# Patient Record
Sex: MALE | Race: OTHER | Employment: OTHER | URBAN - METROPOLITAN AREA
[De-identification: names, ages, dates, MRNs, and addresses within clinical notes are randomized per-mention and may not be internally consistent; named-entity substitution may affect disease eponyms.]

---

## 2017-01-12 RX ORDER — PRAVASTATIN SODIUM 20 MG/1
TABLET ORAL
Qty: 30 TAB | Refills: 2 | Status: SHIPPED | OUTPATIENT
Start: 2017-01-12 | End: 2017-10-02 | Stop reason: SDUPTHER

## 2017-01-17 ENCOUNTER — OFFICE VISIT (OUTPATIENT)
Dept: INTERNAL MEDICINE CLINIC | Age: 82
End: 2017-01-17

## 2017-01-17 VITALS
BODY MASS INDEX: 26.1 KG/M2 | DIASTOLIC BLOOD PRESSURE: 78 MMHG | RESPIRATION RATE: 16 BRPM | HEIGHT: 69 IN | OXYGEN SATURATION: 98 % | HEART RATE: 69 BPM | WEIGHT: 176.2 LBS | TEMPERATURE: 98.4 F | SYSTOLIC BLOOD PRESSURE: 126 MMHG

## 2017-01-17 DIAGNOSIS — R73.03 PREDIABETES: Primary | ICD-10-CM

## 2017-01-17 DIAGNOSIS — E78.2 MIXED HYPERLIPIDEMIA: ICD-10-CM

## 2017-01-17 DIAGNOSIS — R42 DIZZINESS AND GIDDINESS: ICD-10-CM

## 2017-01-17 RX ORDER — METFORMIN HYDROCHLORIDE 500 MG/1
500 TABLET ORAL
Qty: 30 TAB | Refills: 0 | Status: SHIPPED | OUTPATIENT
Start: 2017-01-17 | End: 2017-02-06 | Stop reason: SDUPTHER

## 2017-01-17 NOTE — PROGRESS NOTES
Chief Complaint   Patient presents with    High Blood Sugar     checked yesterday it was 174 at breakfast 145 states was advised to take tablets. he quit taking and now feels it is time to go back to tablets. states that eyes are burning and itching. states has some dizzines that goes away without eating.

## 2017-01-17 NOTE — PROGRESS NOTES
HISTORY OF PRESENT ILLNESS  Manuel Joshi is a 80 y.o. male. HPI  The patient comes in today C/O high blood sugar. His fasting BS was 145 this morning and he is not taking medication for this. He was not feeling very well the past week and his BS was high. He has been feeling very tired and weak the past 3 days. He has been dizzy as well. He has had increased thirst.  His A1c in 01/2016 was 6.6% , he wanted to control it on diet and exercise but he did admit today that he was not compliant with diet all the time. His BP is also high today at 150/88 and it was rechecked at 126/78. He checks his BP sometime at home and it is normal. He is taking Pravachol for high cholesterol and Neurontin for his leg pain. He also takes a baby aspirin a day. He has followed with ophthalmologist at [facility]. He had a Pterigyum and the surgery was done by Dr. Jose F Mo. Patient Active Problem List   Diagnosis Code    Coronary atherosclerosis of native coronary artery I25.10    Hyperlipidemia E78.5    Snoring R06.83    Fatigue R53.83    Headache(784.0)         Current Outpatient Prescriptions on File Prior to Visit   Medication Sig Dispense Refill    pravastatin (PRAVACHOL) 20 mg tablet TAKE 1 TABLET BY MOUTH AT BEDTIME 30 Tab 2    LOTEMAX 0.5 % oint   0    gabapentin (NEURONTIN) 300 mg capsule       VITAMIN D3 2,000 unit cap       aspirin delayed-release (ASPIR-LOW) 81 mg tablet Take 81 mg by mouth daily. No current facility-administered medications on file prior to visit. No Known Allergies    Past Medical History   Diagnosis Date    BPH (benign prostatic hyperplasia)     Hyperlipemia     Hypertension     RA (rheumatoid arthritis) ([facility] [address]        History reviewed. No pertinent past surgical history.     Family History   Problem Relation Age of Onset    Family history unknown: Yes       Social History     Social History    Marital status: UNKNOWN     Spouse name: N/A    Number of children: N/A    Years of education: N/A     Occupational History    Not on file. Social History Main Topics    Smoking status: Former Smoker     Packs/day: 1.00     Years: 15.00     Types: Cigarettes     Quit date: 3/19/1975    Smokeless tobacco: Never Used    Alcohol use No    Drug use: No    Sexual activity: Yes     Partners: Female     Other Topics Concern    Not on file     Social History Narrative           Review of Systems   Constitutional: Positive for malaise/fatigue. HENT: Negative for congestion. Respiratory: Negative for cough and wheezing. Cardiovascular: Negative for chest pain and palpitations. Neurological: Positive for dizziness and weakness. Negative for tingling, sensory change and headaches. Visit Vitals    /78 (BP 1 Location: Right arm, BP Patient Position: Sitting)    Pulse 69    Temp 98.4 °F (36.9 °C) (Oral)    Resp 16    Ht 5' 9\" (1.753 m)    Wt 176 lb 3.2 oz (79.9 kg)    SpO2 98%    BMI 26.02 kg/m2     Physical Exam   Constitutional: He is oriented to person, place, and time. He appears well-nourished. No distress. HENT:   Right Ear: External ear normal.   Left Ear: External ear normal.   Mouth/Throat: Oropharynx is clear and moist.   Eyes: Conjunctivae and EOM are normal.   Neck: Normal range of motion. Neck supple. Cardiovascular: Normal rate and regular rhythm. Pulmonary/Chest: Effort normal and breath sounds normal. He has no wheezes. Abdominal: Soft. Bowel sounds are normal.   Neurological: He is alert and oriented to person, place, and time. Psychiatric: He has a normal mood and affect. Nursing note and vitals reviewed. ASSESSMENT and PLAN    ICD-10-CM ICD-9-CM    1. Prediabetes R73.03 790.29 HEMOGLOBIN A1C WITH EAG      METABOLIC PANEL, COMPREHENSIVE      metFORMIN (GLUCOPHAGE) 500 mg tablet sent to pharmacy. I will send his metformin to the pharmacy to help keep his BS under control.  I want him to take this medication once a day until I get the blood work results back. 2. Mixed hyperlipidemia E78.2 272.2 LIPID PANEL    Pt will return during lab hours to have basic fasting labs drawn. 3. Dizziness and giddiness R42 780.4 We will check his levels to determine if his diabetes has not gotten worse. This plan was reviewed with the patient and patient agrees. All questions were answered. This scribe documentation was reviewed by me and accurately reflects the examination and decisions made by me. This note will not be viewable in [address]

## 2017-01-17 NOTE — MR AVS SNAPSHOT
Visit Information Date & Time Provider Department Dept. Phone Encounter #  
 1/17/2017 12:00 PM Melisa Nagel, [address] [facility],[address] Internal Medicine [phone] [id number] Upcoming Health Maintenance Date Due DTaP/Tdap/Td series (1 - Tdap) 6/24/1955 ZOSTER VACCINE AGE 60> 6/24/1994 GLAUCOMA SCREENING Q2Y 6/24/1999 Pneumococcal 65+ Low/Medium Risk (1 of 2 - PCV13) 6/24/1999 MEDICARE YEARLY EXAM 6/24/1999 INFLUENZA AGE 9 TO ADULT 8/1/2016 COLONOSCOPY 11/4/2019 Allergies as of 1/17/2017  Review Complete On: 1/17/2017 By: Melisa Nagel MD  
 No Known Allergies Current Immunizations  Never Reviewed No immunizations on file. Not reviewed this visit You Were Diagnosed With   
  
 Codes Comments Prediabetes    -  Primary ICD-10-CM: R73.03 
ICD-9-CM: 790.29 Mixed hyperlipidemia     ICD-10-CM: E78.2 ICD-9-CM: 272.2 Dizziness and giddiness     ICD-10-CM: E29 ICD-9-CM: 780.4 Vitals BP Pulse Temp Resp Height(growth percentile) Weight(growth percentile) 126/78 (BP 1 Location: Right arm, BP Patient Position: Sitting) 69 98.4 °F (36.9 °C) (Oral) 16 5' 9\" (1.753 m) 176 lb 3.2 oz (79.9 kg) SpO2 BMI Smoking Status 98% 26.02 kg/m2 Former Smoker Vitals History BMI and BSA Data Body Mass Index Body Surface Area 26.02 kg/m 2 1.97 m 2 Preferred Pharmacy Pharmacy Name Phone [id number] [place] [phone] Your Updated Medication List  
  
   
This list is accurate as of: 1/17/17 12:34 PM.  Always use your most recent med list.  
  
  
  
  
 ASPIR-LOW 81 mg tablet Generic drug:  aspirin delayed-release Take 81 mg by mouth daily. gabapentin 300 mg capsule Commonly known as:  NEURONTIN  
  
 LOTEMAX 0.5 % Oint Generic drug:  loteprednol etabonate  
  
 metFORMIN 500 mg tablet Commonly known as:  GLUCOPHAGE  
 Take 1 Tab by mouth daily (with breakfast) for 30 days. pravastatin 20 mg tablet Commonly known as:  PRAVACHOL  
TAKE 1 TABLET BY MOUTH AT BEDTIME  
  
 VITAMIN D3 2,000 unit Cap capsule Generic drug:  Cholecalciferol (Vitamin D3) Prescriptions Sent to Pharmacy Refills  
 metFORMIN (GLUCOPHAGE) 500 mg tablet 0 Sig: Take 1 Tab by mouth daily (with breakfast) for 30 days. Class: Normal  
 Pharmacy: [facility] #: [phone] Route: Oral  
  
To-Do List   
 01/18/2017 Lab:  HEMOGLOBIN A1C WITH EAG   
  
 01/18/2017 Lab:  LIPID PANEL   
  
 01/18/2017 Lab:  METABOLIC PANEL, COMPREHENSIVE Introducing [facility] & HEALTH SERVICES! Fidel Magana introduces [employer] patient portal. Now you can access parts of your medical record, email your doctor's office, and request medication refills online. 1. In your internet browser, go to [email or web address] 2. Click on the First Time User? Click Here link in the Sign In box. You will see the New Member Sign Up page. 3. Enter your [employer] Access Code exactly as it appears below. You will not need to use this code after youve completed the sign-up process. If you do not sign up before the expiration date, you must request a new code. · [employer] Access Code: [email or web address] Expires: 4/17/2017 12:34 PM 
 
4. Enter the last four digits of your Social Security Number (xxxx) and Date of Birth (mm/dd/yyyy) as indicated and click Submit. You will be taken to the next sign-up page. 5. Create a Modernizing [employer] ID. This will be your [employer] login ID and cannot be changed, so think of one that is secure and easy to remember. 6. Create a [employer] password. You can change your password at any time. 7. Enter your Password Reset Question and Answer. This can be used at a later time if you forget your password. 8. Enter your e-mail address. You will receive e-mail notification when new information is available in [address] 9. Click Sign Up. You can now view and download portions of your medical record. 10. Click the Download Summary menu link to download a portable copy of your medical information. If you have questions, please visit the Frequently Asked Questions section of the [employer] website. Remember, [employer] is NOT to be used for urgent needs. For medical emergencies, dial 911. Now available from your iPhone and Android! Please provide this summary of care documentation to your next provider. Your primary care clinician is listed as Nora Khoury. If you have any questions after today's visit, please call [phone].

## 2017-01-18 DIAGNOSIS — E78.2 MIXED HYPERLIPIDEMIA: ICD-10-CM

## 2017-01-18 DIAGNOSIS — R73.03 PREDIABETES: ICD-10-CM

## 2017-01-18 NOTE — LETTER
1/30/2017 9:36 AM 
 
Mr. Citlali Morales 
[address] [address] Dear Citlali Morales: 
 
Please find your most recent results below. Resulted Orders HEMOGLOBIN A1C WITH EAG Result Value Ref Range Hemoglobin A1c 7.1 (H) 4.8 - 5.6 % Comment:  
            Pre-diabetes: 5.7 - 6.4 Diabetes: >6.4 Glycemic control for adults with diabetes: <7.0 Estimated average glucose 157 mg/dL Narrative Performed at:  [address]  [id number] : Geoffrey Martini MD, Phone:  [phone] LIPID PANEL Result Value Ref Range Cholesterol, total 167 100 - 199 mg/dL Triglyceride 191 (H) 0 - 149 mg/dL HDL Cholesterol 40 >39 mg/dL VLDL, calculated 38 5 - 40 mg/dL LDL, calculated 89 0 - 99 mg/dL Narrative Performed at:  [address]  [id number] : Geoffrey Martini MD, Phone:  [phone] METABOLIC PANEL, COMPREHENSIVE Result Value Ref Range Glucose 166 (H) 65 - 99 mg/dL BUN 20 8 - 27 mg/dL Creatinine 1.18 0.76 - 1.27 mg/dL GFR est non-AA 57 (L) >59 mL/min/1.73 GFR est AA 66 >59 mL/min/1.73  
 BUN/Creatinine ratio 17 10 - 22 Sodium 138 134 - 144 mmol/L Potassium 4.3 3.5 - 5.2 mmol/L Chloride 100 96 - 106 mmol/L  
 CO2 21 18 - 29 mmol/L Calcium 9.3 8.6 - 10.2 mg/dL Protein, total 7.6 6.0 - 8.5 g/dL Albumin 4.6 3.5 - 4.7 g/dL GLOBULIN, TOTAL 3.0 1.5 - 4.5 g/dL A-G Ratio 1.5 1.1 - 2.5 Bilirubin, total 0.6 0.0 - 1.2 mg/dL Alk. phosphatase 84 39 - 117 IU/L  
 AST 29 0 - 40 IU/L  
 ALT 33 0 - 44 IU/L Narrative Performed at:  [address]  [id number] : Geoffrey Martini MD, Phone:  [phone] CVD REPORT Result Value Ref Range INTERPRETATION NTAP Narrative Performed at:  [address] [place] [address]  [id number] : Lucero Roman PhD, Phone:  [phone] CKD REPORT Result Value Ref Range Interpretation Note Comment:  
   Supplement report is available. Narrative Performed at:  [address] [place] [address]  [id number] : Lucero Roman PhD, Phone:  [phone] RECOMMENDATIONS: 
Your HbA1C(average of last 3 mths blood sugar) has gone up. Should continue Metformin & diabetic diet. Repeat Labs in 3 months. Please call me if you have any questions: [phone] Sincerely, Juan C Mancera MD

## 2017-01-19 LAB
ALBUMIN SERPL-MCNC: 4.6 G/DL (ref 3.5–4.7)
ALBUMIN/GLOB SERPL: 1.5 {RATIO} (ref 1.1–2.5)
ALP SERPL-CCNC: 84 IU/L (ref 39–117)
ALT SERPL-CCNC: 33 IU/L (ref 0–44)
AST SERPL-CCNC: 29 IU/L (ref 0–40)
BILIRUB SERPL-MCNC: 0.6 MG/DL (ref 0–1.2)
BUN SERPL-MCNC: 20 MG/DL (ref 8–27)
BUN/CREAT SERPL: 17 (ref 10–22)
CALCIUM SERPL-MCNC: 9.3 MG/DL (ref 8.6–10.2)
CHLORIDE SERPL-SCNC: 100 MMOL/L (ref 96–106)
CHOLEST SERPL-MCNC: 167 MG/DL (ref 100–199)
CO2 SERPL-SCNC: 21 MMOL/L (ref 18–29)
CREAT SERPL-MCNC: 1.18 MG/DL (ref 0.76–1.27)
EST. AVERAGE GLUCOSE BLD GHB EST-MCNC: 157 MG/DL
GLOBULIN SER CALC-MCNC: 3 G/DL (ref 1.5–4.5)
GLUCOSE SERPL-MCNC: 166 MG/DL (ref 65–99)
HBA1C MFR BLD: 7.1 % (ref 4.8–5.6)
HDLC SERPL-MCNC: 40 MG/DL
INTERPRETATION, 910389: NORMAL
INTERPRETATION: NORMAL
LDLC SERPL CALC-MCNC: 89 MG/DL (ref 0–99)
POTASSIUM SERPL-SCNC: 4.3 MMOL/L (ref 3.5–5.2)
PROT SERPL-MCNC: 7.6 G/DL (ref 6–8.5)
SODIUM SERPL-SCNC: 138 MMOL/L (ref 134–144)
TRIGL SERPL-MCNC: 191 MG/DL (ref 0–149)
VLDLC SERPL CALC-MCNC: 38 MG/DL (ref 5–40)

## 2017-02-06 DIAGNOSIS — R73.03 PREDIABETES: ICD-10-CM

## 2017-02-06 RX ORDER — METFORMIN HYDROCHLORIDE 500 MG/1
500 TABLET ORAL
Qty: 30 TAB | Refills: 0 | Status: SHIPPED | OUTPATIENT
Start: 2017-02-06 | End: 2017-02-26 | Stop reason: SDUPTHER

## 2017-02-26 DIAGNOSIS — R73.03 PREDIABETES: ICD-10-CM

## 2017-02-27 RX ORDER — METFORMIN HYDROCHLORIDE 500 MG/1
TABLET ORAL
Qty: 30 TAB | Refills: 0 | Status: SHIPPED | OUTPATIENT
Start: 2017-02-27 | End: 2017-04-29 | Stop reason: SDUPTHER

## 2017-04-29 DIAGNOSIS — R73.03 PREDIABETES: ICD-10-CM

## 2017-05-01 RX ORDER — METFORMIN HYDROCHLORIDE 500 MG/1
TABLET ORAL
Qty: 30 TAB | Refills: 0 | Status: SHIPPED | OUTPATIENT
Start: 2017-05-01 | End: 2017-07-02 | Stop reason: SDUPTHER

## 2017-05-22 ENCOUNTER — OFFICE VISIT (OUTPATIENT)
Dept: INTERNAL MEDICINE CLINIC | Age: 82
End: 2017-05-22

## 2017-05-22 VITALS
RESPIRATION RATE: 16 BRPM | SYSTOLIC BLOOD PRESSURE: 138 MMHG | HEART RATE: 62 BPM | DIASTOLIC BLOOD PRESSURE: 82 MMHG | BODY MASS INDEX: 25.3 KG/M2 | TEMPERATURE: 98.4 F | WEIGHT: 170.8 LBS | HEIGHT: 69 IN | OXYGEN SATURATION: 98 %

## 2017-05-22 DIAGNOSIS — M54.9 ACUTE UPPER BACK PAIN: ICD-10-CM

## 2017-05-22 DIAGNOSIS — R05.3 COUGH, PERSISTENT: Primary | ICD-10-CM

## 2017-05-22 RX ORDER — DIFLUNISAL 500 MG/1
500 TABLET, FILM COATED ORAL 2 TIMES DAILY
Qty: 30 TAB | Refills: 0 | Status: SHIPPED | OUTPATIENT
Start: 2017-05-22 | End: 2017-07-05 | Stop reason: SDUPTHER

## 2017-05-22 RX ORDER — BENZONATATE 200 MG/1
200 CAPSULE ORAL
Qty: 21 CAP | Refills: 0 | Status: SHIPPED | OUTPATIENT
Start: 2017-05-22 | End: 2018-03-15 | Stop reason: SDUPTHER

## 2017-05-22 NOTE — PROGRESS NOTES
Chief Complaint   Patient presents with    Cough     states that he has had a cough for a month and now when he coughs his back has pain. mid back in center.

## 2017-05-22 NOTE — MR AVS SNAPSHOT
Visit Information Date & Time Provider Department Dept. Phone Encounter #  
 5/22/2017 12:45 PM Claudine Perkins MD [facility] Internal Medicine [phone] [id number] Upcoming Health Maintenance Date Due DTaP/Tdap/Td series (1 - Tdap) 6/24/1955 ZOSTER VACCINE AGE 60> 6/24/1994 GLAUCOMA SCREENING Q2Y 6/24/1999 Pneumococcal 65+ Low/Medium Risk (1 of 2 - PCV13) 6/24/1999 MEDICARE YEARLY EXAM 6/24/1999 INFLUENZA AGE 9 TO ADULT 8/1/2017 COLONOSCOPY 11/4/2019 Allergies as of 5/22/2017  Review Complete On: 5/22/2017 By: Claudine Perkins MD  
 No Known Allergies Current Immunizations  Never Reviewed No immunizations on file. Not reviewed this visit You Were Diagnosed With   
  
 Codes Comments Cough, persistent    -  Primary ICD-10-CM: R05 ICD-9-CM: 786.2 Acute upper back pain     ICD-10-CM: M54.9 ICD-9-CM: 724.5, 338.19 Vitals BP Pulse Temp Resp Height(growth percentile) Weight(growth percentile) 138/82 (BP 1 Location: Right arm, BP Patient Position: Sitting) 62 98.4 °F (36.9 °C) (Oral) 16 5' 9\" (1.753 m) 170 lb 12.8 oz (77.5 kg) SpO2 BMI Smoking Status 98% 25.22 kg/m2 Former Smoker BMI and BSA Data Body Mass Index Body Surface Area  
 25.22 kg/m 2 1.94 m 2 Preferred Pharmacy Pharmacy Name Phone [id number], [address] [place] [address] [phone] Your Updated Medication List  
  
   
This list is accurate as of: 5/22/17  1:11 PM.  Always use your most recent med list.  
  
  
  
  
 ASPIR-LOW 81 mg tablet Generic drug:  aspirin delayed-release Take 81 mg by mouth daily. benzonatate 200 mg capsule Commonly known as:  TESSALON Take 1 Cap by mouth three (3) times daily as needed for Cough for up to 7 days. diflunisal 500 mg Tab Commonly known as:  DOLOBID Take 1 Tab by mouth two (2) times a day for 15 days. gabapentin 300 mg capsule Commonly known as:  NEURONTIN  
  
 LOTEMAX 0.5 % Oint Generic drug:  loteprednol etabonate  
  
 metFORMIN 500 mg tablet Commonly known as:  GLUCOPHAGE  
TAKE 1 TABLET BY MOUTH DAILY WITH BREAKFAST  
  
 pravastatin 20 mg tablet Commonly known as:  PRAVACHOL  
TAKE 1 TABLET BY MOUTH AT BEDTIME  
  
 VITAMIN D3 2,000 unit Cap capsule Generic drug:  Cholecalciferol (Vitamin D3) Prescriptions Sent to Pharmacy Refills  
 benzonatate (TESSALON) 200 mg capsule 0 Sig: Take 1 Cap by mouth three (3) times daily as needed for Cough for up to 7 days. Class: Normal  
 Pharmacy: [facility] #: [phone] Route: Oral  
 diflunisal (DOLOBID) 500 mg tab 0 Sig: Take 1 Tab by mouth two (2) times a day for 15 days. Class: Normal  
 Pharmacy: [facility] #: [phone] Route: Oral  
  
Introducing [facility] & HEALTH SERVICES! Dajuan Carey introduces [employer] patient portal. Now you can access parts of your medical record, email your doctor's office, and request medication refills online. 1. In your internet browser, go to [email or web address] 2. Click on the First Time User? Click Here link in the Sign In box. You will see the New Member Sign Up page. 3. Enter your [employer] Access Code exactly as it appears below. You will not need to use this code after youve completed the sign-up process. If you do not sign up before the expiration date, you must request a new code. · [employer] Access Code: [email or web address] Expires: 8/20/2017  1:11 PM 
 
4. Enter the last four digits of your Social Security Number (xxxx) and Date of Birth (mm/dd/yyyy) as indicated and click Submit. You will be taken to the next sign-up page. 5. Create a [employer] ID. This will be your [employer] login ID and cannot be changed, so think of one that is secure and easy to remember. 6. Create a [employer] password. You can change your password at any time. 7. Enter your Password Reset Question and Answer. This can be used at a later time if you forget your password. 8. Enter your e-mail address. You will receive e-mail notification when new information is available in [address] 9. Click Sign Up. You can now view and download portions of your medical record. 10. Click the Download Summary menu link to download a portable copy of your medical information. If you have questions, please visit the Frequently Asked Questions section of the [employer] website. Remember, [employer] is NOT to be used for urgent needs. For medical emergencies, dial 911. Now available from your iPhone and Android! Please provide this summary of care documentation to your next provider. Your primary care clinician is listed as Tara Oliver. If you have any questions after today's visit, please call [phone].

## 2017-05-22 NOTE — PROGRESS NOTES
Written by Vignesh Kerns, as dictated by Dr. Tavon Lezama MD.    Shanae Crane is a 80 y.o. male. HPI  The patient comes in today C/O cough x 1 month and when he coughs he has pain in his back. The pain is in the middle of his upper back. He denies any fever or chills. The pain only started with his coughing. He denies any night sweats. The cough is dry, and he does not bring up any phlegm. He has taken tylenol for his pain, but it did not help. Patient Active Problem List   Diagnosis Code    Coronary atherosclerosis of native coronary artery I25.10    Hyperlipidemia E78.5    Snoring R06.83    Fatigue R53.83    Headache R51        Current Outpatient Prescriptions on File Prior to Visit   Medication Sig Dispense Refill    metFORMIN (GLUCOPHAGE) 500 mg tablet TAKE 1 TABLET BY MOUTH DAILY WITH BREAKFAST 30 Tab 0    pravastatin (PRAVACHOL) 20 mg tablet TAKE 1 TABLET BY MOUTH AT BEDTIME 30 Tab 2    gabapentin (NEURONTIN) 300 mg capsule       aspirin delayed-release (ASPIR-LOW) 81 mg tablet Take 81 mg by mouth daily.  LOTEMAX 0.5 % oint   0    VITAMIN D3 2,000 unit cap        No current facility-administered medications on file prior to visit. Past Medical History:   Diagnosis Date    BPH (benign prostatic hyperplasia)     Hyperlipemia     Hypertension     RA (rheumatoid arthritis) ([facility] [address]        Family History   Problem Relation Age of Onset    Family history unknown: Yes       Social History     Social History    Marital status: UNKNOWN     Spouse name: N/A    Number of children: N/A    Years of education: N/A     Occupational History    Not on file.      Social History Main Topics    Smoking status: Former Smoker     Packs/day: 1.00     Years: 15.00     Types: Cigarettes     Quit date: 3/19/1975    Smokeless tobacco: Never Used    Alcohol use No    Drug use: No    Sexual activity: Yes     Partners: Female     Other Topics Concern    Not on file     Social History Narrative         Review of Systems   Constitutional: Negative for malaise/fatigue. HENT: Negative for congestion. Respiratory: Positive for cough. Negative for wheezing. Cardiovascular: Negative for chest pain and palpitations. Musculoskeletal: Positive for back pain. Negative for joint pain and myalgias. Neurological: Negative for weakness and headaches. Visit Vitals    /82 (BP 1 Location: Right arm, BP Patient Position: Sitting)    Pulse 62    Temp 98.4 °F (36.9 °C) (Oral)    Resp 16    Ht 5' 9\" (1.753 m)    Wt 170 lb 12.8 oz (77.5 kg)    SpO2 98%    BMI 25.22 kg/m2     Physical Exam   Constitutional: He is oriented to person, place, and time. He appears well-nourished. No distress. HENT:   Right Ear: External ear normal.   Left Ear: External ear normal.   Mouth/Throat: Oropharynx is clear and moist.   Eyes: Conjunctivae and EOM are normal. Right eye exhibits no discharge. Left eye exhibits no discharge. Neck: Normal range of motion. Neck supple. Cardiovascular: Normal rate and regular rhythm. Pulmonary/Chest: Effort normal and breath sounds normal. He has no wheezes. Abdominal: Soft. Bowel sounds are normal. He exhibits no distension. Lymphadenopathy:     He has no cervical adenopathy. Neurological: He is alert and oriented to person, place, and time. Psychiatric: He has a normal mood and affect. Nursing note and vitals reviewed. ASSESSMENT and PLAN    ICD-10-CM ICD-9-CM    1. Cough, persistent R05 786.2 benzonatate (TESSALON) 200 mg capsule sent to pharmacy    I will start him Tessalon to help stop the coughing. He can take Tessalon TID. If the cough does not go away then I will send him for an XR.     2. Acute upper back pain M54.9 724.5 diflunisal (DOLOBID) 500 mg tab sent to pharmacy     338.19   I will give him  Dolobid BID for 5 days to help with the pain, but I want him to take the cough medicine TID first. If the pain goes away with the cough medication then he does not need to take it. If no improvement with Dolobid then needs an imaging. This plan was reviewed with the patient and patient agrees. All questions were answered. This scribe documentation was reviewed by me and accurately reflects the examination and decisions made by me. This note will not be viewable in [address]

## 2017-07-02 DIAGNOSIS — R73.03 PREDIABETES: ICD-10-CM

## 2017-07-03 RX ORDER — PRAVASTATIN SODIUM 20 MG/1
TABLET ORAL
Qty: 30 TAB | Refills: 2 | Status: SHIPPED | OUTPATIENT
Start: 2017-07-03 | End: 2020-04-24 | Stop reason: SDUPTHER

## 2017-07-03 RX ORDER — METFORMIN HYDROCHLORIDE 500 MG/1
TABLET ORAL
Qty: 30 TAB | Refills: 0 | Status: SHIPPED | OUTPATIENT
Start: 2017-07-03 | End: 2017-07-25 | Stop reason: SDUPTHER

## 2017-07-05 DIAGNOSIS — M54.9 ACUTE UPPER BACK PAIN: ICD-10-CM

## 2017-07-05 RX ORDER — DIFLUNISAL 500 MG/1
500 TABLET, FILM COATED ORAL 2 TIMES DAILY
Qty: 30 TAB | Refills: 0 | Status: SHIPPED | OUTPATIENT
Start: 2017-07-05 | End: 2017-07-20

## 2017-07-06 NOTE — TELEPHONE ENCOUNTER
Received refill request from [employer] for disflunisal.  Sent yesterday to [email or web address] with confirmed receipt

## 2017-08-06 DIAGNOSIS — R73.03 PREDIABETES: ICD-10-CM

## 2017-08-06 RX ORDER — METFORMIN HYDROCHLORIDE 500 MG/1
TABLET ORAL
Qty: 90 TAB | Refills: 0 | Status: SHIPPED | OUTPATIENT
Start: 2017-08-06 | End: 2021-10-05 | Stop reason: SDUPTHER

## 2017-09-14 ENCOUNTER — CLINICAL SUPPORT (OUTPATIENT)
Dept: INTERNAL MEDICINE CLINIC | Age: 82
End: 2017-09-14

## 2017-09-14 VITALS — SYSTOLIC BLOOD PRESSURE: 128 MMHG | DIASTOLIC BLOOD PRESSURE: 70 MMHG

## 2017-09-14 DIAGNOSIS — J30.2 SEASONAL ALLERGIC RHINITIS, UNSPECIFIED ALLERGIC RHINITIS TRIGGER: Primary | ICD-10-CM

## 2017-09-14 NOTE — PROGRESS NOTES
Chief Complaint   Patient presents with    Blood Pressure Check     went to pharmacy with headache and bp was elevated

## 2017-09-19 ENCOUNTER — DOCUMENTATION ONLY (OUTPATIENT)
Dept: INTERNAL MEDICINE CLINIC | Age: 82
End: 2017-09-19

## 2017-09-19 NOTE — PROGRESS NOTES
Completed for [facility] and placed for signature on Dr Lackey Europe folder.

## 2017-10-02 RX ORDER — PRAVASTATIN SODIUM 20 MG/1
TABLET ORAL
Qty: 90 TAB | Refills: 0 | Status: SHIPPED | OUTPATIENT
Start: 2017-10-02 | End: 2017-12-21 | Stop reason: SDUPTHER

## 2017-10-09 ENCOUNTER — DOCUMENTATION ONLY (OUTPATIENT)
Dept: INTERNAL MEDICINE CLINIC | Age: 82
End: 2017-10-09

## 2017-10-09 NOTE — PROGRESS NOTES
Fexofenadine re submitted under [facility]. First prior auth was submitted by pharmacy and submitted to [address] [address].   New key is [id number]

## 2017-10-16 ENCOUNTER — DOCUMENTATION ONLY (OUTPATIENT)
Dept: INTERNAL MEDICINE CLINIC | Age: 82
End: 2017-10-16

## 2017-12-11 ENCOUNTER — OFFICE VISIT (OUTPATIENT)
Dept: INTERNAL MEDICINE CLINIC | Age: 82
End: 2017-12-11

## 2017-12-11 VITALS
OXYGEN SATURATION: 98 % | RESPIRATION RATE: 16 BRPM | HEIGHT: 69 IN | DIASTOLIC BLOOD PRESSURE: 66 MMHG | TEMPERATURE: 98.4 F | HEART RATE: 61 BPM | SYSTOLIC BLOOD PRESSURE: 120 MMHG | BODY MASS INDEX: 25.03 KG/M2 | WEIGHT: 169 LBS

## 2017-12-11 DIAGNOSIS — R10.31 RIGHT LOWER QUADRANT ABDOMINAL PAIN: Primary | ICD-10-CM

## 2017-12-11 DIAGNOSIS — R91.1 PULMONARY NODULE: ICD-10-CM

## 2017-12-11 DIAGNOSIS — R20.2 PARESTHESIA OF BOTH FEET: ICD-10-CM

## 2017-12-11 NOTE — PROGRESS NOTES
Written by Price Grant, as dictated by Dr. Jolanta Torres MD.  Valentino Cohens HPI Baby Canada is a 80 y.o. Male who comes in for right abdominal pain that has gotten worse. He states that the pain started 2 months ago before he went to [facility] and got progressively worse. He states that now he has constant abdominal pain. He states that when he was in [facility] he had some constipation and took a stool softener which helped. Denies constipation since coming back. He states that he has been urinating more often at night around 3 times a night. He reports some tingling and numbness in his feet and toes. He thinks it is from his diabetes or intermittent back pain. He had a CT of his chest done in 12/26/2015 that showed a small nodule in his right lobe. Pt is not fasting. Patient Active Problem List   Diagnosis Code    Coronary atherosclerosis of native coronary artery I25.10    Hyperlipidemia E78.5    Snoring R06.83    Fatigue R53.83    Headache(784.0) R51        Current Outpatient Prescriptions on File Prior to Visit   Medication Sig Dispense Refill    metFORMIN (GLUCOPHAGE) 500 mg tablet TAKE 1 TABLET BY MOUTH DAILY WITH BREAKFAST 90 Tab 0    pravastatin (PRAVACHOL) 20 mg tablet TAKE 1 TABLET BY MOUTH AT BEDTIME 30 Tab 2    VITAMIN D3 2,000 unit cap       aspirin delayed-release (ASPIR-LOW) 81 mg tablet Take 81 mg by mouth daily.  pravastatin (PRAVACHOL) 20 mg tablet TAKE 1 TABLET BY MOUTH AT BEDTIME 90 Tab 0    LOTEMAX 0.5 % oint   0     No current facility-administered medications on file prior to visit. No Known Allergies    Past Medical History:   Diagnosis Date    BPH (benign prostatic hyperplasia)     Hyperlipemia     Hypertension     RA (rheumatoid arthritis) ([facility] [address] [address]        History reviewed. No pertinent surgical history.     Family History   Problem Relation Age of Onset    Family history unknown: Yes       Social History     Social History    Marital status: UNKNOWN     Spouse name: N/A    Number of children: N/A    Years of education: N/A     Occupational History    Not on file. Social History Main Topics    Smoking status: Former Smoker     Packs/day: 1.00     Years: 15.00     Types: Cigarettes     Quit date: 3/19/1975    Smokeless tobacco: Never Used    Alcohol use No    Drug use: No    Sexual activity: Yes     Partners: Female     Other Topics Concern    Not on file     Social History Narrative       Review of Systems   Constitutional: Negative for malaise/fatigue. HENT: Negative for congestion. Eyes: Negative for blurred vision and pain. Respiratory: Negative for cough and shortness of breath. Cardiovascular: Negative for chest pain and palpitations. Gastrointestinal: Positive for abdominal pain. Negative for constipation and heartburn. Genitourinary: Positive for frequency. Negative for urgency. Musculoskeletal: Negative for joint pain and myalgias. Neurological: Positive for tingling (toes and feet). Negative for dizziness, sensory change, weakness and headaches. Psychiatric/Behavioral: Negative for depression, memory loss and substance abuse. Visit Vitals    /66 (BP 1 Location: Left arm, BP Patient Position: Sitting)    Pulse 61    Temp 98.4 °F (36.9 °C) (Oral)    Resp 16    Ht 5' 9\" (1.753 m)    Wt 169 lb (76.7 kg)    SpO2 98%    BMI 24.96 kg/m2       Physical Exam   Constitutional: He is oriented to person, place, and time. He appears well-developed and well-nourished. HENT:   Right Ear: External ear normal.   Left Ear: External ear normal.   Eyes: Conjunctivae and EOM are normal.   Neck: Normal range of motion. Neck supple. Cardiovascular: Normal rate and regular rhythm. Pulmonary/Chest: Effort normal and breath sounds normal. He has no wheezes. Abdominal: Soft. Bowel sounds are normal. There is no tenderness. Neurological: He is alert and oriented to person, place, and time. Psychiatric: He has a normal mood and affect. Nursing note and vitals reviewed. ASSESSMENT and PLAN    ICD-10-CM ICD-9-CM    1. Right lower quadrant abdominal pain R10.31 789.03 CT ABD PELV W CONT      METABOLIC PANEL, COMPREHENSIVE      CBC W/O DIFF   Will get a CT of his abdomen. Will test kidneys before CT due to contrast dye today. 2. Pulmonary nodule R91.1 793.11 CT CHEST W WO CONT  Will get another CT of his chest to examine nodule found 2 years ago. 3. Paresthesia of both feet R20.2 782.0 VITAMIN B12  Will check his B12 to rule out anything abnormal causing his numbness and tingling. This plan was reviewed with the patient and patient agrees. All questions were answered. This scribe documentation was reviewed by me and accurately reflects the examination and decisions made by me. This note will not be viewable in [address]

## 2017-12-11 NOTE — PROGRESS NOTES
Pt here to be seen for right side abdominal pain that has gotten progressively worse. He states that he now has constant abdominal pain. He also has an increase in urinary frequency he states that he is urinating 3 times throughout the night this also began the same time as the abdominal pain. He also has c/o fatigue that began a few months ago. He also states that he has occasional burning of the feet. Chief Complaint   Patient presents with    Abdominal Pain     right side    Fatigue     Visit Vitals    /66 (BP 1 Location: Left arm, BP Patient Position: Sitting)    Pulse 61    Temp 98.4 °F (36.9 °C) (Oral)    Resp 16    Ht 5' 9\" (1.753 m)    Wt 169 lb (76.7 kg)    SpO2 98%    BMI 24.96 kg/m2      1. Have you been to the ER, urgent care clinic since your last visit? Hospitalized since your last visit? No     2. Have you seen or consulted any other health care providers outside of the [address] [place] [address] since your last visit? Include any pap smears or colon screening.  No

## 2017-12-11 NOTE — MR AVS SNAPSHOT
[address] [facility] [address] [place] [address] [facility] 
[phone] Patient: Sydnie Field MRN: [id number] UEI:[id number] Visit Information Date & Time Provider Department Dept. Phone Encounter #  
 12/11/2017 11:45 AM Tj Ling MD [facility] Internal Medicine [phone] [id number] Upcoming Health Maintenance Date Due DTaP/Tdap/Td series (1 - Tdap) 6/24/1955 ZOSTER VACCINE AGE 60> 4/24/1994 Pneumococcal 65+ Low/Medium Risk (1 of 2 - PCV13) 6/24/1999 MEDICARE YEARLY EXAM 6/24/1999 Influenza Age 5 to Adult 8/1/2017 GLAUCOMA SCREENING Q2Y 8/11/2019 COLONOSCOPY 11/4/2019 Allergies as of 12/11/2017  Review Complete On: 12/11/2017 By: Tj Ling MD  
 No Known Allergies Current Immunizations  Never Reviewed No immunizations on file. Not reviewed this visit You Were Diagnosed With   
  
 Codes Comments Right lower quadrant abdominal pain    -  Primary ICD-10-CM: R10.31 ICD-9-CM: 789.03   
 Pulmonary nodule     ICD-10-CM: R91.1 ICD-9-CM: 793.11 Paresthesia of both feet     ICD-10-CM: R20.2 ICD-9-CM: 566. 0 Vitals BP Pulse Temp Resp Height(growth percentile) Weight(growth percentile) 120/66 (BP 1 Location: Left arm, BP Patient Position: Sitting) 61 98.4 °F (36.9 °C) (Oral) 16 5' 9\" (1.753 m) 169 lb (76.7 kg) SpO2 BMI Smoking Status 98% 24.96 kg/m2 Former Smoker BMI and BSA Data Body Mass Index Body Surface Area 24.96 kg/m 2 1.93 m 2 Preferred Pharmacy Pharmacy Name Phone [id number] [address] [facility], [address] [place] [address] [phone] Your Updated Medication List  
  
   
This list is accurate as of: 12/11/17 12:17 PM.  Always use your most recent med list.  
  
  
  
  
 ASPIR-LOW 81 mg tablet Generic drug:  aspirin delayed-release Take 81 mg by mouth daily. LOTEMAX 0.5 % Oint Generic drug:  loteprednol etabonate  
  
 metFORMIN 500 mg tablet Commonly known as:  GLUCOPHAGE  
TAKE 1 TABLET BY MOUTH DAILY WITH BREAKFAST * pravastatin 20 mg tablet Commonly known as:  PRAVACHOL  
TAKE 1 TABLET BY MOUTH AT BEDTIME  
  
 * pravastatin 20 mg tablet Commonly known as:  PRAVACHOL  
TAKE 1 TABLET BY MOUTH AT BEDTIME  
  
 VITAMIN D3 2,000 unit Cap capsule Generic drug:  Cholecalciferol (Vitamin D3) * Notice: This list has 2 medication(s) that are the same as other medications prescribed for you. Read the directions carefully, and ask your doctor or other care provider to review them with you. We Performed the Following CBC W/O DIFF [04963 CPT(R)] METABOLIC PANEL, COMPREHENSIVE [26918 CPT(R)] VITAMIN B12 [id number] CPT(R)] To-Do List   
 12/11/2017 Imaging:  CT ABD PELV W CONT   
  
 12/11/2017 Imaging:  CT CHEST W WO CONT Referral Information Referral ID Referred By Referred To  
  
 [id number] Yovani Ip Not Available Visits Status Start Date End Date 1 New Request 12/11/17 12/11/18 If your referral has a status of pending review or denied, additional information will be sent to support the outcome of this decision. Referral ID Referred By Referred To  
 [id number] Yovani Ip Not Available Visits Status Start Date End Date 1 New Request 12/11/17 12/11/18 If your referral has a status of pending review or denied, additional information will be sent to support the outcome of this decision. Introducing [facility]! Kassandra Huber introduces [employer] patient portal. Now you can access parts of your medical record, email your doctor's office, and request medication refills online. 1. In your internet browser, go to [email or web address] 2. Click on the First Time User? Click Here link in the Sign In box. You will see the New Member Sign Up page. 3. Enter your [employer] Access Code exactly as it appears below. You will not need to use this code after youve completed the sign-up process. If you do not sign up before the expiration date, you must request a new code. · [employer] Access Code: [email or web address] Expires: 3/11/2018 12:17 PM 
 
4. Enter the last four digits of your Social Security Number (xxxx) and Date of Birth (mm/dd/yyyy) as indicated and click Submit. You will be taken to the next sign-up page. 5. Create a Vigodat ID. This will be your [employer] login ID and cannot be changed, so think of one that is secure and easy to remember. 6. Create a [employer] password. You can change your password at any time. 7. Enter your Password Reset Question and Answer. This can be used at a later time if you forget your password. 8. Enter your e-mail address. You will receive e-mail notification when new information is available in [address] 9. Click Sign Up. You can now view and download portions of your medical record. 10. Click the Download Summary menu link to download a portable copy of your medical information. If you have questions, please visit the Frequently Asked Questions section of the [employer] website. Remember, [employer] is NOT to be used for urgent needs. For medical emergencies, dial 911. Now available from your iPhone and Android! Please provide this summary of care documentation to your next provider. Your primary care clinician is listed as Loren Foster. If you have any questions after today's visit, please call [phone].

## 2017-12-12 LAB
ALBUMIN SERPL-MCNC: 4.5 G/DL (ref 3.5–4.7)
ALBUMIN/GLOB SERPL: 1.7 {RATIO} (ref 1.2–2.2)
ALP SERPL-CCNC: 74 IU/L (ref 39–117)
ALT SERPL-CCNC: 30 IU/L (ref 0–44)
AST SERPL-CCNC: 25 IU/L (ref 0–40)
BILIRUB SERPL-MCNC: 0.3 MG/DL (ref 0–1.2)
BUN SERPL-MCNC: 19 MG/DL (ref 8–27)
BUN/CREAT SERPL: 19 (ref 10–24)
CALCIUM SERPL-MCNC: 9.5 MG/DL (ref 8.6–10.2)
CHLORIDE SERPL-SCNC: 103 MMOL/L (ref 96–106)
CO2 SERPL-SCNC: 19 MMOL/L (ref 18–29)
CREAT SERPL-MCNC: 1 MG/DL (ref 0.76–1.27)
ERYTHROCYTE [DISTWIDTH] IN BLOOD BY AUTOMATED COUNT: 14.3 % (ref 12.3–15.4)
GFR SERPLBLD CREATININE-BSD FMLA CKD-EPI: 69 ML/MIN/1.73
GFR SERPLBLD CREATININE-BSD FMLA CKD-EPI: 80 ML/MIN/1.73
GLOBULIN SER CALC-MCNC: 2.6 G/DL (ref 1.5–4.5)
GLUCOSE SERPL-MCNC: 103 MG/DL (ref 65–99)
HCT VFR BLD AUTO: 42.4 % (ref 37.5–51)
HGB BLD-MCNC: 14.4 G/DL (ref 13–17.7)
MCH RBC QN AUTO: 30.4 PG (ref 26.6–33)
MCHC RBC AUTO-ENTMCNC: 34 G/DL (ref 31.5–35.7)
MCV RBC AUTO: 90 FL (ref 79–97)
PLATELET # BLD AUTO: 211 X10E3/UL (ref 150–379)
POTASSIUM SERPL-SCNC: 4.5 MMOL/L (ref 3.5–5.2)
PROT SERPL-MCNC: 7.1 G/DL (ref 6–8.5)
RBC # BLD AUTO: 4.73 X10E6/UL (ref 4.14–5.8)
SODIUM SERPL-SCNC: 142 MMOL/L (ref 134–144)
VIT B12 SERPL-MCNC: 254 PG/ML (ref 232–1245)
WBC # BLD AUTO: 7.3 X10E3/UL (ref 3.4–10.8)

## 2017-12-12 NOTE — PROGRESS NOTES
Please let him know B12 is low that is why he is having tingling in his feet. Needs 1 injection from our office & oral B12 1000 mcg over the counter daily.

## 2017-12-12 NOTE — PROGRESS NOTES
Spoke with patient and confirmed patient identity. Reviewed results with patient he voiced understanding. He will be coming into the office tomorrow to get his B12 injection and will discuss what OTC b12 to get as well.

## 2017-12-13 ENCOUNTER — CLINICAL SUPPORT (OUTPATIENT)
Dept: INTERNAL MEDICINE CLINIC | Age: 82
End: 2017-12-13

## 2017-12-13 ENCOUNTER — DOCUMENTATION ONLY (OUTPATIENT)
Dept: INTERNAL MEDICINE CLINIC | Age: 82
End: 2017-12-13

## 2017-12-13 DIAGNOSIS — E53.8 B12 DEFICIENCY: Primary | ICD-10-CM

## 2017-12-13 RX ORDER — CYANOCOBALAMIN 1000 UG/ML
1000 INJECTION, SOLUTION INTRAMUSCULAR; SUBCUTANEOUS ONCE
Qty: 1 ML | Refills: 0
Start: 2017-12-13 | End: 2017-12-13

## 2017-12-13 RX ORDER — TAMSULOSIN HYDROCHLORIDE 0.4 MG/1
0.4 CAPSULE ORAL DAILY
Qty: 10 CAP | Refills: 0 | Status: SHIPPED | OUTPATIENT
Start: 2017-12-13 | End: 2018-01-03 | Stop reason: SDUPTHER

## 2017-12-13 NOTE — PROGRESS NOTES
B12 injection given to patient in the right deltoid. Patient tolerated well with no complaints at the time.

## 2017-12-13 NOTE — PROGRESS NOTES
Patient came in today for his b12 injection. While here he said that he is having difficulty urinating and has a urologist appointment on Tuesday and cannot wait until then for something. I don't see on his meds where he has had Flomax. Called and lm for patient to return call to office regarding flomax.

## 2017-12-19 ENCOUNTER — HOSPITAL ENCOUNTER (OUTPATIENT)
Dept: CT IMAGING | Age: 82
Discharge: HOME | End: 2017-12-19
Payer: MEDICARE

## 2017-12-19 DIAGNOSIS — R91.1 PULMONARY NODULE: ICD-10-CM

## 2017-12-19 DIAGNOSIS — R10.31 RIGHT LOWER QUADRANT ABDOMINAL PAIN: ICD-10-CM

## 2017-12-19 PROCEDURE — 74177 CT ABD & PELVIS W/CONTRAST: CPT

## 2017-12-19 PROCEDURE — 71260 CT THORAX DX C+: CPT

## 2017-12-19 RX ORDER — SODIUM CHLORIDE 0.9 % (FLUSH) 0.9 %
10 SYRINGE (ML) INJECTION
Status: COMPLETED | OUTPATIENT
Start: 2017-12-19 | End: 2017-12-19

## 2017-12-19 RX ORDER — BARIUM SULFATE 20 MG/ML
900 SUSPENSION ORAL
Status: COMPLETED | OUTPATIENT
Start: 2017-12-19 | End: 2017-12-19

## 2017-12-19 RX ADMIN — BARIUM SULFATE 900 ML: 20 SUSPENSION ORAL at 11:45

## 2017-12-19 RX ADMIN — Medication 10 ML: at 11:45

## 2017-12-20 NOTE — PROGRESS NOTES
Please let him know CT scan showed lung nodule which is same in size as it was 2 years ago. So no further follow up recommended.

## 2017-12-21 DIAGNOSIS — R73.03 PREDIABETES: ICD-10-CM

## 2017-12-21 RX ORDER — PRAVASTATIN SODIUM 20 MG/1
TABLET ORAL
Qty: 90 TAB | Refills: 0 | Status: SHIPPED | OUTPATIENT
Start: 2017-12-21 | End: 2018-03-06 | Stop reason: SDUPTHER

## 2017-12-21 RX ORDER — METFORMIN HYDROCHLORIDE 500 MG/1
TABLET ORAL
Qty: 90 TAB | Refills: 0 | Status: SHIPPED | OUTPATIENT
Start: 2017-12-21 | End: 2018-03-06 | Stop reason: SDUPTHER

## 2017-12-21 NOTE — PROGRESS NOTES
Spoke with patient and confirmed patient identity x 2, reviewed results with patient he voiced understanding. He states that he is still having right side abdominal pain that has not changed for the last few months he would like to know if he should make an appointment to be seen with you or if he is able to see a specialty provider for this.

## 2018-01-03 ENCOUNTER — OFFICE VISIT (OUTPATIENT)
Dept: INTERNAL MEDICINE CLINIC | Age: 83
End: 2018-01-03

## 2018-01-03 VITALS
TEMPERATURE: 97.8 F | WEIGHT: 170.8 LBS | HEART RATE: 57 BPM | HEIGHT: 69 IN | RESPIRATION RATE: 16 BRPM | DIASTOLIC BLOOD PRESSURE: 76 MMHG | OXYGEN SATURATION: 97 % | SYSTOLIC BLOOD PRESSURE: 120 MMHG | BODY MASS INDEX: 25.3 KG/M2

## 2018-01-03 DIAGNOSIS — M48.062 SPINAL STENOSIS OF LUMBAR REGION WITH NEUROGENIC CLAUDICATION: ICD-10-CM

## 2018-01-03 DIAGNOSIS — R39.16 BENIGN PROSTATIC HYPERPLASIA (BPH) WITH STRAINING ON URINATION: ICD-10-CM

## 2018-01-03 DIAGNOSIS — M79.605 PAIN OF LEFT LOWER EXTREMITY: Primary | ICD-10-CM

## 2018-01-03 DIAGNOSIS — N40.1 BENIGN PROSTATIC HYPERPLASIA (BPH) WITH STRAINING ON URINATION: ICD-10-CM

## 2018-01-03 RX ORDER — TAMSULOSIN HYDROCHLORIDE 0.4 MG/1
0.4 CAPSULE ORAL DAILY
Qty: 30 CAP | Refills: 0 | Status: SHIPPED | OUTPATIENT
Start: 2018-01-03 | End: 2022-05-16

## 2018-01-03 NOTE — PROGRESS NOTES
HISTORY OF PRESENT ILLNESS  An oGrdon is a 80 y.o. male. Patient is seen today with left lower leg pain, which has been getting worse for the last couple days, which he describes  as a shooting pain and it gets worse only when he walks a couple of steps. He does not have pain when he is sitting or standing in one position. As soon as he starts walking after a few steps he starts feeling pain and it gets worse if he keeps walking. He does have numbness and tingling sometimes. Recall,  he had a lumbar spine MRI back in 2014, which showed severe spinal stenosis. At that time he refused to go for surgery and he was taking Gabapentin and antiinflammatory medication that helped him with the pain tremendously and he did not need any surgery. But now he started having the same symptoms again. He also wants me to give him a full 30 day  rx of Flomax, which I gave him last time only 10 days  for his urinary urgency symptoms, and it helped him a lot. He does have an enlarged prostate. He has  seen Dr. Cinthya Campa in the past, but has not made any appointment with him lately. Current Outpatient Prescriptions   Medication Sig Dispense Refill    tamsulosin (FLOMAX) 0.4 mg capsule Take 1 Cap by mouth daily. 30 Cap 0    metFORMIN (GLUCOPHAGE) 500 mg tablet TAKE 1 TABLET BY MOUTH DAILY WITH BREAKFAST 90 Tab 0    pravastatin (PRAVACHOL) 20 mg tablet TAKE 1 TABLET BY MOUTH AT BEDTIME 30 Tab 2    VITAMIN D3 2,000 unit cap       aspirin delayed-release (ASPIR-LOW) 81 mg tablet Take 81 mg by mouth daily.       metFORMIN (GLUCOPHAGE) 500 mg tablet TAKE 1 TABLET BY MOUTH DAILY WITH BREAKFAST 90 Tab 0    pravastatin (PRAVACHOL) 20 mg tablet TAKE 1 TABLET BY MOUTH AT BEDTIME 90 Tab 0    LOTEMAX 0.5 % oint   0     Past Medical History:   Diagnosis Date    BPH (benign prostatic hyperplasia)     Hyperlipemia     Hypertension     RA (rheumatoid arthritis) ([facility])        HPI  Review of Systems   Constitutional: Negative for malaise/fatigue. Cardiovascular: Negative for chest pain, palpitations and leg swelling. Gastrointestinal: Negative for abdominal pain and constipation. Genitourinary: Negative for urgency. Musculoskeletal: Positive for joint pain and myalgias. Neurological: Positive for tingling and sensory change. Negative for weakness. Physical Exam   Constitutional: He is oriented to person, place, and time. HENT:   Mouth/Throat: Oropharynx is clear and moist. No oropharyngeal exudate. Eyes: Conjunctivae and EOM are normal.   Neck: Neck supple. Cardiovascular: Normal rate, regular rhythm and normal heart sounds. Pulmonary/Chest: Breath sounds normal. No respiratory distress. Abdominal: Soft. He exhibits no distension. Musculoskeletal: He exhibits no edema. ROM nl on L knee   Lymphadenopathy:     He has no cervical adenopathy. Neurological: He is alert and oriented to person, place, and time. No cranial nerve deficit. Nursing note and vitals reviewed. ASSESSMENT and PLAN    ICD-10-CM ICD-9-CM    1. Pain of left lower extremity M79.605 729.5 REFERRAL TO NEUROSURGERY   2. Spinal stenosis of lumbar region with neurogenic claudication M48.062 724.03 REFERRAL TO NEUROSURGERY  I reviewed the lumbar spine MRI, which was done in 2014 again with him and told him that he needs to make an appointment with the neurosurgeon as soon as possible. I am concerned his symptoms are due to his stenosis and probably in the last two to three years has gotten worse now. Number given to him, referral placed. He will make an appointment ASAP. I told him in the meantime he can alternate Tylenol and ibuprofen for pain.        3. Benign prostatic hyperplasia (BPH) with straining on urination N40.1 600.01 tamsulosin (FLOMAX) 0.4 mg capsule I gave him a 30 day supply of Flomax, but also encouraged him to make an appointment with Dr. Louis Schirmer since he has not  seen him in a while.        R39.16 788.65

## 2018-01-03 NOTE — PROGRESS NOTES
Chief Complaint   Patient presents with    Leg Pain     states that he is still having a lot of left leg pain and is wondering if the severe degenerative disk disease has to do with the leg pain.   also is taking tamsulosin and only got 10 pills and thinks maybe he should have more

## 2018-01-31 ENCOUNTER — HOSPITAL ENCOUNTER (OUTPATIENT)
Dept: MRI IMAGING | Age: 83
Discharge: HOME | End: 2018-01-31
Payer: MEDICARE

## 2018-01-31 DIAGNOSIS — M51.36 DDD (DEGENERATIVE DISC DISEASE), LUMBAR: ICD-10-CM

## 2018-01-31 PROCEDURE — 72148 MRI LUMBAR SPINE W/O DYE: CPT

## 2018-02-08 ENCOUNTER — OFFICE VISIT (OUTPATIENT)
Dept: INTERNAL MEDICINE CLINIC | Age: 83
End: 2018-02-08

## 2018-02-08 VITALS
HEART RATE: 57 BPM | RESPIRATION RATE: 16 BRPM | TEMPERATURE: 97.9 F | HEIGHT: 69 IN | DIASTOLIC BLOOD PRESSURE: 68 MMHG | BODY MASS INDEX: 25.21 KG/M2 | WEIGHT: 170.2 LBS | OXYGEN SATURATION: 98 % | SYSTOLIC BLOOD PRESSURE: 142 MMHG

## 2018-02-08 DIAGNOSIS — Z71.89 ADVANCE CARE PLANNING: ICD-10-CM

## 2018-02-08 DIAGNOSIS — Z11.59 NEED FOR HEPATITIS C SCREENING TEST: ICD-10-CM

## 2018-02-08 DIAGNOSIS — Z00.00 MEDICARE ANNUAL WELLNESS VISIT, SUBSEQUENT: Primary | ICD-10-CM

## 2018-02-08 RX ORDER — FINASTERIDE 5 MG/1
TABLET, FILM COATED ORAL
Refills: 99 | COMMUNITY
Start: 2018-02-01 | End: 2019-08-09 | Stop reason: ALTCHOICE

## 2018-02-08 NOTE — PROGRESS NOTES
NN Medicare Wellness Visit      Radha Dao is a 80 y.o. male and presents for Annual Medicare Wellness Visit. Assessment of cognitive impairment: Alert and oriented x 3     Depression Screen:   PHQ over the last two weeks 2/8/2018   Little interest or pleasure in doing things Not at all   Feeling down, depressed or hopeless Not at all   Total Score PHQ 2 0       Fall Risk Assessment:    Fall Risk Assessment, last 12 mths 2/8/2018   Able to walk? Yes   Fall in past 12 months? No   Fall with injury? -   Number of falls in past 12 months -   Fall Risk Score -       Abuse Screen:   Abuse Screening Questionnaire 2/8/2018   Do you ever feel afraid of your partner? N   Are you in a relationship with someone who physically or mentally threatens you? N   Is it safe for you to go home? Y       Activities of Daily Living:  Self-care. Requires assistance with: no ADLs  Patient handle his/her own medications  yes Use of pill box  no  Activities of Daily Living:   ADL Assessment 2/8/2018   Feeding yourself No Help Needed   Getting from bed to chair No Help Needed   Getting dressed No Help Needed   Bathing or showering No Help Needed   Walk across the room (includes cane/walker) No Help Needed   Using the telphone No Help Needed   Taking your medications No Help Needed   Preparing meals No Help Needed   Managing money (expenses/bills) No Help Needed   Moderately strenuous housework (laundry) No Help Needed   Shopping for personal items (toiletries/medicines) No Help Needed   Shopping for groceries No Help Needed   Driving No Help Needed   Climbing a flight of stairs No Help Needed   Getting to places beyond walking distances No Help Needed       Health Maintenance:  Daily Aspirin: yes   Bone Density: not up to date -   Glaucoma Screening: yes next due 8/11/2019  Immunizations:    Tetanus: declines- . Influenza: patient declines. Shingles: not up to date - . PPSV-23: not up to date - .  Prevnar-13: not up to date - .    Cancer screening  Colon: up to date. Prostate:  up to date saw yesterday [facility] and will be sending copy of visit to this office    Alcohol Risk Screen:   On any occasion during the past 3 months, have you had more than 3 drinks(female) or 4 drinks (male) containing alcohol in one? No  Do you average more than 7 drinks (female) or 14 drinks (male) per week? No  Type and amount:n/a    Hearing Loss:  Hearing is good. denies any hearing loss wears hearing aides    Vision Loss:   Wears glasses,  Yes reading    Adult Nutrition Screen:  No risk factors noted. Advance Care Planning:   End of Life Planning: has NO advanced directive  - add't info requested. Referral to : yes, has NO advanced directive  - add't info provided DNR/DNI   Ariel Parker ACP-Facilitator appointment no      Medications/Allergies: Reviewed with patient  Prior to Admission medications    Medication Sig Start Date End Date Taking? Authorizing Provider   finasteride (PROSCAR) 5 mg tablet TAKE 1 TABLET BY MOUTH EVERY DAY 2/1/18  Yes Historical Provider   tamsulosin (FLOMAX) 0.4 mg capsule Take 1 Cap by mouth daily. 1/3/18  Yes Vivi Moreno MD   metFORMIN (GLUCOPHAGE) 500 mg tablet TAKE 1 TABLET BY MOUTH DAILY WITH BREAKFAST 8/6/17  Yes Vivi Moreno MD   pravastatin (PRAVACHOL) 20 mg tablet TAKE 1 TABLET BY MOUTH AT BEDTIME 7/3/17  Yes Maral Garcia NP   LOTEMAX 0.5 % oint  7/22/15  Yes Historical Provider   VITAMIN D3 2,000 unit cap  4/5/14  Yes Historical Provider   aspirin delayed-release (ASPIR-LOW) 81 mg tablet Take 81 mg by mouth daily.    Yes Historical Provider   metFORMIN (GLUCOPHAGE) 500 mg tablet TAKE 1 TABLET BY MOUTH DAILY WITH BREAKFAST 12/21/17   Vivi Moreno MD   pravastatin (PRAVACHOL) 20 mg tablet TAKE 1 TABLET BY MOUTH AT BEDTIME 12/21/17   Vivi Moreno MD       No Known Allergies    Objective:  Visit Vitals    /68 (BP 1 Location: Right arm, BP Patient Position: Sitting)    Pulse (!) 57  Temp 97.9 °F (36.6 °C) (Oral)    Resp 16    Ht 5' 9\" (1.753 m)    Wt 170 lb 3.2 oz (77.2 kg)    SpO2 98%    BMI 25.13 kg/m2    Body mass index is 25.13 kg/(m^2). Problem List: Reviewed with patient and discussed risk factors. Patient Active Problem List   Diagnosis Code    Coronary atherosclerosis of native coronary artery I25.10    Hyperlipidemia E78.5    Snoring R06.83    Fatigue R53.83    Headache(784.0) R51    Benign prostatic hyperplasia (BPH) with straining on urination N40.1, R39.16       PSH: Reviewed with patient  History reviewed. No pertinent surgical history. SH: Reviewed with patient  Social History   Substance Use Topics    Smoking status: Former Smoker     Packs/day: 1.00     Years: 15.00     Types: Cigarettes     Quit date: 3/19/1975    Smokeless tobacco: Never Used    Alcohol use No       FH: Reviewed with patient  Family History   Problem Relation Age of Onset    Family history unknown: Yes       Current medical providers:    Patient Care Team:  Dulce Valdes MD as PCP - General (Internal Medicine)  Dr. Itzel Garcia , Urology    Plan:    Diagnoses and all orders for this visit:    1. Medicare annual wellness visit, subsequent   Immunizations & health screening discussed with him. He will find out from the insurance if his pneumonia , shingle or Tetanus shots are covered. 2. Advance care planning   Advanced directive discussed with him. He will make an appointment with [facility].     3. Need for hepatitis C screening test  -     HEPATITIS C AB        Orders Placed This Encounter    finasteride (PROSCAR) 5 mg tablet       Health Maintenance   Topic Date Due    DTaP/Tdap/Td series (1 - Tdap) 06/24/1955    ZOSTER VACCINE AGE 60>  04/24/1994    Pneumococcal 65+ Low/Medium Risk (1 of 2 - PCV13) 06/24/1999    MEDICARE YEARLY EXAM  02/09/2019    GLAUCOMA SCREENING Q2Y  08/11/2019    COLONOSCOPY  11/04/2019    Influenza Age 5 to Adult  Addressed          Urinary/ Fecal Incontinence: no    Regular physical exercise: not much due to the back pain. Just walks around the house    Patient verbalized understanding of information presented. AVS and Medicare Part B Preventive Services Table printed and given to pt and reviewed. See table for findings under Recommendation and Scheduled. All of the patient's questions were answered.

## 2018-02-08 NOTE — ACP (ADVANCE CARE PLANNING)
Advance Care Planning (ACP) Provider Conversation Snapshot    Date of ACP Conversation: 02/08/18  Persons included in Conversation:  patient  Length of ACP Conversation in minutes:  16 minutes            For Patients with Decision Making Capacity:   Values/Goals: Exploration of values, goals, and preferences if recovery is not expected, even with continued medical treatment in the event of:  Imminent death    Conversation Outcomes / Follow-Up Plan:   Recommended completion of Advance Directive form after review of ACP materials and conversation with prospective healthcare agent

## 2018-02-08 NOTE — PATIENT INSTRUCTIONS
Medicare Part B Preventive Services Guidelines/Limitations Date 2/8/2018 Due Date 2/8/2018   Bone Mass Measurement  (age 72 & older, biennial) Requires diagnosis related to osteoporosis or estrogen deficiency. Biennial benefit unless patient has history of long-term glucocorticoid tx or baseline is needed because initial test was by other method or for  patients with vertebral abnormalities on x-ray Completed     Recommended every 2 years As recommended by your PCP or Specialist     Cardiovascular Screening Blood Tests (every 5 years)  Total cholesterol, HDL, Triglycerides Order as a panel if possible Completed     As recommended by your PCP As recommended by your PCP or Specialist   HIV Screening  HIV Screening (includes patients at high risk and includes any patient that requests the test and pregnant women Covered once every 12 months. Hepatitis C screening tests Indicated for patients with at least one of the following: current or past history of IV drug use, those who had blood transfusion before 1992, those born between [facility]. Colorectal Cancer Screening  -Fecal occult blood test (annual)  -Flexible sigmoidoscopy (5y)  -Screening colonoscopy (10y)  -Barium Enema Age 49-80;  After age [de-identified] if history of abnormal results Completed      Recommended every 5 to 10 years  As recommended by your PCP or Specialist     Hepatitis B vaccines  (covered for patients at high risk- hemophilia, ESRD, DM, body fluid contact        Prostate Cancer Screening (annually up to age 76)  -Digital rectal exam  -Prostate specific antigen (PSA)   Three shot series covered once              Annually (age 48 or over), SUJEY not paid separately when covered E/M service is provided on the same date             Completed        Recommended  annually             Due   Seasonal Influenza Vaccination (annually)  Completed   Recommended Annually declined   TDAP Vaccination Covered by Medicare part D through the pharmacy- PCP provides prescription     Recommended every 10 years Due    Zoster (Shingles) Vaccination Covered by Medicare Part D through the pharmacy- PCP provides prescription     Recommended once over age 48  due   Pneumococcal Vaccination (once after 72)    Recommended once over the age of 72     due           Prevnar 15 vaccine  Prevnar 15 - Recommended once over the age of 72    Lung Cancer Screening-with low dose CT for patientswho meet all criteria:   -Age 50-69  -either a current smoker or have quit in the past 15 yrs  -have a smoking history of 30 pack years or more   Covered every 12 months       Please contact Selam FERRARA/Nurse Navigator with any questions about your visit or instructions today. Thank you for the opportunity for us to participate in your care.

## 2018-02-14 ENCOUNTER — DOCUMENTATION ONLY (OUTPATIENT)
Dept: INTERNAL MEDICINE CLINIC | Age: 83
End: 2018-02-14

## 2018-03-06 DIAGNOSIS — R73.03 PREDIABETES: ICD-10-CM

## 2018-03-06 RX ORDER — METFORMIN HYDROCHLORIDE 500 MG/1
TABLET ORAL
Qty: 90 TAB | Refills: 0 | Status: SHIPPED | OUTPATIENT
Start: 2018-03-06 | End: 2018-04-08 | Stop reason: SDUPTHER

## 2018-03-06 RX ORDER — PRAVASTATIN SODIUM 20 MG/1
TABLET ORAL
Qty: 90 TAB | Refills: 0 | Status: SHIPPED | OUTPATIENT
Start: 2018-03-06 | End: 2018-04-08 | Stop reason: SDUPTHER

## 2018-03-15 DIAGNOSIS — R05.3 COUGH, PERSISTENT: ICD-10-CM

## 2018-03-15 RX ORDER — BENZONATATE 200 MG/1
CAPSULE ORAL
Qty: 21 CAP | Refills: 0 | Status: SHIPPED | OUTPATIENT
Start: 2018-03-15 | End: 2021-07-13 | Stop reason: ALTCHOICE

## 2018-04-08 DIAGNOSIS — R73.03 PREDIABETES: ICD-10-CM

## 2018-04-08 RX ORDER — PRAVASTATIN SODIUM 20 MG/1
TABLET ORAL
Qty: 90 TAB | Refills: 0 | Status: SHIPPED | OUTPATIENT
Start: 2018-04-08 | End: 2018-09-08 | Stop reason: SDUPTHER

## 2018-04-08 RX ORDER — METFORMIN HYDROCHLORIDE 500 MG/1
TABLET ORAL
Qty: 90 TAB | Refills: 0 | Status: SHIPPED | OUTPATIENT
Start: 2018-04-08 | End: 2018-09-08 | Stop reason: SDUPTHER

## 2018-09-08 DIAGNOSIS — R73.03 PREDIABETES: ICD-10-CM

## 2018-09-10 RX ORDER — PRAVASTATIN SODIUM 20 MG/1
TABLET ORAL
Qty: 90 TAB | Refills: 0 | Status: SHIPPED | OUTPATIENT
Start: 2018-09-10 | End: 2019-01-04 | Stop reason: SDUPTHER

## 2018-09-10 RX ORDER — METFORMIN HYDROCHLORIDE 500 MG/1
TABLET ORAL
Qty: 90 TAB | Refills: 0 | Status: SHIPPED | OUTPATIENT
Start: 2018-09-10 | End: 2019-01-04 | Stop reason: SDUPTHER

## 2018-09-24 DIAGNOSIS — Z00.00 ANNUAL PHYSICAL EXAM: Primary | ICD-10-CM

## 2018-09-25 LAB
ALBUMIN SERPL-MCNC: 4.4 G/DL (ref 3.5–4.7)
ALBUMIN/GLOB SERPL: 1.4 {RATIO} (ref 1.2–2.2)
ALP SERPL-CCNC: 83 IU/L (ref 39–117)
ALT SERPL-CCNC: 15 IU/L (ref 0–44)
AST SERPL-CCNC: 23 IU/L (ref 0–40)
BASOPHILS # BLD AUTO: 0 X10E3/UL (ref 0–0.2)
BASOPHILS NFR BLD AUTO: 0 %
BILIRUB SERPL-MCNC: 0.5 MG/DL (ref 0–1.2)
BUN SERPL-MCNC: 18 MG/DL (ref 8–27)
BUN/CREAT SERPL: 17 (ref 10–24)
CALCIUM SERPL-MCNC: 9.7 MG/DL (ref 8.6–10.2)
CHLORIDE SERPL-SCNC: 104 MMOL/L (ref 96–106)
CHOLEST SERPL-MCNC: 137 MG/DL (ref 100–199)
CO2 SERPL-SCNC: 20 MMOL/L (ref 20–29)
CREAT SERPL-MCNC: 1.03 MG/DL (ref 0.76–1.27)
EOSINOPHIL # BLD AUTO: 0.2 X10E3/UL (ref 0–0.4)
EOSINOPHIL NFR BLD AUTO: 4 %
ERYTHROCYTE [DISTWIDTH] IN BLOOD BY AUTOMATED COUNT: 14.4 % (ref 12.3–15.4)
EST. AVERAGE GLUCOSE BLD GHB EST-MCNC: 120 MG/DL
GLOBULIN SER CALC-MCNC: 3.1 G/DL (ref 1.5–4.5)
GLUCOSE SERPL-MCNC: 100 MG/DL (ref 65–99)
HBA1C MFR BLD: 5.8 % (ref 4.8–5.6)
HCT VFR BLD AUTO: 42.8 % (ref 37.5–51)
HCV AB S/CO SERPL IA: <0.1 S/CO RATIO (ref 0–0.9)
HDLC SERPL-MCNC: 43 MG/DL
HGB BLD-MCNC: 14.4 G/DL (ref 13–17.7)
IMM GRANULOCYTES # BLD: 0 X10E3/UL (ref 0–0.1)
IMM GRANULOCYTES NFR BLD: 0 %
LDLC SERPL CALC-MCNC: 76 MG/DL (ref 0–99)
LYMPHOCYTES # BLD AUTO: 2 X10E3/UL (ref 0.7–3.1)
LYMPHOCYTES NFR BLD AUTO: 32 %
MCH RBC QN AUTO: 29.8 PG (ref 26.6–33)
MCHC RBC AUTO-ENTMCNC: 33.6 G/DL (ref 31.5–35.7)
MCV RBC AUTO: 88 FL (ref 79–97)
MONOCYTES # BLD AUTO: 0.6 X10E3/UL (ref 0.1–0.9)
MONOCYTES NFR BLD AUTO: 9 %
NEUTROPHILS # BLD AUTO: 3.3 X10E3/UL (ref 1.4–7)
NEUTROPHILS NFR BLD AUTO: 55 %
PLATELET # BLD AUTO: 201 X10E3/UL (ref 150–379)
POTASSIUM SERPL-SCNC: 4.6 MMOL/L (ref 3.5–5.2)
PROT SERPL-MCNC: 7.5 G/DL (ref 6–8.5)
RBC # BLD AUTO: 4.84 X10E6/UL (ref 4.14–5.8)
SODIUM SERPL-SCNC: 141 MMOL/L (ref 134–144)
TRIGL SERPL-MCNC: 89 MG/DL (ref 0–149)
VLDLC SERPL CALC-MCNC: 18 MG/DL (ref 5–40)
WBC # BLD AUTO: 6.1 X10E3/UL (ref 3.4–10.8)

## 2018-09-28 ENCOUNTER — TELEPHONE (OUTPATIENT)
Dept: PRIMARY CARE CLINIC | Age: 83
End: 2018-09-28

## 2018-09-28 NOTE — TELEPHONE ENCOUNTER
Patient returned call re: request for lab results. After confirming patient identifiers let him know that per Dr. Gaye Escobar:  His blood report came back fine including cholesterol numbers & HbA1C. Patient requests transfer to PSRs to schedule a follow-up appointment and denies any further questions or concerns at this time. Encouraged to phone the office as needed. End of encounter.

## 2018-10-01 ENCOUNTER — OFFICE VISIT (OUTPATIENT)
Dept: PRIMARY CARE CLINIC | Age: 83
End: 2018-10-01

## 2018-10-01 VITALS
TEMPERATURE: 98 F | BODY MASS INDEX: 23.34 KG/M2 | WEIGHT: 157.6 LBS | HEIGHT: 69 IN | DIASTOLIC BLOOD PRESSURE: 80 MMHG | SYSTOLIC BLOOD PRESSURE: 132 MMHG | RESPIRATION RATE: 14 BRPM | HEART RATE: 58 BPM | OXYGEN SATURATION: 96 %

## 2018-10-01 DIAGNOSIS — N40.1 BENIGN PROSTATIC HYPERPLASIA WITH WEAK URINARY STREAM: ICD-10-CM

## 2018-10-01 DIAGNOSIS — R63.4 WEIGHT LOSS: ICD-10-CM

## 2018-10-01 DIAGNOSIS — E11.9 DIABETES MELLITUS TYPE 2, DIET-CONTROLLED (HCC): ICD-10-CM

## 2018-10-01 DIAGNOSIS — E78.2 MIXED HYPERLIPIDEMIA: Primary | ICD-10-CM

## 2018-10-01 DIAGNOSIS — E53.8 B12 DEFICIENCY: ICD-10-CM

## 2018-10-01 DIAGNOSIS — R39.12 BENIGN PROSTATIC HYPERPLASIA WITH WEAK URINARY STREAM: ICD-10-CM

## 2018-10-01 DIAGNOSIS — G44.209 TENSION-TYPE HEADACHE, NOT INTRACTABLE, UNSPECIFIED CHRONICITY PATTERN: ICD-10-CM

## 2018-10-01 NOTE — PROGRESS NOTES
Visit Vitals  /79 (BP 1 Location: Left arm, BP Patient Position: Sitting)  Pulse (!) 58  Temp 98 °F (36.7 °C) (Oral)  Resp 14  
 Ht 5' 9\" (1.753 m)  Wt 157 lb 9.6 oz (71.5 kg)  SpO2 96%  BMI 23.27 kg/m2 Chief Complaint Patient presents with  Follow-up Labs Patient c/o \"hissing in both ears, but my right one is weaker than the other, sometimes this turns into pain and a headache, but not at the moment. \" 1. Have you been to the ER, urgent care clinic since your last visit? Hospitalized since your last visit? Denies 2. Have you seen or consulted any other health care providers outside of the [address] [place] [address] since your last visit? Include any pap smears or colon screening. Denies

## 2018-10-01 NOTE — MR AVS SNAPSHOT
Steffen Johnson 
 
 
 [address] [place] [address] 
[phone] Patient: Felicita Rubio MRN: [id number] SCOOBY:[birth year] Visit Information Date & Time Provider Department Dept. Phone Encounter #  
 10/1/2018  1:00 PM Evaristo Levy MD [facility] 2. [phone] [id number] Upcoming Health Maintenance Date Due DTaP/Tdap/Td series (1 - Tdap) 6/24/1955 Shingrix Vaccine Age 50> (1 of 2) 6/24/1984 Pneumococcal 65+ Low/Medium Risk (1 of 2 - PCV13) 6/24/1999 Influenza Age 5 to Adult 8/1/2018 MEDICARE YEARLY EXAM 2/9/2019 GLAUCOMA SCREENING Q2Y 8/11/2019 COLONOSCOPY 11/4/2019 Allergies as of 10/1/2018  Review Complete On: 10/1/2018 By: Evaristo Levy MD  
 No Known Allergies Current Immunizations  Never Reviewed No immunizations on file. Not reviewed this visit You Were Diagnosed With   
  
 Codes Comments Mixed hyperlipidemia    -  Primary ICD-10-CM: C13.8 ICD-9-CM: 272.2 Diabetes mellitus type 2, diet-controlled ([facility] [address]     ICD-10-CM: E11.9 ICD-9-CM: 250.00 Benign prostatic hyperplasia with weak urinary stream     ICD-10-CM: N40.1, R39.12 
ICD-9-CM: 600.01, 788.62 Tension-type headache, not intractable, unspecified chronicity pattern     ICD-10-CM: G44.209 ICD-9-CM: 339.10 Weight loss     ICD-10-CM: R63.4 ICD-9-CM: 783.21   
 B12 deficiency     ICD-10-CM: E53.8 ICD-9-CM: 266.2 Vitals BP Pulse Temp Resp Height(growth percentile) Weight(growth percentile) 132/80 (BP 1 Location: Left arm, BP Patient Position: Sitting) (!) 58 98 °F (36.7 °C) (Oral) 14 5' 9\" (1.753 m) 157 lb 9.6 oz (71.5 kg) SpO2 BMI Smoking Status 96% 23.27 kg/m2 Former Smoker Vitals History BMI and BSA Data Body Mass Index Body Surface Area  
 23.27 kg/m 2 1.87 m 2 Preferred Pharmacy Pharmacy Name Phone [id number]- [address], [address] [phone] Your Updated Medication List  
  
   
This list is accurate as of 10/1/18  1:48 PM.  Always use your most recent med list.  
  
  
  
  
 ASPIR-LOW 81 mg tablet Generic drug:  aspirin delayed-release Take 81 mg by mouth daily. benzonatate 200 mg capsule Commonly known as:  TESSALON  
TAKE 1 CAP BY MOUTH THREE (3) TIMES DAILY AS NEEDED FOR COUGH FOR UP TO 7 DAYS. finasteride 5 mg tablet Commonly known as:  PROSCAR  
TAKE 1 TABLET BY MOUTH EVERY DAY  
  
 LOTEMAX 0.5 % Oint Generic drug:  loteprednol etabonate * metFORMIN 500 mg tablet Commonly known as:  GLUCOPHAGE  
TAKE 1 TABLET BY MOUTH DAILY WITH BREAKFAST * metFORMIN 500 mg tablet Commonly known as:  GLUCOPHAGE  
TAKE 1 TABLET BY MOUTH EVERY DAY WITH BREAKFAST * pravastatin 20 mg tablet Commonly known as:  PRAVACHOL  
TAKE 1 TABLET BY MOUTH AT BEDTIME  
  
 * pravastatin 20 mg tablet Commonly known as:  PRAVACHOL  
TAKE 1 TABLET BY MOUTH AT BEDTIME  
  
 tamsulosin 0.4 mg capsule Commonly known as:  FLOMAX Take 1 Cap by mouth daily. VITAMIN D3 2,000 unit Cap capsule Generic drug:  Cholecalciferol (Vitamin D3) * Notice: This list has 4 medication(s) that are the same as other medications prescribed for you. Read the directions carefully, and ask your doctor or other care provider to review them with you. Introducing [facility]! [employer] introduces SanteVet patient portal. Now you can access parts of your medical record, email your doctor's office, and request medication refills online. 1. In your internet browser, go to [email or web address] 2. Click on the First Time User? Click Here link in the Sign In box. You will see the New Member Sign Up page. 3. Enter your SanteVet Access Code exactly as it appears below.  You will not need to use this code after youve completed the sign-up process. If you do not sign up before the expiration date, you must request a new code. · Ziptr Access Code: [email or web address] Expires: 12/30/2018  1:48 PM 
 
4. Enter the last four digits of your Social Security Number (xxxx) and Date of Birth (mm/dd/yyyy) as indicated and click Submit. You will be taken to the next sign-up page. 5. Create a Ziptr ID. This will be your Ziptr login ID and cannot be changed, so think of one that is secure and easy to remember. 6. Create a Ziptr password. You can change your password at any time. 7. Enter your Password Reset Question and Answer. This can be used at a later time if you forget your password. 8. Enter your e-mail address. You will receive e-mail notification when new information is available in [address] 9. Click Sign Up. You can now view and download portions of your medical record. 10. Click the Download Summary menu link to download a portable copy of your medical information. If you have questions, please visit the Frequently Asked Questions section of the Ziptr website. Remember, Ziptr is NOT to be used for urgent needs. For medical emergencies, dial 911. Now available from your iPhone and Android! Please provide this summary of care documentation to your next provider. Your primary care clinician is listed as Joshua Underwood. If you have any questions after today's visit, please call [phone].

## 2018-10-01 NOTE — PROGRESS NOTES
Written by Linda Hernández, as dictated by Dr. Carey Egan MD. 
 
[address] [facility] Renato Back is a 80 y.o. male. HPI The patient presents today for a follow-up to discuss labs drawn on 09/24. His cholesterol is good and within normal range. His HA1c was 5.8%, improved from 7.1%. His fasting glucose was 100. BP is high today at 144/79, 132/80 on repeat. He notes that he has been having trouble with his head and notes that sometimes it turns into a headache on his L side. He notes that recently he has been hearing hissing and notes that it may be caused by his ears. The patient notes that when he does not eat dinner, he does not experience headaches the next day. He has noticed that he sometimes experiences headaches if he ate dinner the day before. Since eliminating dinner, his headaches have been less frequent. He weighs 157 lbs today and has lost weight from 170 in 02/2018. He notes that for a while he was not eating well, but notes that he has been eating better recently. He reports that he was being cautious with his diet because he was worried about his cholesterol and BS. His breakfast consists of 1 boiled egg and little bread. Lunch consists of chicken breast and vegetables. He does not eat dinner. The patient notes that he also eats fish and beef. He was referred to GI in 2017 and does not recall having a colonoscopy. He denies constipation. He is compliant on metformin 500 mg once daily and pravastatin 20 mg in the evening. He reports that he does not snore and he is not feeling tired anymore. He reports that he is also taking vitamin B12. He notes that he has a lump above the back of his L ankle, which he notes has been decreasing in size and is a little tender now. The patient notes that sometimes his nose starts running and he sneezes, but notes that his sxs improve after about 15 minutes. He is followed by urology and notes that his urinary flow is not bad. He is taking Flomax and reports that it is working well. He last saw Dr. Eliza Balderrama 6 months ago. Patient Active Problem List  
Diagnosis Code  Coronary atherosclerosis of native coronary artery I25.10  Hyperlipidemia E78.5  Headache(784.0) R51  Benign prostatic hyperplasia with weak urinary stream N40.1, R39.12 Current Outpatient Prescriptions on File Prior to Visit Medication Sig Dispense Refill  metFORMIN (GLUCOPHAGE) 500 mg tablet TAKE 1 TABLET BY MOUTH EVERY DAY WITH BREAKFAST 90 Tab 0  pravastatin (PRAVACHOL) 20 mg tablet TAKE 1 TABLET BY MOUTH AT BEDTIME 90 Tab 0  
 finasteride (PROSCAR) 5 mg tablet TAKE 1 TABLET BY MOUTH EVERY DAY  99  
 metFORMIN (GLUCOPHAGE) 500 mg tablet TAKE 1 TABLET BY MOUTH DAILY WITH BREAKFAST 90 Tab 0  
 LOTEMAX 0.5 % oint   0  
 VITAMIN D3 2,000 unit cap  aspirin delayed-release (ASPIR-LOW) 81 mg tablet Take 81 mg by mouth daily.  benzonatate (TESSALON) 200 mg capsule TAKE 1 CAP BY MOUTH THREE (3) TIMES DAILY AS NEEDED FOR COUGH FOR UP TO 7 DAYS. 21 Cap 0  
 tamsulosin (FLOMAX) 0.4 mg capsule Take 1 Cap by mouth daily. 30 Cap 0  
 pravastatin (PRAVACHOL) 20 mg tablet TAKE 1 TABLET BY MOUTH AT BEDTIME 30 Tab 2 No current facility-administered medications on file prior to visit. Past Medical History:  
Diagnosis Date  BPH (benign prostatic hyperplasia)  Hyperlipemia  Hypertension  RA (rheumatoid arthritis) ([facility] [address] Family History Problem Relation Age of Onset  Family history unknown: Yes  
 
 
Social History Social History  Marital status:  Spouse name: N/A  
 Number of children: N/A  
 Years of education: N/A Occupational History  Not on file. Social History Main Topics  Smoking status: Former Smoker Packs/day: 1.00 Years: 15.00 Types: Cigarettes Quit date: 3/19/1975  Smokeless tobacco: Never Used  Alcohol use No  
 Drug use: No  
 Sexual activity: Yes  
  Partners: Female Other Topics Concern  Not on file Social History Narrative Orders Only on 09/24/2018 Component Date Value Ref Range Status  Cholesterol, total 09/24/2018 137  100 - 199 mg/dL Final  
 Triglyceride 09/24/2018 89  0 - 149 mg/dL Final  
 HDL Cholesterol 09/24/2018 43  >39 mg/dL Final  
 VLDL, calculated 09/24/2018 18  5 - 40 mg/dL Final  
 LDL, calculated 09/24/2018 76  0 - 99 mg/dL Final  
 Hep C Virus Ab 09/24/2018 <0.1  0.0 - 0.9 s/co ratio Final  
 Glucose 09/24/2018 100* 65 - 99 mg/dL Final  
 BUN 09/24/2018 18  8 - 27 mg/dL Final  
 Creatinine 09/24/2018 1.03  0.76 - 1.27 mg/dL Final  
 GFR est non-AA 09/24/2018 66  >59 mL/min/1.73 Final  
 GFR est AA 09/24/2018 77  >59 mL/min/1.73 Final  
 BUN/Creatinine ratio 09/24/2018 17  10 - 24 Final  
 Sodium 09/24/2018 141  134 - 144 mmol/L Final  
 Potassium 09/24/2018 4.6  3.5 - 5.2 mmol/L Final  
 Chloride 09/24/2018 104  96 - 106 mmol/L Final  
 CO2 09/24/2018 20  20 - 29 mmol/L Final  
 Calcium 09/24/2018 9.7  8.6 - 10.2 mg/dL Final  
 Protein, total 09/24/2018 7.5  6.0 - 8.5 g/dL Final  
 Albumin 09/24/2018 4.4  3.5 - 4.7 g/dL Final  
 GLOBULIN, TOTAL 09/24/2018 3.1  1.5 - 4.5 g/dL Final  
 A-G Ratio 09/24/2018 1.4  1.2 - 2.2 Final  
 Bilirubin, total 09/24/2018 0.5  0.0 - 1.2 mg/dL Final  
 Alk.  phosphatase 09/24/2018 83  39 - 117 IU/L Final  
 AST (SGOT) 09/24/2018 23  0 - 40 IU/L Final  
 ALT (SGPT) 09/24/2018 15  0 - 44 IU/L Final  
 WBC 09/24/2018 6.1  3.4 - 10.8 x10E3/uL Final  
 RBC 09/24/2018 4.84  4.14 - 5.80 x10E6/uL Final  
 HGB 09/24/2018 14.4  13.0 - 17.7 g/dL Final  
 HCT 09/24/2018 42.8  37.5 - 51.0 % Final  
 MCV 09/24/2018 88  79 - 97 fL Final  
 MCH 09/24/2018 29.8  26.6 - 33.0 pg Final  
 MCHC 09/24/2018 33.6  31.5 - 35.7 g/dL Final  
 RDW 09/24/2018 14.4  12.3 - 15.4 % Final  
  PLATELET 12/39/1475 396  150 - 379 x10E3/uL Final  
 NEUTROPHILS 09/24/2018 55  Not Estab. % Final  
 Lymphocytes 09/24/2018 32  Not Estab. % Final  
 MONOCYTES 09/24/2018 9  Not Estab. % Final  
 EOSINOPHILS 09/24/2018 4  Not Estab. % Final  
 BASOPHILS 09/24/2018 0  Not Estab. % Final  
 ABS. NEUTROPHILS 09/24/2018 3.3  1.4 - 7.0 x10E3/uL Final  
 Abs Lymphocytes 09/24/2018 2.0  0.7 - 3.1 x10E3/uL Final  
 ABS. MONOCYTES 09/24/2018 0.6  0.1 - 0.9 x10E3/uL Final  
 ABS. EOSINOPHILS 09/24/2018 0.2  0.0 - 0.4 x10E3/uL Final  
 ABS. BASOPHILS 09/24/2018 0.0  0.0 - 0.2 x10E3/uL Final  
 IMMATURE GRANULOCYTES 09/24/2018 0  Not Estab. % Final  
 ABS. IMM. GRANS. 09/24/2018 0.0  0.0 - 0.1 x10E3/uL Final  
 Hemoglobin A1c 09/24/2018 5.8* 4.8 - 5.6 % Final  
 Estimated average glucose 09/24/2018 120  mg/dL Final  
 
 
Review of Systems Constitutional: Positive for weight loss. Negative for malaise/fatigue. HENT: Negative for congestion. Eyes: Negative for blurred vision and pain. Respiratory: Negative for cough and shortness of breath. Cardiovascular: Negative for chest pain and palpitations. Gastrointestinal: Negative for abdominal pain and heartburn. Genitourinary: Negative for frequency and urgency. Musculoskeletal: Negative for joint pain and myalgias. Neurological: Positive for headaches. Negative for dizziness, tingling, sensory change and weakness. Occasional headache Endo/Heme/Allergies: Positive for environmental allergies. Psychiatric/Behavioral: Negative for depression, memory loss and substance abuse. Visit Vitals  /80 (BP 1 Location: Left arm, BP Patient Position: Sitting)  Pulse (!) 58  Temp 98 °F (36.7 °C) (Oral)  Resp 14  
 Ht 5' 9\" (1.753 m)  Wt 157 lb 9.6 oz (71.5 kg)  SpO2 96%  BMI 23.27 kg/m2 Physical Exam  
Constitutional: He is oriented to person, place, and time.  He appears well-developed and well-nourished. No distress. HENT:  
Right Ear: External ear normal.  
Left Ear: External ear normal.  
Eyes: Conjunctivae and EOM are normal. Right eye exhibits no discharge. Left eye exhibits no discharge. Neck: Normal range of motion. Neck supple. Cardiovascular: Normal rate and regular rhythm. Pulmonary/Chest: Effort normal and breath sounds normal. He has no wheezes. Abdominal: Soft. Bowel sounds are normal. There is no tenderness. Lymphadenopathy:  
  He has no cervical adenopathy. Neurological: He is alert and oriented to person, place, and time. Skin: He is not diaphoretic. Lipoma above back of L ankle Psychiatric: He has a normal mood and affect. His behavior is normal.  
Nursing note and vitals reviewed. ASSESSMENT and PLAN 
  ICD-10-CM ICD-9-CM 1. Mixed hyperlipidemia E78.2 272.2 Compliant on pravastatin and doing well. I will repeat his labs in 3 months. 2. Diabetes mellitus type 2, diet-controlled (HCC) E11.9 250.00 He should stop taking metformin and continue following his current diet. Discussed that he can have rice 2 times weekly if he wants. If he continues to lose weight, I will refer him to GI for colonoscopy. 3. Benign prostatic hyperplasia with weak urinary stream N40.1 600.01 Followed by urology and compliant on Flomax. R39.12 788.62   
4. Tension-type headache, not intractable, unspecified chronicity pattern G44.209 339.10 Discussed that he can continue to skip dinner as it is helping prevent headaches, or he can try eating dinner earlier. 5. Weight loss R63.4 783.21 Discussed that metformin can contribute to weight loss and he should stop taking metformin as Hba1C came low. 6. B12 deficiency E53.8 266.2 He is taking OTC vitamin B12 & feeling more energetic. If he experiences a runny nose and sneezing for a prolonged period, he should take OTC allergy medication like Claritin, Allegra, or Zyrtec. This plan was reviewed with the patient and patient agrees. All questions were answered. This scribe documentation was reviewed by me and accurately reflects the examination and decisions made by me. This note will not be viewable in [address]

## 2019-01-04 DIAGNOSIS — R73.03 PREDIABETES: ICD-10-CM

## 2019-01-04 RX ORDER — PRAVASTATIN SODIUM 20 MG/1
TABLET ORAL
Qty: 90 TAB | Refills: 0 | Status: SHIPPED | OUTPATIENT
Start: 2019-01-04 | End: 2019-02-21 | Stop reason: SDUPTHER

## 2019-01-04 RX ORDER — METFORMIN HYDROCHLORIDE 500 MG/1
TABLET ORAL
Qty: 90 TAB | Refills: 0 | Status: SHIPPED | OUTPATIENT
Start: 2019-01-04 | End: 2019-02-21 | Stop reason: SDUPTHER

## 2019-01-14 DIAGNOSIS — E78.5 HYPERLIPIDEMIA, UNSPECIFIED HYPERLIPIDEMIA TYPE: Primary | ICD-10-CM

## 2019-01-14 RX ORDER — PRAVASTATIN SODIUM 20 MG/1
TABLET ORAL
Qty: 30 TAB | Refills: 2 | Status: CANCELLED | OUTPATIENT
Start: 2019-01-14

## 2019-01-14 NOTE — TELEPHONE ENCOUNTER
Last office visit 10/01/2018  Last medication refill 1/4 /2019 with confirmation receipt from pharmacy

## 2019-01-15 RX ORDER — PRAVASTATIN SODIUM 20 MG/1
TABLET ORAL
Qty: 30 TAB | Refills: 2 | Status: CANCELLED | OUTPATIENT
Start: 2019-01-15

## 2019-01-15 NOTE — TELEPHONE ENCOUNTER
Last office visit 10/01/2018  Last med refill 1/4/2019 for 90 days. Have confirmed receipt of script  Called the pharmacy and patient picked it up last week. No further action is needed.

## 2019-02-21 DIAGNOSIS — R73.03 PREDIABETES: ICD-10-CM

## 2019-02-21 RX ORDER — PRAVASTATIN SODIUM 20 MG/1
TABLET ORAL
Qty: 90 TAB | Refills: 0 | Status: SHIPPED | OUTPATIENT
Start: 2019-02-21 | End: 2019-06-20 | Stop reason: SDUPTHER

## 2019-02-21 RX ORDER — METFORMIN HYDROCHLORIDE 500 MG/1
TABLET ORAL
Qty: 90 TAB | Refills: 0 | Status: SHIPPED | OUTPATIENT
Start: 2019-02-21 | End: 2019-06-27 | Stop reason: SDUPTHER

## 2019-06-20 RX ORDER — PRAVASTATIN SODIUM 20 MG/1
TABLET ORAL
Qty: 90 TAB | Refills: 0 | Status: SHIPPED | OUTPATIENT
Start: 2019-06-20 | End: 2019-09-14 | Stop reason: SDUPTHER

## 2019-06-27 DIAGNOSIS — R73.03 PREDIABETES: ICD-10-CM

## 2019-06-28 RX ORDER — METFORMIN HYDROCHLORIDE 500 MG/1
TABLET ORAL
Qty: 90 TAB | Refills: 0 | Status: SHIPPED | OUTPATIENT
Start: 2019-06-28 | End: 2019-09-14 | Stop reason: SDUPTHER

## 2019-07-26 ENCOUNTER — OFFICE VISIT (OUTPATIENT)
Dept: PRIMARY CARE CLINIC | Age: 84
End: 2019-07-26

## 2019-07-26 VITALS
SYSTOLIC BLOOD PRESSURE: 143 MMHG | RESPIRATION RATE: 16 BRPM | TEMPERATURE: 97.7 F | BODY MASS INDEX: 23.73 KG/M2 | OXYGEN SATURATION: 99 % | DIASTOLIC BLOOD PRESSURE: 79 MMHG | HEIGHT: 69 IN | HEART RATE: 64 BPM | WEIGHT: 160.2 LBS

## 2019-07-26 DIAGNOSIS — T14.8XXA BRUISING: ICD-10-CM

## 2019-07-26 DIAGNOSIS — I10 HYPERTENSION, UNSPECIFIED TYPE: Primary | ICD-10-CM

## 2019-07-26 RX ORDER — LOSARTAN POTASSIUM 25 MG/1
25 TABLET ORAL DAILY
Qty: 30 TAB | Refills: 0 | Status: SHIPPED | OUTPATIENT
Start: 2019-07-26 | End: 2019-08-17 | Stop reason: SDUPTHER

## 2019-07-26 NOTE — PROGRESS NOTES
Visit Vitals  /79 (BP 1 Location: Right arm, BP Patient Position: Sitting)   Pulse 64   Temp 97.7 °F (36.5 °C) (Oral)   Resp 16   Ht 5' 9\" (1.753 m)   Wt 160 lb 3.2 oz (72.7 kg)   SpO2 99%   BMI 23.66 kg/m²             Chief Complaint   Patient presents with    Hand Problem     Noted discoloration of the L hand, pinky finger, denies injury, states started off size of antwon, has enlarged, denies pain            Patient is aware of HM Due, states he will schedule MWV after today's visit. Is not interested in [address] [place] [address] [facility] at this time. 1. Have you been to the ER, urgent care clinic since your last visit? Hospitalized since your last visit? Denies     2. Have you seen or consulted any other health care providers outside of the [address] [place] [address] since your last visit? Include any pap smears or colon screening.  Denies

## 2019-07-26 NOTE — PROGRESS NOTES
[facility]   [address] [facility]  P: [phone]  F: [phone]      Chief Complaint   Patient presents with    Hand Problem     Noted discoloration of the L hand, pinky finger, denies injury, states started off size of antwon, has enlarged, denies pain        Meg Snellen is a 80 y.o. male who presents to clinic for Hand Problem (Noted discoloration of the L hand, pinky finger, denies injury, states started off size of antwon, has enlarged, denies pain ). HPI:    Olive is an 54-year-old male who presents today for left hand bruising x1 day. He denies any known injury or pain but is currently taking low-dose ASA 81 mg daily. He also has a small bruise to his right hand that is the size of a dime. He is worried today that his blood pressure has been running on the high side with his SBP in the 140s. He took 1 of his wife's losartan 25 mg tablet yesterday, without side effect. He is interested in starting a medication for his blood pressure daily. Most recent lab panel was in September 2018 with creatinine of 1.03 and GFR of 66. He denies any known kidney issues. Patient Active Problem List    Diagnosis    Benign prostatic hyperplasia with weak urinary stream    Headache(784.0)    Hyperlipidemia    Coronary atherosclerosis of native coronary artery          Past Medical History:   Diagnosis Date    BPH (benign prostatic hyperplasia)     Hyperlipemia     Hypertension     RA (rheumatoid arthritis) ([facility] [address]      No past surgical history on file.   Social History     Socioeconomic History    Marital status:      Spouse name: Not on file    Number of children: Not on file    Years of education: Not on file    Highest education level: Not on file   Occupational History    Not on file   Social Needs    Financial resource strain: Not on file    Food insecurity:     Worry: Not on file     Inability: Not on file   [employer] needs: Medical: Not on file     Non-medical: Not on file   Tobacco Use    Smoking status: Former Smoker     Packs/day: 1.00     Years: 15.00     Pack years: 15.00     Types: Cigarettes     Last attempt to quit: 3/19/1975     Years since quittin.3    Smokeless tobacco: Never Used   Substance and Sexual Activity    Alcohol use: No    Drug use: No    Sexual activity: Yes     Partners: Female   Lifestyle    Physical activity:     Days per week: Not on file     Minutes per session: Not on file    Stress: Not on file   Relationships    Social connections:     Talks on phone: Not on file     Gets together: Not on file     Attends Shinto service: Not on file     Active member of club or organization: Not on file     Attends meetings of clubs or organizations: Not on file     Relationship status: Not on file    Intimate partner violence:     Fear of current or ex partner: Not on file     Emotionally abused: Not on file     Physically abused: Not on file     Forced sexual activity: Not on file   Other Topics Concern    Not on file   Social History Narrative    Not on file     Family History   Family history unknown: Yes     No Known Allergies    Current Outpatient Medications   Medication Sig Dispense Refill    losartan (COZAAR) 25 mg tablet Take 1 Tab by mouth daily. 30 Tab 0    metFORMIN (GLUCOPHAGE) 500 mg tablet TAKE 1 TABLET BY MOUTH EVERY DAY WITH BREAKFAST 90 Tab 0    pravastatin (PRAVACHOL) 20 mg tablet TAKE 1 TABLET BY MOUTH EVERYDAY AT BEDTIME 90 Tab 0    benzonatate (TESSALON) 200 mg capsule TAKE 1 CAP BY MOUTH THREE (3) TIMES DAILY AS NEEDED FOR COUGH FOR UP TO 7 DAYS. 21 Cap 0    finasteride (PROSCAR) 5 mg tablet TAKE 1 TABLET BY MOUTH EVERY DAY  99    tamsulosin (FLOMAX) 0.4 mg capsule Take 1 Cap by mouth daily.  30 Cap 0    metFORMIN (GLUCOPHAGE) 500 mg tablet TAKE 1 TABLET BY MOUTH DAILY WITH BREAKFAST 90 Tab 0    pravastatin (PRAVACHOL) 20 mg tablet TAKE 1 TABLET BY MOUTH AT BEDTIME 30 Tab 2    LOTEMAX 0.5 % oint   0    VITAMIN D3 2,000 unit cap       aspirin delayed-release (ASPIR-LOW) 81 mg tablet Take 81 mg by mouth daily. The medications were reviewed and updated in the medical record. The past medical history, past surgical history, and family history were reviewed and updated in the medical record. REVIEW OF SYSTEMS   Review of Systems   Constitutional: Negative for fever and malaise/fatigue. HENT: Negative for congestion. Eyes: Negative for blurred vision and pain. Respiratory: Negative for cough and shortness of breath. Cardiovascular: Negative for chest pain and palpitations. Gastrointestinal: Negative for abdominal pain and heartburn. Genitourinary: Negative for frequency and urgency. Musculoskeletal: Negative for joint pain and myalgias. Neurological: Negative for dizziness, tingling, sensory change, weakness and headaches. Endo/Heme/Allergies: Bruises/bleeds easily. Psychiatric/Behavioral: Negative for depression, memory loss and substance abuse. PHYSICAL EXAM     Visit Vitals  /79 (BP 1 Location: Right arm, BP Patient Position: Sitting)   Pulse 64   Temp 97.7 °F (36.5 °C) (Oral)   Resp 16   Ht 5' 9\" (1.753 m)   Wt 160 lb 3.2 oz (72.7 kg)   SpO2 99%   BMI 23.66 kg/m²       Physical Exam   Constitutional: He is oriented to person, place, and time and well-developed, well-nourished, and in no distress. Elevated    HENT:   Head: Normocephalic and atraumatic. Right Ear: External ear normal.   Left Ear: External ear normal.   Cardiovascular: Normal rate, regular rhythm and normal heart sounds. Pulmonary/Chest: Effort normal and breath sounds normal.   Musculoskeletal: Normal range of motion. He exhibits no edema. Neurological: He is alert and oriented to person, place, and time. Gait normal.   Skin: Skin is warm and dry. Ecchymosis noted. Ecchymosis to the left lateral aspect of hand.   Appears superficial.  A second small ecchymosis to right hand as well. Psychiatric: Affect and judgment normal.   Nursing note and vitals reviewed. ASSESSMENT/ PLAN   Diagnoses and all orders for this visit:    1. Hypertension, unspecified type  -   Start losartan (COZAAR) 25 mg tablet; Take 1 Tab by mouth daily.  -Encouraged follow-up visit for 2 weeks for repeat lab work. -Encourage low-salt/DASH diet. 2. Bruising       -Stop ASA for 2 weeks. -Return to office if bruising is increasing in size. Disclaimer:  Advised patient to call back or return to office if symptoms worsen/change/persist.  Discussed expected course/resolution/complications of diagnosis in detail with patient.     Medication risks/benefits/alternatives discussed with patient. Patient was given an after visit summary which includes diagnoses, current medications, & vitals.      Discussed patient instructions and advised to read to all patient instructions regarding care.      Patient expressed understanding with the diagnosis and plan. This note will not be viewable in [address]         Donato Rios NP  7/26/2019        (This document has been electronically signed)

## 2019-07-29 ENCOUNTER — TELEPHONE (OUTPATIENT)
Dept: PRIMARY CARE CLINIC | Age: 84
End: 2019-07-29

## 2019-07-30 DIAGNOSIS — R73.09 ELEVATED GLUCOSE: ICD-10-CM

## 2019-07-30 DIAGNOSIS — E78.2 ELEVATED CHOLESTEROL WITH HIGH TRIGLYCERIDES: ICD-10-CM

## 2019-07-30 DIAGNOSIS — Z00.00 MEDICARE ANNUAL WELLNESS VISIT, SUBSEQUENT: Primary | ICD-10-CM

## 2019-07-31 LAB
ALBUMIN SERPL-MCNC: 4.4 G/DL (ref 3.5–4.7)
ALBUMIN/GLOB SERPL: 1.5 {RATIO} (ref 1.2–2.2)
ALP SERPL-CCNC: 74 IU/L (ref 39–117)
ALT SERPL-CCNC: 21 IU/L (ref 0–44)
AST SERPL-CCNC: 27 IU/L (ref 0–40)
BILIRUB SERPL-MCNC: 0.4 MG/DL (ref 0–1.2)
BUN SERPL-MCNC: 16 MG/DL (ref 8–27)
BUN/CREAT SERPL: 14 (ref 10–24)
CALCIUM SERPL-MCNC: 9.9 MG/DL (ref 8.6–10.2)
CHLORIDE SERPL-SCNC: 99 MMOL/L (ref 96–106)
CHOLEST SERPL-MCNC: 135 MG/DL (ref 100–199)
CO2 SERPL-SCNC: 23 MMOL/L (ref 20–29)
CREAT SERPL-MCNC: 1.17 MG/DL (ref 0.76–1.27)
ERYTHROCYTE [DISTWIDTH] IN BLOOD BY AUTOMATED COUNT: 15 % (ref 12.3–15.4)
EST. AVERAGE GLUCOSE BLD GHB EST-MCNC: 120 MG/DL
GLOBULIN SER CALC-MCNC: 3 G/DL (ref 1.5–4.5)
GLUCOSE SERPL-MCNC: 111 MG/DL (ref 65–99)
HBA1C MFR BLD: 5.8 % (ref 4.8–5.6)
HCT VFR BLD AUTO: 42.6 % (ref 37.5–51)
HDLC SERPL-MCNC: 46 MG/DL
HGB BLD-MCNC: 14.2 G/DL (ref 13–17.7)
LDLC SERPL CALC-MCNC: 71 MG/DL (ref 0–99)
MCH RBC QN AUTO: 30.2 PG (ref 26.6–33)
MCHC RBC AUTO-ENTMCNC: 33.3 G/DL (ref 31.5–35.7)
MCV RBC AUTO: 91 FL (ref 79–97)
PLATELET # BLD AUTO: 207 X10E3/UL (ref 150–450)
POTASSIUM SERPL-SCNC: 4.7 MMOL/L (ref 3.5–5.2)
PROT SERPL-MCNC: 7.4 G/DL (ref 6–8.5)
RBC # BLD AUTO: 4.7 X10E6/UL (ref 4.14–5.8)
SODIUM SERPL-SCNC: 138 MMOL/L (ref 134–144)
TRIGL SERPL-MCNC: 88 MG/DL (ref 0–149)
TSH SERPL DL<=0.005 MIU/L-ACNC: 1.72 UIU/ML (ref 0.45–4.5)
VLDLC SERPL CALC-MCNC: 18 MG/DL (ref 5–40)
WBC # BLD AUTO: 6.8 X10E3/UL (ref 3.4–10.8)

## 2019-08-09 ENCOUNTER — OFFICE VISIT (OUTPATIENT)
Dept: PRIMARY CARE CLINIC | Age: 84
End: 2019-08-09

## 2019-08-09 VITALS
DIASTOLIC BLOOD PRESSURE: 65 MMHG | OXYGEN SATURATION: 98 % | RESPIRATION RATE: 16 BRPM | TEMPERATURE: 98.4 F | HEIGHT: 69 IN | HEART RATE: 59 BPM | BODY MASS INDEX: 23.64 KG/M2 | WEIGHT: 159.6 LBS | SYSTOLIC BLOOD PRESSURE: 121 MMHG

## 2019-08-09 DIAGNOSIS — E78.2 MIXED HYPERLIPIDEMIA: ICD-10-CM

## 2019-08-09 DIAGNOSIS — Z71.89 ACP (ADVANCE CARE PLANNING): ICD-10-CM

## 2019-08-09 DIAGNOSIS — E11.9 WELL CONTROLLED DIABETES MELLITUS (HCC): ICD-10-CM

## 2019-08-09 DIAGNOSIS — Z23 NEED FOR VACCINATION FOR STREP PNEUMONIAE: ICD-10-CM

## 2019-08-09 DIAGNOSIS — Z00.00 MEDICARE ANNUAL WELLNESS VISIT, SUBSEQUENT: Primary | ICD-10-CM

## 2019-08-09 DIAGNOSIS — N40.1 BENIGN PROSTATIC HYPERPLASIA WITH WEAK URINARY STREAM: ICD-10-CM

## 2019-08-09 DIAGNOSIS — R39.12 BENIGN PROSTATIC HYPERPLASIA WITH WEAK URINARY STREAM: ICD-10-CM

## 2019-08-09 DIAGNOSIS — I10 ESSENTIAL HYPERTENSION: ICD-10-CM

## 2019-08-09 NOTE — PROGRESS NOTES
Jason Chaudhry is a 80 y.o. male and presents for Annual Medicare Wellness Visit. Assessment of cognitive impairment: Alert and oriented x 3. Depression Screen:   3 most recent PHQ Screens 8/9/2019   Little interest or pleasure in doing things Not at all   Feeling down, depressed, irritable, or hopeless Not at all   Total Score PHQ 2 0       Fall Risk Assessment:    Fall Risk Assessment, last 12 mths 8/9/2019   Able to walk? Yes   Fall in past 12 months? No   Fall with injury? -   Number of falls in past 12 months -   Fall Risk Score -       Abuse Screen:   Abuse Screening Questionnaire 8/9/2019   Do you ever feel afraid of your partner? N   Are you in a relationship with someone who physically or mentally threatens you? N   Is it safe for you to go home? Y       Activities of Daily Living:  Self-care. Requires assistance with: no ADLs  Patient handle his/her own medications  yes Use of pill box  yes  Activities of Daily Living:   ADL Assessment 8/9/2019   Feeding yourself No Help Needed   Getting from bed to chair No Help Needed   Getting dressed No Help Needed   Bathing or showering No Help Needed   Walk across the room (includes cane/walker) No Help Needed   Using the telphone No Help Needed   Taking your medications No Help Needed   Preparing meals No Help Needed   Managing money (expenses/bills) No Help Needed   Moderately strenuous housework (laundry) No Help Needed   Shopping for personal items (toiletries/medicines) No Help Needed   Shopping for groceries No Help Needed   Driving No Help Needed   Climbing a flight of stairs No Help Needed   Getting to places beyond walking distances No Help Needed       Health Maintenance:  Daily Aspirin: yes  Bone Density: N/A  Glaucoma Screening: yes- Patient states he has appointment next week   Immunizations:    Tetanus: patient declines. Influenza: patient declines. Shingles: information given to patient. PPSV-23: first vaccine given today. Kapil Ayala Prevnar-13: not up to date - Patient declines . Cancer screening:    Cervical: N/A. Breast: N/A. Colon: up to date 11/04/2014 Prostate:  up to date- 02/04/2019    Alcohol Risk Screen:   On any occasion during the past 3 months, have you had more than 3 drinks(female) or 4 drinks (male) containing alcohol in one? No  Do you average more than 7 drinks (female) or 14 drinks (male) per week? No  Type and amount:Patient denies drinking alcohol     Hearing Loss:  Hearing is good. denies any hearing loss    Vision Loss:   Wears glasses, contact lenses, or have any other visual impairment  yes    Adult Nutrition Screen:  No risk factors noted. Advance Care Planning:   End of Life Planning: has NO advanced directive  - add't info requested. Referral to : yes  Ariel Parker ACP-Facilitator appointment yes      Medications/Allergies: Reviewed with patient  Prior to Admission medications    Medication Sig Start Date End Date Taking? Authorizing Provider   losartan (COZAAR) 25 mg tablet Take 1 Tab by mouth daily. 7/26/19  Yes Alycia Curry NP   finasteride (PROSCAR) 5 mg tablet TAKE 1 TABLET BY MOUTH EVERY DAY 2/1/18  Yes Provider, Historical   tamsulosin (FLOMAX) 0.4 mg capsule Take 1 Cap by mouth daily. 1/3/18  Yes Terri Real MD   metFORMIN (GLUCOPHAGE) 500 mg tablet TAKE 1 TABLET BY MOUTH DAILY WITH BREAKFAST 8/6/17  Yes Terri Real MD   pravastatin (PRAVACHOL) 20 mg tablet TAKE 1 TABLET BY MOUTH AT BEDTIME 7/3/17  Yes Yvette LANDIS NP   LOTEMAX 0.5 % oint  7/22/15  Yes Provider, Historical   VITAMIN D3 2,000 unit cap  4/5/14  Yes Provider, Historical   aspirin delayed-release (ASPIR-LOW) 81 mg tablet Take 81 mg by mouth daily.    Yes Provider, Historical   metFORMIN (GLUCOPHAGE) 500 mg tablet TAKE 1 TABLET BY MOUTH EVERY DAY WITH BREAKFAST 6/28/19   Alycia Curry NP   pravastatin (PRAVACHOL) 20 mg tablet TAKE 1 TABLET BY MOUTH EVERYDAY AT BEDTIME 6/20/19   Terri Real MD benzonatate (TESSALON) 200 mg capsule TAKE 1 CAP BY MOUTH THREE (3) TIMES DAILY AS NEEDED FOR COUGH FOR UP TO 7 DAYS. 3/15/18   Susan Macario MD       No Known Allergies    Objective:  Visit Vitals  /65 (BP 1 Location: Left arm, BP Patient Position: Sitting)   Pulse (!) 59   Temp 98.4 °F (36.9 °C) (Oral)   Resp 16   Ht 5' 9\" (1.753 m)   Wt 159 lb 9.6 oz (72.4 kg)   SpO2 98%   BMI 23.57 kg/m²    Body mass index is 23.57 kg/m². Problem List: Reviewed with patient and discussed risk factors. Patient Active Problem List   Diagnosis Code    Coronary atherosclerosis of native coronary artery I25.10    Hyperlipidemia E78.5    Headache(784.0) R51    Benign prostatic hyperplasia with weak urinary stream N40.1, R39.12       PSH: Reviewed with patient  History reviewed. No pertinent surgical history. SH: Reviewed with patient  Social History     Tobacco Use    Smoking status: Former Smoker     Packs/day: 1.00     Years: 15.00     Pack years: 15.00     Types: Cigarettes     Last attempt to quit: 3/19/1975     Years since quittin.4    Smokeless tobacco: Never Used   Substance Use Topics    Alcohol use: No    Drug use: No       FH: Reviewed with patient  Family History   Family history unknown: Yes       Current medical providers:    Patient Care Team:  Susan Macario MD as PCP - General (Internal Medicine)  Dr. Jaime Yu ( urology)   [employer]     Plan:      Diagnoses and all orders for this visit:    Medicare annual wellness visit, subsequent  Immunization & Health screening discussed with him. He declines most of the vaccine as has doubts about the efficacy. I did convenience him about pneumonia vaccine. ACP (advance care planning)  We had discussed in the past & paper was given . Referral placed again. Need for vaccination for Strep pneumoniae  Vaccine given in the office.        Health Maintenance   Topic Date Due    DTaP/Tdap/Td series (1 - Tdap) 1955    Shingrix Vaccine Age 49> (1 of 2) 1984    Pneumococcal 65+ years (1 of 2 - PCV13) 1999    MEDICARE YEARLY EXAM  2019    Influenza Age 9 to Adult  2019    GLAUCOMA SCREENING Q2Y  2019    COLONOSCOPY  2019          Urinary/ Fecal Incontinence: Patient denies either     Regular physical exercise: Patient states he walks all the time about 1-2 miles /day. Patient verbalized understanding of information presented. AVS and Medicare Part B Preventive Services Table printed and given to pt and reviewed. See table for findings under Recommendation and Scheduled. All of the patient's questions were answered. Progress Note    Name: Sandy Farnsworth Date: 2019  Ethnicity: NON-  Race: OTHER  MRN: [id number]  Age: 80 y.o.  : 1934  Sex: Male       HPI:   Valemaira LANDISRigoberto Ellison is a 80y.o. year old male who presents today for  Follow up & review labs. His HbA1C has been stable at 5.8%. He wants to know if he can stop taking Metformin. Last time when he stopped Metformin his HbA1C went up to 7.1%. He does eat sweets sometimes and has difficulty controlling carbs craving. His cholesterol has improved significantly. He is taking Pravachol daily. He was seen by Urology Dr. Aminata Zheng in  & PSA has been stable since. He is taking Flomax daily.      .  Visit Vitals  /65 (BP 1 Location: Left arm, BP Patient Position: Sitting)   Pulse (!) 59   Temp 98.4 °F (36.9 °C) (Oral)   Resp 16   Ht 5' 9\" (1.753 m)   Wt 159 lb 9.6 oz (72.4 kg)   SpO2 98%   BMI 23.57 kg/m²     Review of Systems   Constitutional: negative for fevers and sweats  Eyes: negative for visual disturbance and redness  Ears, nose, mouth, throat, and face: negative for nasal congestion, sore mouth and hoarseness  Respiratory: negative for hemoptysis, pleurisy/chest pain or wheezing  Cardiovascular: negative for chest pain, irregular heart beats  Gastrointestinal: negative for change in bowel habits, melena and diarrhea  Musculoskeletal:positive for arthralgias, negative for stiff joints and neck pain  Neurological:  negative for dizziness and seizures  Behavioral/Psych: negative for aggressive behavior and behavior problems    Physical Examination     General:  Alert, cooperative, no distress, appears stated age. Head:  Normocephalic, without obvious abnormality, atraumatic. Eyes:  Conjunctivae/corneas clear. PERRL, EOMs intact. Ears:  Normal TMs and external ear canals both ears. Nose: Nares normal. Septum midline. Mucosa normal. No drainage or sinus tenderness. Throat: Lips, mucosa, and tongue normal. Teeth and gums normal.   Neck: Supple, symmetrical, trachea midline, no adenopathy, thyroid: no enlargement/tenderness/nodules, no carotid bruit and no JVD. Back:   Symmetric, no curvature. ROM normal. No CVA tenderness. Lungs:   Clear to auscultation bilaterally. Heart:  Regular rate and rhythm, S1, S2 normal, no murmur       Abdomen:   Soft, non-tender. Bowel sounds normal. No masses,             Extremities: Extremities normal, atraumatic, no cyanosis or edema. Pulses: 2+ and symmetric all extremities. Skin: Skin color, texture, turgor normal. No rashes or lesions. Lymph nodes: Cervical, supraclavicular, and axillary nodes normal.   Neurologic: CNII-XII intact. Normal strength, sensation and reflexes throughout. Assessment/Plan     Diagnoses and all orders for this visit:    Essential hypertension  Well controlled on Losartan. Labs reviewed. Mixed hyperlipidemia  Lipid profile improved. Continue Pravachol. Benign prostatic hyperplasia with weak urinary stream  On flomax. Followed by Urology. Well controlled diabetes mellitus ([address]  Continue Metformin . Eye exam by AUSTIN Arboleda MD  8/17/2019  9:09 PM

## 2019-08-09 NOTE — PROGRESS NOTES
After obtaining consent, and per orders of Dr. Dorys Hooper, injection of PPSV-23 0.5mL L Deltoid was given by Prince Rios LPN. Patient instructed to remain in clinic for 20 minutes afterwards, and to report any adverse reaction to me immediately. Patient tolerated well, no complaints voice. See immunization record for vaccine details.

## 2019-08-09 NOTE — ACP (ADVANCE CARE PLANNING)
====Garry Maciel Invitation====    Patient was invited to [facility] on this date and given the information folder for review. Recommended appointment with Garry Maciel facilitator for ACP conversation regarding advance directives. [x] Yes  [] No  Referral sent to [facility] Choices team member or Coordinator for follow-up    [] Yes  [x] No  Patient scheduled an appointment.        Site of Referral: [facility]

## 2019-08-17 DIAGNOSIS — I10 HYPERTENSION, UNSPECIFIED TYPE: ICD-10-CM

## 2019-08-17 PROBLEM — E11.9 WELL CONTROLLED DIABETES MELLITUS (HCC): Status: ACTIVE | Noted: 2019-08-17

## 2019-08-18 RX ORDER — LOSARTAN POTASSIUM 25 MG/1
TABLET ORAL
Qty: 30 TAB | Refills: 0 | Status: SHIPPED | OUTPATIENT
Start: 2019-08-18 | End: 2020-01-28 | Stop reason: SDUPTHER

## 2019-09-14 DIAGNOSIS — R73.03 PREDIABETES: ICD-10-CM

## 2019-09-14 RX ORDER — PRAVASTATIN SODIUM 20 MG/1
TABLET ORAL
Qty: 90 TAB | Refills: 0 | Status: SHIPPED | OUTPATIENT
Start: 2019-09-14 | End: 2019-12-29

## 2019-09-16 RX ORDER — METFORMIN HYDROCHLORIDE 500 MG/1
TABLET ORAL
Qty: 90 TAB | Refills: 0 | Status: SHIPPED | OUTPATIENT
Start: 2019-09-16 | End: 2020-01-27

## 2019-12-29 RX ORDER — PRAVASTATIN SODIUM 20 MG/1
TABLET ORAL
Qty: 90 TAB | Refills: 0 | Status: SHIPPED | OUTPATIENT
Start: 2019-12-29 | End: 2020-01-22

## 2020-01-22 RX ORDER — PRAVASTATIN SODIUM 20 MG/1
TABLET ORAL
Qty: 90 TAB | Refills: 0 | Status: SHIPPED | OUTPATIENT
Start: 2020-01-22 | End: 2021-03-19 | Stop reason: SDUPTHER

## 2020-01-27 DIAGNOSIS — R73.03 PREDIABETES: ICD-10-CM

## 2020-01-27 RX ORDER — METFORMIN HYDROCHLORIDE 500 MG/1
TABLET ORAL
Qty: 90 TAB | Refills: 0 | Status: SHIPPED | OUTPATIENT
Start: 2020-01-27 | End: 2020-05-25

## 2020-01-28 DIAGNOSIS — I10 HYPERTENSION, UNSPECIFIED TYPE: ICD-10-CM

## 2020-01-29 RX ORDER — LOSARTAN POTASSIUM 25 MG/1
TABLET ORAL
Qty: 30 TAB | Refills: 0 | Status: SHIPPED | OUTPATIENT
Start: 2020-01-29 | End: 2020-03-14

## 2020-01-29 NOTE — TELEPHONE ENCOUNTER
LOV: 08/09/2019  Labs: 07/30/2019  Refill: 08/18/2019  Next Appt: TBD  Note to Pharmacy: Please advise patient that an appt is needed prior to any additional refills. Please have him call the office to schedule.

## 2020-01-30 ENCOUNTER — OFFICE VISIT (OUTPATIENT)
Dept: PRIMARY CARE CLINIC | Age: 85
End: 2020-01-30

## 2020-01-30 VITALS
HEIGHT: 69 IN | HEART RATE: 69 BPM | WEIGHT: 160 LBS | SYSTOLIC BLOOD PRESSURE: 137 MMHG | DIASTOLIC BLOOD PRESSURE: 79 MMHG | RESPIRATION RATE: 16 BRPM | OXYGEN SATURATION: 98 % | BODY MASS INDEX: 23.7 KG/M2 | TEMPERATURE: 97.9 F

## 2020-01-30 DIAGNOSIS — M79.604 PAIN OF RIGHT LOWER EXTREMITY: ICD-10-CM

## 2020-01-30 DIAGNOSIS — R10.31 RIGHT LOWER QUADRANT ABDOMINAL PAIN: Primary | ICD-10-CM

## 2020-01-30 DIAGNOSIS — M48.061 NEUROFORAMINAL STENOSIS OF LUMBAR SPINE: ICD-10-CM

## 2020-01-30 DIAGNOSIS — I10 ESSENTIAL HYPERTENSION: ICD-10-CM

## 2020-01-30 DIAGNOSIS — R10.31 RIGHT LOWER QUADRANT ABDOMINAL PAIN: ICD-10-CM

## 2020-01-30 NOTE — PROGRESS NOTES
Written by Brian Tyler, as dictated by Dr. Aleksey Blunt MD.    Karina Vega is a 80 y.o. male. HPI  The patient presents today c/o RLQ abdominal pain x 45 days. He states it is very painful, and is not sure what is causing it. The pain is constant, but does not worsen with palpation. The pain also worsens when moving, walking or carrying anything. The pain radiate down to his R leg some times. His bowel movements are regular. Denies nausea or change in appetite. He also reports R hip pain, which is especially painful when he walks. He wonders if Pravachol may be causing the pain. The pain may move from one place to another from intermittently. He does not have any back pain at this time. BP in office looks good. He admits he has not taken Losartan 25 mg consistently, as he ran out a few times. Compliant on Metformin 500 mg. 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 30 Cap 0    metFORMIN (GLUCOPHAGE) 500 mg tablet TAKE 1 TABLET BY MOUTH DAILY WITH BREAKFAST 90 Tab 0    pravastatin (PRAVACHOL) 20 mg tablet TAKE 1 TABLET BY MOUTH AT BEDTIME 30 Tab 2    aspirin delayed-release (ASPIR-LOW) 81 mg tablet Take 81 mg by mouth daily.       benzonatate (TESSALON) 200 mg capsule TAKE 1 CAP BY MOUTH THREE (3) TIMES DAILY AS NEEDED FOR COUGH FOR UP TO 7 DAYS. 21 Cap 0    LOTEMAX 0.5 % oint   0    VITAMIN D3 2,000 unit cap        No current facility-administered medications on file prior to visit. No Known Allergies    Past Medical History:   Diagnosis Date    BPH (benign prostatic hyperplasia)     Hyperlipemia     Hypertension     RA (rheumatoid arthritis) ([facility] [address]        History reviewed. No pertinent surgical history.     Family History   Family history unknown: Yes       Social History     Socioeconomic History    Marital status:      Spouse name: Not on file    Number of children: Not on file    Years of education: Not on file    Highest education level: Not on file   Occupational History    Not on file   Social Needs    Financial resource strain: Not on file    Food insecurity:     Worry: Not on file     Inability: Not on file    Transportation needs:     Medical: Not on file     Non-medical: Not on file   Tobacco Use    Smoking status: Former Smoker     Packs/day: 1.00     Years: 15.00     Pack years: 15.00     Types: Cigarettes     Last attempt to quit: 3/19/1975     Years since quittin.8    Smokeless tobacco: Never Used   Substance and Sexual Activity    Alcohol use: No    Drug use: No    Sexual activity: Yes     Partners: Female   Lifestyle    Physical activity:     Days per week: Not on file     Minutes per session: Not on file    Stress: Not on file   Relationships    Social connections:     Talks on phone: Not on file     Gets together: Not on file     Attends Voodoo service: Not on file     Active member of club or organization: Not on file     Attends meetings of clubs or organizations: Not on file     Relationship status: Not on file    Intimate partner violence:     Fear of current or ex partner: Not on file     Emotionally abused: Not on file     Physically abused: Not on file     Forced sexual activity: Not on file   Other Topics Concern    Not on file   Social History Narrative    Not on file       Review of Systems   Constitutional: Negative for malaise/fatigue and weight loss. HENT: Negative for congestion and hearing loss. Eyes: Negative for blurred vision and photophobia. Respiratory: Negative for cough and shortness of breath. Cardiovascular: Negative for chest pain and leg swelling. Gastrointestinal: Positive for abdominal pain (RLQ). Negative for constipation, diarrhea, heartburn and nausea. Genitourinary: Negative for dysuria, frequency and urgency. Musculoskeletal: Positive for joint pain (R hip). Negative for myalgias. Neurological: Negative for dizziness and headaches. Psychiatric/Behavioral: Negative for depression and substance abuse. The patient is not nervous/anxious and does not have insomnia. Visit Vitals  /79 (BP 1 Location: Left arm, BP Patient Position: Sitting)   Pulse 69   Temp 97.9 °F (36.6 °C) (Oral)   Resp 16   Ht 5' 9\" (1.753 m)   Wt 160 lb (72.6 kg)   SpO2 98%   BMI 23.63 kg/m²       Physical Exam  Vitals signs and nursing note reviewed. Constitutional:       General: He is not in acute distress. Appearance: Normal appearance. He is well-developed, well-groomed and normal weight. He is not diaphoretic. HENT:      Head: Normocephalic and atraumatic. Eyes:      General:         Right eye: No discharge. Left eye: No discharge. Conjunctiva/sclera: Conjunctivae normal.      Pupils: Pupils are equal, round, and reactive to light. Neck:      Musculoskeletal: Normal range of motion and neck supple. Cardiovascular:      Rate and Rhythm: Normal rate and regular rhythm. Heart sounds: Normal heart sounds. No murmur. No friction rub. No gallop. Pulmonary:      Effort: Pulmonary effort is normal.      Breath sounds: Normal breath sounds. No wheezing. Abdominal:      General: Bowel sounds are normal. There is no distension. Palpations: Abdomen is soft. Tenderness: There is no rebound. Comments: R lower quadrant tenderness. Musculoskeletal: Normal range of motion. Neurological:      Mental Status: He is alert and oriented to person, place, and time. Deep Tendon Reflexes: Reflexes are normal and symmetric. Psychiatric:         Behavior: Behavior normal.         Thought Content: Thought content normal.         ASSESSMENT and PLAN    ICD-10-CM ICD-9-CM    1. Right lower quadrant abdominal pain R10.31 789.03 CT ABD PELV W WO CONT      CBC W/O DIFF      METABOLIC PANEL, COMPREHENSIVE    CBC and CMP ordered. CT ABD PELV ordered. 2. Pain of right lower extremity M79.604 729.5 CT ABD PELV W WO CONT    CT ABD PELV ordered. Discussed his current pain may be related to his stenosis. 3. 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 No treatment necessary at this time. Pt does not report pain and is stable. 4. Essential hypertension I10 401.9 BP is well-controlled on current medication. No change to dosage at this time. This plan was reviewed with the patient and patient agrees. All questions were answered. This scribe documentation was reviewed by me and accurately reflects the examination and decisions made by me. This note will not be viewable in [address]

## 2020-01-30 NOTE — PROGRESS NOTES
Chief Complaint   Patient presents with    Abdominal Pain     x 45 days, also c/o right hip pain         1. Have you been to the ER, urgent care clinic since your last visit? Hospitalized since your last visit? no    2. Have you seen or consulted any other health care providers outside of the [address] [place] [address] since your last visit? Include any pap smears or colon screening.   no

## 2020-01-31 ENCOUNTER — HOSPITAL ENCOUNTER (OUTPATIENT)
Dept: CT IMAGING | Age: 85
Discharge: HOME | End: 2020-01-31
Attending: INTERNAL MEDICINE
Payer: MEDICARE

## 2020-01-31 DIAGNOSIS — M79.604 PAIN OF RIGHT LOWER EXTREMITY: ICD-10-CM

## 2020-01-31 DIAGNOSIS — R10.31 RIGHT LOWER QUADRANT ABDOMINAL PAIN: ICD-10-CM

## 2020-01-31 LAB
ALBUMIN SERPL-MCNC: 3.9 G/DL (ref 3.6–4.6)
ALBUMIN/GLOB SERPL: 1.3 {RATIO} (ref 1.2–2.2)
ALP SERPL-CCNC: 76 IU/L (ref 39–117)
ALT SERPL-CCNC: 13 IU/L (ref 0–44)
AST SERPL-CCNC: 19 IU/L (ref 0–40)
BILIRUB SERPL-MCNC: 0.4 MG/DL (ref 0–1.2)
BUN SERPL-MCNC: 19 MG/DL (ref 8–27)
BUN/CREAT SERPL: 19 (ref 10–24)
CALCIUM SERPL-MCNC: 9.3 MG/DL (ref 8.6–10.2)
CHLORIDE SERPL-SCNC: 102 MMOL/L (ref 96–106)
CO2 SERPL-SCNC: 23 MMOL/L (ref 20–29)
CREAT SERPL-MCNC: 1 MG/DL (ref 0.76–1.27)
ERYTHROCYTE [DISTWIDTH] IN BLOOD BY AUTOMATED COUNT: 13.4 % (ref 11.6–15.4)
GLOBULIN SER CALC-MCNC: 3 G/DL (ref 1.5–4.5)
GLUCOSE SERPL-MCNC: 94 MG/DL (ref 65–99)
HCT VFR BLD AUTO: 40.7 % (ref 37.5–51)
HGB BLD-MCNC: 14.1 G/DL (ref 13–17.7)
MCH RBC QN AUTO: 31.3 PG (ref 26.6–33)
MCHC RBC AUTO-ENTMCNC: 34.6 G/DL (ref 31.5–35.7)
MCV RBC AUTO: 90 FL (ref 79–97)
PLATELET # BLD AUTO: 190 X10E3/UL (ref 150–450)
POTASSIUM SERPL-SCNC: 4.2 MMOL/L (ref 3.5–5.2)
PROT SERPL-MCNC: 6.9 G/DL (ref 6–8.5)
RBC # BLD AUTO: 4.51 X10E6/UL (ref 4.14–5.8)
SODIUM SERPL-SCNC: 139 MMOL/L (ref 134–144)
WBC # BLD AUTO: 6.8 X10E3/UL (ref 3.4–10.8)

## 2020-01-31 PROCEDURE — 74011636320 HC RX REV CODE- 636/320: Performed by: INTERNAL MEDICINE

## 2020-01-31 PROCEDURE — 74011250636 HC RX REV CODE- 250/636: Performed by: INTERNAL MEDICINE

## 2020-01-31 PROCEDURE — 74011000255 HC RX REV CODE- 255: Performed by: INTERNAL MEDICINE

## 2020-01-31 PROCEDURE — 74177 CT ABD & PELVIS W/CONTRAST: CPT

## 2020-01-31 RX ORDER — SODIUM CHLORIDE 0.9 % (FLUSH) 0.9 %
10 SYRINGE (ML) INJECTION
Status: COMPLETED | OUTPATIENT
Start: 2020-01-31 | End: 2020-01-31

## 2020-01-31 RX ORDER — BARIUM SULFATE 20 MG/ML
900 SUSPENSION ORAL
Status: COMPLETED | OUTPATIENT
Start: 2020-01-31 | End: 2020-01-31

## 2020-01-31 RX ORDER — SODIUM CHLORIDE 9 MG/ML
50 INJECTION, SOLUTION INTRAVENOUS
Status: COMPLETED | OUTPATIENT
Start: 2020-01-31 | End: 2020-01-31

## 2020-01-31 RX ADMIN — Medication 10 ML: at 13:29

## 2020-01-31 RX ADMIN — SODIUM CHLORIDE 50 ML/HR: 900 INJECTION, SOLUTION INTRAVENOUS at 13:29

## 2020-01-31 RX ADMIN — IOPAMIDOL 100 ML: 755 INJECTION, SOLUTION INTRAVENOUS at 13:29

## 2020-01-31 RX ADMIN — BARIUM SULFATE 900 ML: 20 SUSPENSION ORAL at 13:29

## 2020-02-02 NOTE — PROGRESS NOTES
Please let him know CT abdomen report came back fine. His blood work looks fine as well. His leg problem is due to the back problem ,I would suggest seeing a neurosurgeon again.

## 2020-02-10 ENCOUNTER — TELEPHONE (OUTPATIENT)
Dept: PRIMARY CARE CLINIC | Age: 85
End: 2020-02-10

## 2020-02-10 DIAGNOSIS — M48.062 SPINAL STENOSIS OF LUMBAR REGION WITH NEUROGENIC CLAUDICATION: Primary | ICD-10-CM

## 2020-02-10 DIAGNOSIS — M79.604 PAIN OF RIGHT LOWER EXTREMITY: ICD-10-CM

## 2020-02-10 NOTE — TELEPHONE ENCOUNTER
Pt called and stated that he is waiting on Referral to see a Neurologist for the pain in his R Leg. Stated that he called last week and still has not heard anything back, he would like a call back.

## 2020-02-10 NOTE — TELEPHONE ENCOUNTER
Patient is calling again regarding  telling him to see a neurologist. I advised him that with his specific insurance he doesn't need a referral. I told him that he should still see a neurologist but he doesn't have to wait for a specific one. Would like a call back. He's having a hard time understanding and wants to know who  recommends.

## 2020-02-10 NOTE — TELEPHONE ENCOUNTER
Call returned. I left a message on his voice mail to make an appointment with Neurosurgeon. Referral placed.

## 2020-02-11 ENCOUNTER — TELEPHONE (OUTPATIENT)
Dept: PRIMARY CARE CLINIC | Age: 85
End: 2020-02-11

## 2020-02-11 NOTE — TELEPHONE ENCOUNTER
----- Message from Charyl Standard sent at 2/10/2020  7:02 PM EST -----  Regarding: Dr Shereen Navarro  Patient return call    Caller's first and last name and relationship (if not the patient):      Best contact number(s): [phone]      Whose call is being returned: not sure      Details to clarify the request: possibly regarding scheduling appt       Charyl Standard

## 2020-02-12 ENCOUNTER — OFFICE VISIT (OUTPATIENT)
Dept: PRIMARY CARE CLINIC | Age: 85
End: 2020-02-12

## 2020-02-12 VITALS
TEMPERATURE: 98 F | BODY MASS INDEX: 23.7 KG/M2 | WEIGHT: 160 LBS | HEART RATE: 58 BPM | HEIGHT: 69 IN | OXYGEN SATURATION: 96 % | SYSTOLIC BLOOD PRESSURE: 129 MMHG | RESPIRATION RATE: 16 BRPM | DIASTOLIC BLOOD PRESSURE: 73 MMHG

## 2020-02-12 DIAGNOSIS — M48.061 NEUROFORAMINAL STENOSIS OF LUMBAR SPINE: Primary | ICD-10-CM

## 2020-02-12 DIAGNOSIS — E11.9 WELL CONTROLLED DIABETES MELLITUS (HCC): ICD-10-CM

## 2020-02-12 RX ORDER — PREDNISONE 20 MG/1
TABLET ORAL
Qty: 9 TAB | Refills: 0 | Status: SHIPPED | OUTPATIENT
Start: 2020-02-12 | End: 2020-04-10 | Stop reason: SDUPTHER

## 2020-02-12 RX ORDER — CYCLOBENZAPRINE HCL 10 MG
10 TABLET ORAL
Qty: 10 TAB | Refills: 0 | Status: SHIPPED | OUTPATIENT
Start: 2020-02-12 | End: 2020-05-05 | Stop reason: SDUPTHER

## 2020-02-12 NOTE — PROGRESS NOTES
Chief Complaint   Patient presents with    Referral Follow Up         1. Have you been to the ER, urgent care clinic since your last visit? Hospitalized since your last visit? no    2. Have you seen or consulted any other health care providers outside of the [address] [place] [address] since your last visit? Include any pap smears or colon screening.   no

## 2020-02-12 NOTE — TELEPHONE ENCOUNTER
Ibuprofen 800 refill requested for the leg pain to be sent to the [facility] dr. Xiomara Greer a call back regarding that.

## 2020-02-12 NOTE — TELEPHONE ENCOUNTER
Patient presented to the office today and refused to leave scheduled with Dr. Juanita Dobson for 3:45 PM. He can address issues at that time. End of encounter.

## 2020-02-12 NOTE — PROGRESS NOTES
Written by Demetrio Aguilar, as dictated by Dr. Ehsan Bazzi MD.    Chaz Saenz is a 80 y.o. male. HPI  The patient presents today for follow-up. Since his last visit, he has called the Neurosurgeon to schedule for an appointment, and was told \"before he could see the doctor, they need to have the records for his case. \" MRI Lumbar Spine taken on 01/29/18 showed \"Progression of degenerative disc disease and degenerative changes, especially at L3-L4 and L4-L5. Severe spinal canal stenosis is now noted at L2-L3 and L3-L4 and moderate to severe stenosis is present at L4-L5. Neuroforaminal narrowing is also progressed, now severe on the left L3-L4 and moderate to severe on the left at L4-L5. \" He notes that Ibuprofen in the past has helped. He requests a medication for the back pain, as he states \"it is terrible. \"     He notes having a little bit of pain with urination. He did not get the flu shot this season. 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  30 Cap 0    metFORMIN (GLUCOPHAGE) 500 mg tablet TAKE 1 TABLET BY MOUTH DAILY WITH BREAKFAST 90 Tab 0    pravastatin (PRAVACHOL) 20 mg tablet TAKE 1 TABLET BY MOUTH AT BEDTIME 30 Tab 2    LOTEMAX 0.5 % oint   0  VITAMIN D3 2,000 unit cap       aspirin delayed-release (ASPIR-LOW) 81 mg tablet Take 81 mg by mouth daily.  benzonatate (TESSALON) 200 mg capsule TAKE 1 CAP BY MOUTH THREE (3) TIMES DAILY AS NEEDED FOR COUGH FOR UP TO 7 DAYS. 21 Cap 0     No current facility-administered medications on file prior to visit. No Known Allergies    Past Medical History:   Diagnosis Date    BPH (benign prostatic hyperplasia)     Hyperlipemia     Hypertension     RA (rheumatoid arthritis) ([facility] [address]        History reviewed. No pertinent surgical history.     Family History   Family history unknown: Yes       Social History     Socioeconomic History    Marital status:      Spouse name: Not on file    Number of children: Not on file    Years of education: Not on file    Highest education level: Not on file   Occupational History    Not on file   Social Needs    Financial resource strain: Not on file    Food insecurity:     Worry: Not on file     Inability: Not on file    Transportation needs:     Medical: Not on file     Non-medical: Not on file   Tobacco Use    Smoking status: Former Smoker     Packs/day: 1.00     Years: 15.00     Pack years: 15.00     Types: Cigarettes     Last attempt to quit: 3/19/1975     Years since quittin.9    Smokeless tobacco: Never Used   Substance and Sexual Activity    Alcohol use: No    Drug use: No    Sexual activity: Yes     Partners: Female   Lifestyle    Physical activity:     Days per week: Not on file     Minutes per session: Not on file    Stress: Not on file   Relationships    Social connections:     Talks on phone: Not on file     Gets together: Not on file     Attends [employer] service: Not on file     Active member of club or organization: Not on file     Attends meetings of clubs or organizations: Not on file     Relationship status: Not on file    Intimate partner violence:     Fear of current or ex partner: Not on file     Emotionally abused: Not on file     Physically abused: Not on file     Forced sexual activity: Not on file   Other Topics Concern    Not on file   Social History Narrative    Not on file       Orders Only on 01/30/2020   Component Date Value Ref Range Status    WBC 01/30/2020 6.8  3.4 - 10.8 x10E3/uL Final    RBC 01/30/2020 4.51  4.14 - 5.80 x10E6/uL Final    HGB 01/30/2020 14.1  13.0 - 17.7 g/dL Final    HCT 01/30/2020 40.7  37.5 - 51.0 % Final    MCV 01/30/2020 90  79 - 97 fL Final    MCH 01/30/2020 31.3  26.6 - 33.0 pg Final    MCHC 01/30/2020 34.6  31.5 - 35.7 g/dL Final    RDW 01/30/2020 13.4  11.6 - 15.4 % Final    PLATELET 83/14/5078 671  150 - 450 x10E3/uL Final    Glucose 01/30/2020 94  65 - 99 mg/dL Final    BUN 01/30/2020 19  8 - 27 mg/dL Final    Creatinine 01/30/2020 1.00  0.76 - 1.27 mg/dL Final    GFR est non-AA 01/30/2020 68  >59 mL/min/1.73 Final    GFR est AA 01/30/2020 79  >59 mL/min/1.73 Final    BUN/Creatinine ratio 01/30/2020 19  10 - 24 Final    Sodium 01/30/2020 139  134 - 144 mmol/L Final    Potassium 01/30/2020 4.2  3.5 - 5.2 mmol/L Final    Chloride 01/30/2020 102  96 - 106 mmol/L Final    CO2 01/30/2020 23  20 - 29 mmol/L Final    Calcium 01/30/2020 9.3  8.6 - 10.2 mg/dL Final    Protein, total 01/30/2020 6.9  6.0 - 8.5 g/dL Final    Albumin 01/30/2020 3.9  3.6 - 4.6 g/dL Final                  **Please note reference interval change**    GLOBULIN, TOTAL 01/30/2020 3.0  1.5 - 4.5 g/dL Final    A-G Ratio 01/30/2020 1.3  1.2 - 2.2 Final    Bilirubin, total 01/30/2020 0.4  0.0 - 1.2 mg/dL Final    Alk. phosphatase 01/30/2020 76  39 - 117 IU/L Final    AST (SGOT) 01/30/2020 19  0 - 40 IU/L Final    ALT (SGPT) 01/30/2020 13  0 - 44 IU/L Final       Review of Systems   Respiratory: Negative for cough and shortness of breath. Musculoskeletal: Positive for back pain. Negative for joint pain and myalgias. Neurological: Negative for dizziness and headaches.    Psychiatric/Behavioral: Negative for depression and substance abuse. The patient is not nervous/anxious and does not have insomnia. Visit Vitals  /73 (BP 1 Location: Left arm, BP Patient Position: Sitting)   Pulse (!) 58   Temp 98 °F (36.7 °C)   Resp 16   Ht 5' 9\" (1.753 m)   Wt 160 lb (72.6 kg)   SpO2 96%   BMI 23.63 kg/m²       Physical Exam  Vitals signs and nursing note reviewed. Constitutional:       General: He is not in acute distress. Appearance: Normal appearance. He is well-developed, well-groomed and normal weight. He is not diaphoretic. HENT:      Head: Normocephalic and atraumatic. Right Ear: External ear normal.      Left Ear: External ear normal.   Eyes:      General:         Right eye: No discharge. Left eye: No discharge. Conjunctiva/sclera: Conjunctivae normal.      Pupils: Pupils are equal, round, and reactive to light. Neck:      Musculoskeletal: Normal range of motion and neck supple. Cardiovascular:      Rate and Rhythm: Normal rate and regular rhythm. Heart sounds: Normal heart sounds. No murmur. No friction rub. No gallop. Pulmonary:      Effort: Pulmonary effort is normal.      Breath sounds: Normal breath sounds. No wheezing. Abdominal:      General: Bowel sounds are normal.      Palpations: Abdomen is soft. Tenderness: There is no abdominal tenderness. Musculoskeletal: Normal range of motion. Neurological:      Mental Status: He is alert and oriented to person, place, and time. Deep Tendon Reflexes: Reflexes are normal and symmetric. Psychiatric:         Behavior: Behavior normal.         Thought Content: Thought content normal.         ASSESSMENT and PLAN    ICD-10-CM ICD-9-CM    1. 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 cyclobenzaprine (FLEXERIL) 10 mg tablet sent to pharmacy. Prednisone 20 mg prescribed. Potential side effects were discussed.  I want the patient to take the medication po as follows: 3 pills x 1 day, 2 pills x 2 days, 1 pill x 2 days and 1/2 pill x 1 day. The patient was advised to take this medication with food. Flexeril 10 mg prescribed. Potential side effects were discussed. Pt should take 1 tab nightly. Discussed patient should make an appointment with neurosurgery for his back pain. 2. Well controlled diabetes mellitus ([facility] [address] E11.9 250.00 Reviewed and decided to continue present medications. This plan was reviewed with the patient and patient agrees. All questions were answered. This scribe documentation was reviewed by me and accurately reflects the examination and decisions made by me. This note will not be viewable in [address]

## 2020-02-17 NOTE — PROGRESS NOTES
[facility]O OT NOTE    Therapy Evaluation: OT Triaged In due to decline in functional independence.     Pt seen on 11T nursing unit.                                                          Frequency Comments: M-F     Admitting complaint:: Lumbar stenosis with neurogenic claudication [M48.062]        ASSESSMENT:     Patient presents to occupational therapy on POD 1 s/p lumbar laminectomy and fusion.  Patient is demonstrating decreased range of motion, decreased strength, balance deficits, pain, decreased activity tolerance, decreased endurance which is limiting the completion of all ADLs and all functional mobility.  Treatment today focused on Role of OT, POC, sit to stand transfers, sponge bathing, dressing and grooming from chair, laminectomy precautions and use of AE for LE cares.   Current overall ADL status is minimal assist  Current functional mobility for ADL and Instrumental-ADL tasks is supervision to stand from chair.  Further skilled occupational therapy is required to address these limitations in attempt to maximize the patient's independence.    Patient's ambulation tolerance is 0 feet    Precautions  Back Brace: Yes;When out of bed (02/17/20 0940)  Lumbar Precautions: Yes (02/17/20 0940)  Precautions Comments: s/p L2-3 laminectomy/fusion. Pt with \"light duty\" lami precautions. (02/17/20 0940)     Recommendations for Discharge: OT WI: Home  Recommendation for Discharge: PT WI: Home       PT/OT Mobility Equipment for Discharge: reports having walker (02/17/20 0829)  PT/OT ADL Equipment for Discharge: Suggested pt to purchase long handled sponge and sock aide. Pt has a reacher, shower chair and raised toilet seat (02/17/20 0940)       Task Modification: clinical decision making of low complexity, no task modification    RN reported Gregory Fall Scale Score: 45         Co-morbidities:    Patient Active Problem List   Diagnosis   • Fatigue   • History of anemia   • Long term current use of non-steroidal  anti-inflammatories (NSAID)   • Arthritic-like pain   • Osteoarthritis of both knees   • Chronic back pain   • Cramp of both lower extremities   • PUD (peptic ulcer disease)   • Benign essential HTN   • Blood in stool   • Duodenal ulcer hemorrhage   • Gastroesophageal reflux disease without esophagitis   • Hiatal hernia   • Bilateral chronic knee pain   • Chronic left shoulder pain   • Pain medication agreement broken   • At risk for abuse of opiates   • Pain medication agreement   • Lumbar degenerative disc disease   • Lumbar stenosis with neurogenic claudication   • Spondylolisthesis at L4-L5 level   • Right hip pain                                                                                    SUBJECTIVE: Patient's Personal Goal: return home (02/17/20 0940)   Subjective: Pt is agreeable to therapy session. Pt with some pain, not rated on numeric scale during session. Pt is very talkative and needs redirecting (02/17/20 0940)  Subjective/Objective Comments: RNAna'd therapy session and was present in room for start of session. Pt's back brace is not here yet. All cares completed in chair. (02/17/20 0940)    OBJECTIVE:Basic Lines: IV(PCA) (02/17/20 0940)  Safety Measures: Alarms(chair) (02/17/20 0940)    PLAN: Continue skilled OT, including the following Treatment Interventions: ADL retraining;Functional transfer training;Endurance training (02/17/20 0940)   Treatment Plan for Next Session: full cares at sink, dressing using AE (pants, shoes), brace          Assistance needed when returning home:   Discussed with patient/caregiver who acknowledged understanding of current recommendations for safe home discharge plan. Based on current level of assistance, recommendations are: home. Help to be provided by wife.    EDUCATION:   On this date, the patient was educated on role of OT, POC, laminectomy precautions and use of AE for LE cares.    The response to education was: Verbalizes understanding, Demonstrates  understanding and Needs reinforcement    Last 24 hours of Functional Data     ADLs  Self Cares/ADL's  Grooming Assistance: Set-up;Clean-up;Chair (02/17/20 0940)  Oral Hygiene Assistance: Set-up;Clean-up;Chair (02/17/20 0940)  Grooming/Oral Hygiene Deficit: Wash/dry hands;Wash/dry face;Teeth care (02/17/20 0940)  Bathing Assistance: Set-up;Clean-up;Chair (02/17/20 0940)  Bathing Deficit: Setup (02/17/20 0940)  Upper Body Dressing Assistance: Minimal Assist (Min)(to don/doff gown around IV, tie) (02/17/20 0940)  Upper Body Dressing Deficit: Thread RUE;Thread LUE;Pull around back;Fasteners (02/17/20 0940)  Lower Body Clothing Assistance: Set-up;Clean-up;Chair (02/17/20 0940)  Footwear Assistance: Set-up (02/17/20 0940)  Lower Body Dressing Deficit: Don/doff R sock;Don/doff L sock;Use of adaptive equipment (02/17/20 0940)  Dressing Equipment Used: Reacher;Sock aid (02/17/20 0940)  Self Cares/ADL's Comments #1: Pt completed sponge bathing to knees seated in chair with set-up/clean-up of supplies, min assist to don/doff gown around IV and tie. Pt educated on use of long handled sponge for reach to feet and back for bathing and use of reacher and sock aide for LE dressing to don/doff socks this session. (02/17/20 0940)    Household mobility  Household Mobility  Sit to Stand: Supervision (02/17/20 0940)  Stand to Sit: Supervision (02/17/20 0940)  Sitting - Static: Independent (02/17/20 0940)  Sitting - Dynamic: Independent (02/17/20 0940)  Standing - Static: Supervision (02/17/20 0940)  Standing - Dynamic: Supervision (02/17/20 0940)  Household Mobility Comments #1: Pt stood from recliner with supervision for safety in order to change gown.  (02/17/20 0940)    Home Management       Other Interventions         Tolerance  OT Activity Tolerance  Activity Tolerance: 1:1 Activity to rest (02/17/20 0940)  Vital Signs: stable (02/17/20 0940)  Activity Tolerance Comments: for cares in chair (02/17/20  0940)    Cognition  Communication/Cognition  Communication: Clear speech (02/17/20 0940)  Overall Cognitive Status: Within Functional Limits (02/17/20 0940)  Arousal/Alertness: Appropriate responses to stimuli (02/17/20 0940)  Attention: Appears intact (02/17/20 0940)  Memory: Appears intact (02/17/20 0940)  Following Verbal Directions: Follows all directions without difficulty (02/17/20 0940)  Following Demonstrated Directions: Follows all directions without difficulty (02/17/20 0940)  Safety Judgement: Good awareness of safety precautions (02/17/20 0940)  Awareness of Deficits: Fully aware of deficits (02/17/20 0940)    Patient's Personal Goal: return home (02/17/20 0940)    Therapy Goals:    Goals  Short Term Goals to Be Reviewed On: 02/24/20 (02/17/20 0940)  Short Term Goals Are The Same as Discharge Goals: Yes (02/17/20 0940)  Goal Agreement: Patient agrees with goals and treatment plan (02/17/20 0940)  Grooming Discharge Goal 1: Pt to complete 2 grooming tasks standing at sink with modified independence (02/17/20 0940)  Bathing Discharge Goal 1: Pt to complete full body bathing with modified independence (02/17/20 0940)  Dressing Discharge Goal 1: Pt to complete full body dressing with modified independence (02/17/20 0940)  Toileting Discharge Goal 1: Pt to toilet self with modified independence (02/17/20 0940)  Home Setting Transfer Discharge Goal 1: Pt to complete all necessary functional trasnfers with modified independence (02/17/20 0940)  Home Management Discharge Goal 1: Pt to complete item retrieval and transport of objects with modified independence (02/17/20 0940)        Total Treatment Time:  OT Time Spent: 65 minutes (02/17/20 0940)    See OT flowsheet for full details regarding the OT therapy provided.

## 2020-04-10 DIAGNOSIS — M48.061 NEUROFORAMINAL STENOSIS OF LUMBAR SPINE: ICD-10-CM

## 2020-04-10 RX ORDER — PREDNISONE 20 MG/1
TABLET ORAL
Qty: 9 TAB | Refills: 0 | Status: SHIPPED | OUTPATIENT
Start: 2020-04-10 | End: 2020-06-09 | Stop reason: SDUPTHER

## 2020-04-24 DIAGNOSIS — E78.2 MIXED HYPERLIPIDEMIA: Primary | ICD-10-CM

## 2020-04-24 RX ORDER — PRAVASTATIN SODIUM 20 MG/1
TABLET ORAL
Qty: 90 TAB | Refills: 0 | Status: SHIPPED | OUTPATIENT
Start: 2020-04-24 | End: 2020-09-22

## 2020-05-05 DIAGNOSIS — M48.061 NEUROFORAMINAL STENOSIS OF LUMBAR SPINE: ICD-10-CM

## 2020-05-05 RX ORDER — CYCLOBENZAPRINE HCL 10 MG
10 TABLET ORAL
Qty: 15 TAB | Refills: 0 | Status: SHIPPED | OUTPATIENT
Start: 2020-05-05 | End: 2020-05-20

## 2020-05-05 NOTE — TELEPHONE ENCOUNTER
----- Message from Torri Brewer sent at 5/5/2020 12:12 PM EDT -----  Regarding: Jesenia Dowd/refill  Patient called to request a refill for his \"Cyclobenzaprine\" be sent to the [facility] pharmacy on file.  Best contact number is [phone]

## 2020-05-24 DIAGNOSIS — R73.03 PREDIABETES: ICD-10-CM

## 2020-05-25 RX ORDER — METFORMIN HYDROCHLORIDE 500 MG/1
TABLET ORAL
Qty: 90 TAB | Refills: 0 | Status: SHIPPED | OUTPATIENT
Start: 2020-05-25 | End: 2020-09-23

## 2020-06-09 DIAGNOSIS — M48.061 NEUROFORAMINAL STENOSIS OF LUMBAR SPINE: ICD-10-CM

## 2020-06-09 RX ORDER — PREDNISONE 20 MG/1
TABLET ORAL
Qty: 9 TAB | Refills: 0 | Status: SHIPPED | OUTPATIENT
Start: 2020-06-09 | End: 2021-07-13 | Stop reason: ALTCHOICE

## 2020-06-11 DIAGNOSIS — M48.061 NEUROFORAMINAL STENOSIS OF LUMBAR SPINE: ICD-10-CM

## 2020-06-11 RX ORDER — PREDNISONE 20 MG/1
TABLET ORAL
Qty: 9 TAB | Refills: 0 | Status: CANCELLED | OUTPATIENT
Start: 2020-06-11

## 2020-06-11 NOTE — TELEPHONE ENCOUNTER
Returned call to patient. Advised that there is a rx for tapered dose of prednisone at [facility]. Explained that I do not see in his history pf 10mg prednisone daily. Offered to schedule an appointment with Dr Geoff Suggs.  Patient stated that he will take the tapered dose and will call back to schedule an appointment if he feels that he needs one

## 2020-08-03 ENCOUNTER — TELEPHONE (OUTPATIENT)
Dept: PRIMARY CARE CLINIC | Age: 85
End: 2020-08-03

## 2020-08-03 ENCOUNTER — OFFICE VISIT (OUTPATIENT)
Dept: PRIMARY CARE CLINIC | Age: 85
End: 2020-08-03
Payer: MEDICARE

## 2020-08-03 VITALS
OXYGEN SATURATION: 98 % | BODY MASS INDEX: 23.99 KG/M2 | SYSTOLIC BLOOD PRESSURE: 130 MMHG | HEART RATE: 60 BPM | HEIGHT: 69 IN | TEMPERATURE: 97.2 F | RESPIRATION RATE: 18 BRPM | WEIGHT: 162 LBS | DIASTOLIC BLOOD PRESSURE: 70 MMHG

## 2020-08-03 DIAGNOSIS — I10 ESSENTIAL HYPERTENSION: ICD-10-CM

## 2020-08-03 DIAGNOSIS — E11.9 WELL CONTROLLED DIABETES MELLITUS (HCC): ICD-10-CM

## 2020-08-03 DIAGNOSIS — M48.061 DEGENERATIVE LUMBAR SPINAL STENOSIS: Primary | ICD-10-CM

## 2020-08-03 LAB
ALBUMIN UR QL STRIP: 10 MG/L
CREATININE, URINE POC: 50 MG/DL
MICROALBUMIN/CREAT RATIO POC: <30 MG/G

## 2020-08-03 PROCEDURE — 99214 OFFICE O/P EST MOD 30 MIN: CPT | Performed by: INTERNAL MEDICINE

## 2020-08-03 PROCEDURE — 96372 THER/PROPH/DIAG INJ SC/IM: CPT | Performed by: INTERNAL MEDICINE

## 2020-08-03 PROCEDURE — 82044 UR ALBUMIN SEMIQUANTITATIVE: CPT | Performed by: INTERNAL MEDICINE

## 2020-08-03 RX ORDER — LOSARTAN POTASSIUM 25 MG/1
25 TABLET ORAL DAILY
Qty: 30 TAB | Refills: 2 | Status: SHIPPED | OUTPATIENT
Start: 2020-08-03 | End: 2020-09-21

## 2020-08-03 RX ORDER — BACLOFEN 10 MG/1
10 TABLET ORAL
Qty: 15 TAB | Refills: 0 | Status: SHIPPED | OUTPATIENT
Start: 2020-08-03 | End: 2020-08-10

## 2020-08-03 NOTE — TELEPHONE ENCOUNTER
Patient would like either a stronger pain medication or a referral for back pain and pain in right leg he has been having.

## 2020-08-03 NOTE — PROGRESS NOTES
Written by Katheren Boxer, as dictated by Dr. Katherine Matthew MD.    Susan Leo is a 80 y.o. male. HPI  Pt presents today for acute care c/o R leg pain. Previously, I had referred him to a neurosurgeon for further evaluation, but he had trouble with getting them to take his insurance, and the COVID stopped his progress. I prescribed prednisone for his pain and inflammation on 6/9/20, but the pain has come back. He has difficulty walking. He has been taking metformin and pravastatin but he has not been taking losartan. Patient Active Problem List   Diagnosis Code    Coronary atherosclerosis of native coronary artery I25.10    Hyperlipidemia E78.5    Benign prostatic hyperplasia with weak urinary stream N40.1, R39.12    Well controlled diabetes mellitus ([facility] [address] E11.9    Neuroforaminal stenosis of lumbar spine M99.83    Essential hypertension I10        Current Outpatient Medications on File Prior to Visit   Medication Sig Dispense Refill    losartan (COZAAR) 25 mg tablet TAKE 1 TABLET BY MOUTH EVERY DAY 90 Tab 0    pravastatin (PRAVACHOL) 20 mg tablet TAKE 1 TABLET BY MOUTH EVERYDAY AT BEDTIME 90 Tab 0    tamsulosin (FLOMAX) 0.4 mg capsule Take 1 Cap by mouth daily. 30 Cap 0    metFORMIN (GLUCOPHAGE) 500 mg tablet TAKE 1 TABLET BY MOUTH DAILY WITH BREAKFAST 90 Tab 0    VITAMIN D3 2,000 unit cap       aspirin delayed-release (ASPIR-LOW) 81 mg tablet Take 81 mg by mouth daily.  predniSONE (DELTASONE) 20 mg tablet Prednisone 60 mg po x 1 day,40 mg po x 2 days,20 mg po x 1 day,10 mg po x 1 day then stop. 9 Tab 0    metFORMIN (GLUCOPHAGE) 500 mg tablet TAKE 1 TABLET BY MOUTH EVERY DAY WITH BREAKFAST 90 Tab 0    pravastatin (PRAVACHOL) 20 mg tablet TAKE 1 TABLET BY MOUTH AT BEDTIME 90 Tab 0    benzonatate (TESSALON) 200 mg capsule TAKE 1 CAP BY MOUTH THREE (3) TIMES DAILY AS NEEDED FOR COUGH FOR UP TO 7 DAYS.  21 Cap 0    LOTEMAX 0.5 % oint   0     No current facility-administered medications on file prior to visit. No Known Allergies    Past Medical History:   Diagnosis Date    BPH (benign prostatic hyperplasia)     Hyperlipemia     Hypertension     RA (rheumatoid arthritis) ([facility] [address]        No past surgical history on file. Family History   Family history unknown: Yes       Social History     Socioeconomic History    Marital status:      Spouse name: Not on file    Number of children: Not on file    Years of education: Not on file    Highest education level: Not on file   Occupational History    Not on file   Social Needs    Financial resource strain: Not on file    Food insecurity     Worry: Not on file     Inability: Not on file    Transportation needs     Medical: Not on file     Non-medical: Not on file   Tobacco Use    Smoking status: Former Smoker     Packs/day: 1.00     Years: 15.00     Pack years: 15.00     Types: Cigarettes     Last attempt to quit: 3/19/1975     Years since quittin.4    Smokeless tobacco: Never Used   Substance and Sexual Activity    Alcohol use: No    Drug use: No    Sexual activity: Yes     Partners: Female   Lifestyle    Physical activity     Days per week: Not on file     Minutes per session: Not on file    Stress: Not on file   Relationships    Social connections     Talks on phone: Not on file     Gets together: Not on file     Attends [employer] service: Not on file     Active member of club or organization: Not on file     Attends meetings of clubs or organizations: Not on file     Relationship status: Not on file    Intimate partner violence     Fear of current or ex partner: Not on file     Emotionally abused: Not on file     Physically abused: Not on file     Forced sexual activity: Not on file   Other Topics Concern    Not on file   Social History Narrative    Not on file       No visits with results within 3 Month(s) from this visit.    Latest known visit with results is:   Orders Only on 01/30/2020   Component Date Value Ref Range Status    WBC 01/30/2020 6.8  3.4 - 10.8 x10E3/uL Final    RBC 01/30/2020 4.51  4.14 - 5.80 x10E6/uL Final    HGB 01/30/2020 14.1  13.0 - 17.7 g/dL Final    HCT 01/30/2020 40.7  37.5 - 51.0 % Final    MCV 01/30/2020 90  79 - 97 fL Final    MCH 01/30/2020 31.3  26.6 - 33.0 pg Final    MCHC 01/30/2020 34.6  31.5 - 35.7 g/dL Final    RDW 01/30/2020 13.4  11.6 - 15.4 % Final    PLATELET 87/14/4118 791  150 - 450 x10E3/uL Final    Glucose 01/30/2020 94  65 - 99 mg/dL Final    BUN 01/30/2020 19  8 - 27 mg/dL Final    Creatinine 01/30/2020 1.00  0.76 - 1.27 mg/dL Final    GFR est non-AA 01/30/2020 68  >59 mL/min/1.73 Final    GFR est AA 01/30/2020 79  >59 mL/min/1.73 Final    BUN/Creatinine ratio 01/30/2020 19  10 - 24 Final    Sodium 01/30/2020 139  134 - 144 mmol/L Final    Potassium 01/30/2020 4.2  3.5 - 5.2 mmol/L Final    Chloride 01/30/2020 102  96 - 106 mmol/L Final    CO2 01/30/2020 23  20 - 29 mmol/L Final    Calcium 01/30/2020 9.3  8.6 - 10.2 mg/dL Final    Protein, total 01/30/2020 6.9  6.0 - 8.5 g/dL Final    Albumin 01/30/2020 3.9  3.6 - 4.6 g/dL Final                  **Please note reference interval change**    GLOBULIN, TOTAL 01/30/2020 3.0  1.5 - 4.5 g/dL Final    A-G Ratio 01/30/2020 1.3  1.2 - 2.2 Final    Bilirubin, total 01/30/2020 0.4  0.0 - 1.2 mg/dL Final    Alk. phosphatase 01/30/2020 76  39 - 117 IU/L Final    AST (SGOT) 01/30/2020 19  0 - 40 IU/L Final    ALT (SGPT) 01/30/2020 13  0 - 44 IU/L Final     Review of Systems   Constitutional: Negative for malaise/fatigue and weight loss. HENT: Negative for congestion and sore throat. Eyes: Negative for blurred vision. Respiratory: Negative for cough and shortness of breath. Cardiovascular: Negative for chest pain and leg swelling. Gastrointestinal: Negative for constipation and heartburn. Genitourinary: Negative for frequency and urgency.    Musculoskeletal: Positive for myalgias (R leg). Negative for back pain and joint pain. Neurological: Negative for dizziness and headaches. Psychiatric/Behavioral: Negative for depression. The patient is not nervous/anxious and does not have insomnia. Visit Vitals  /70 (BP 1 Location: Left arm, BP Patient Position: Sitting)   Pulse 60   Temp 97.2 °F (36.2 °C) (Temporal)   Resp 18   Ht 5' 9\" (1.753 m)   Wt 162 lb (73.5 kg)   SpO2 98%   BMI 23.92 kg/m²     Physical Exam  Vitals signs and nursing note reviewed. Constitutional:       General: He is not in acute distress. Appearance: He is well-developed and well-groomed. He is not diaphoretic. HENT:      Right Ear: External ear normal.      Left Ear: External ear normal.   Eyes:      General: No scleral icterus. Right eye: No discharge. Left eye: No discharge. Extraocular Movements: Extraocular movements intact. Conjunctiva/sclera: Conjunctivae normal.   Neck:      Musculoskeletal: Normal range of motion and neck supple. Cardiovascular:      Rate and Rhythm: Normal rate and regular rhythm. Pulmonary:      Effort: Pulmonary effort is normal.      Breath sounds: Normal breath sounds. No wheezing. Abdominal:      General: Bowel sounds are normal.      Palpations: Abdomen is soft. Tenderness: There is no abdominal tenderness. Feet:      Comments: Diabetic foot exam:     Left: Vibratory sensation normal    Sharp/dull discrimination normal    Filament test normal sensation with micro filament   Pulse DP: 2+ (normal)   Deformities: None  Right: Vibratory sensation normal   Sharp/dull discrimination normal   Filament test normal sensation with micro filament   Pulse DP: 2+ (normal)   Deformities: None    Lymphadenopathy:      Cervical: No cervical adenopathy. Neurological:      Mental Status: He is alert and oriented to person, place, and time.    Psychiatric:         Mood and Affect: Mood and affect normal.         Behavior: Behavior normal.       ASSESSMENT and PLAN    ICD-10-CM ICD-9-CM    1. Degenerative lumbar spinal stenosis  M48.061 724.02 REFERRAL TO NEUROSURGERY    I provided a referral to neurosurgery and encouraged him to contact them soon to figure out whether they will accept his insurance. We discussed that the Solu-Medrol injection will provide temporary relief, but he will likely need surgery. methylPREDNISolone, PF, (Solu-MEDROL) 125 mg/2 mL solr      METHYLPREDNISOLONE INJECTION      ND THER/PROPH/DIAG INJECTION, SUBCUT/IM administered in office today. baclofen (LIORESAL) 10 mg tablet sent to pharmacy. Potential side effects were discussed. I prescribed baclofen to relieve his pain and promote good sleep. 2. Essential hypertension  I10 401.9 losartan (COZAAR) 25 mg tablet sent to pharmacy. I instructed him to take a half tablet of losartan every day to help protect his kidneys. 3. Well controlled diabetes mellitus ([facility] [address]  E11.9 250.00 AMB POC URINE, MICROALBUMIN, SEMIQUANT (3 RESULTS)    Urine sample obtained in office today.  DIABETES FOOT EXAM    Diabetic foot exam normal.        This plan was reviewed with the patient and patient agrees. All questions were answered. This scribe documentation was reviewed by me and accurately reflects the examination and decisions made by me. This note will not be viewable in [address]

## 2020-08-04 ENCOUNTER — TELEPHONE (OUTPATIENT)
Dept: PRIMARY CARE CLINIC | Age: 85
End: 2020-08-04

## 2020-08-04 DIAGNOSIS — M51.36 DEGENERATIVE LUMBAR DISC: Primary | ICD-10-CM

## 2020-08-10 DIAGNOSIS — M48.061 DEGENERATIVE LUMBAR SPINAL STENOSIS: ICD-10-CM

## 2020-08-10 RX ORDER — BACLOFEN 10 MG/1
TABLET ORAL
Qty: 15 TAB | Refills: 0 | Status: SHIPPED | OUTPATIENT
Start: 2020-08-10 | End: 2020-10-02 | Stop reason: SDUPTHER

## 2020-09-08 ENCOUNTER — TELEPHONE (OUTPATIENT)
Dept: PRIMARY CARE CLINIC | Age: 85
End: 2020-09-08

## 2020-09-08 NOTE — TELEPHONE ENCOUNTER
Confirmed patient id. He had the mri appointment for the 3rd of this month. It was cancelled due to the insurance not covering it. States ordering doctor did not put a reason for the MRI.   Tressas advise from [address] [place] [address]

## 2020-09-08 NOTE — TELEPHONE ENCOUNTER
Pt states that Dr. Fransisca Carl sent him to a spine specialist and to get an MRI, but he was never able to get that done and when he called them they seemed like they did not want to help him. He would like a call back from Dr. Fransisca Carl or the nurse to figure out what he should do. I tried to set up an appointment but he wanted to speak to someone instead. Please call patient back asap.

## 2020-09-15 NOTE — TELEPHONE ENCOUNTER
Call placed to neurosurgeons office to schedule an appointment.    Left voice message for  to call back

## 2020-09-18 NOTE — TELEPHONE ENCOUNTER
Call placed back to Dr Sarah Ray office and spoke with Amy NP/admin assist. She stated the patient had MRI scheduled for 9/3 and she sees that it was an ins issue however she can not see where the appropriate people followed up with the ins co for peer to peer.  She is going to check into this and call the patient and call me back

## 2020-09-21 DIAGNOSIS — I10 ESSENTIAL HYPERTENSION: ICD-10-CM

## 2020-09-21 RX ORDER — LOSARTAN POTASSIUM 25 MG/1
TABLET ORAL
Qty: 90 TAB | Refills: 0 | Status: SHIPPED | OUTPATIENT
Start: 2020-09-21 | End: 2021-07-13 | Stop reason: ALTCHOICE

## 2020-09-22 DIAGNOSIS — E78.2 MIXED HYPERLIPIDEMIA: ICD-10-CM

## 2020-09-22 RX ORDER — PRAVASTATIN SODIUM 20 MG/1
TABLET ORAL
Qty: 90 TAB | Refills: 0 | Status: SHIPPED | OUTPATIENT
Start: 2020-09-22 | End: 2020-09-29

## 2020-09-23 DIAGNOSIS — R73.03 PREDIABETES: ICD-10-CM

## 2020-09-23 RX ORDER — METFORMIN HYDROCHLORIDE 500 MG/1
TABLET ORAL
Qty: 90 TAB | Refills: 0 | Status: SHIPPED | OUTPATIENT
Start: 2020-09-23 | End: 2021-01-05

## 2020-09-29 DIAGNOSIS — E78.2 MIXED HYPERLIPIDEMIA: ICD-10-CM

## 2020-09-29 RX ORDER — PRAVASTATIN SODIUM 20 MG/1
TABLET ORAL
Qty: 90 TAB | Refills: 0 | Status: SHIPPED | OUTPATIENT
Start: 2020-09-29 | End: 2021-08-24

## 2020-10-02 DIAGNOSIS — M48.061 DEGENERATIVE LUMBAR SPINAL STENOSIS: ICD-10-CM

## 2020-10-05 RX ORDER — BACLOFEN 10 MG/1
TABLET ORAL
Qty: 15 TAB | Refills: 0 | Status: SHIPPED | OUTPATIENT
Start: 2020-10-05 | End: 2021-07-13 | Stop reason: ALTCHOICE

## 2020-10-20 ENCOUNTER — TELEPHONE (OUTPATIENT)
Dept: PRIMARY CARE CLINIC | Age: 85
End: 2020-10-20

## 2020-10-20 NOTE — TELEPHONE ENCOUNTER
Call received from Jovani Faulkner at [facility] neurosurgery. She was giving a follow-up call regarding patient's MRI. She stated that the insurance has denied it.  She is notifying the NP that ordered the MRI for a possible peer to peer and she will let us know the final outcome

## 2020-11-10 ENCOUNTER — TELEPHONE (OUTPATIENT)
Dept: PRIMARY CARE CLINIC | Age: 85
End: 2020-11-10

## 2020-11-10 NOTE — TELEPHONE ENCOUNTER
Patient was told by pharmacy he needs an order to get a covid test done. Patient needs this before Friday.

## 2020-11-12 NOTE — TELEPHONE ENCOUNTER
Returned call to patient.    Explained that he does not need an order for covid test.   Provided him with the information to [facility] on [address]

## 2020-11-18 ENCOUNTER — VIRTUAL VISIT (OUTPATIENT)
Dept: PRIMARY CARE CLINIC | Age: 85
End: 2020-11-18
Payer: MEDICAID

## 2020-11-18 DIAGNOSIS — J02.9 SORE THROAT: ICD-10-CM

## 2020-11-18 DIAGNOSIS — R42 DIZZINESS: Primary | ICD-10-CM

## 2020-11-18 PROCEDURE — 99443 PR PHYS/QHP TELEPHONE EVALUATION 21-30 MIN: CPT | Performed by: NURSE PRACTITIONER

## 2020-11-18 RX ORDER — MECLIZINE HCL 12.5 MG 12.5 MG/1
12.5 TABLET ORAL
Qty: 30 TAB | Refills: 0 | Status: SHIPPED | OUTPATIENT
Start: 2020-11-18 | End: 2021-01-11

## 2020-11-18 NOTE — PROGRESS NOTES
[facility]   [facility] [address], [address] [facility]  P: [phone]  F: [phone]    Candida Alejandra is a 80 y.o. male who is seen over telehealth for Dizziness and Sore Throat for about 1 week. He got a Covid test just because he has upcoming travel. He denies fevers or sore throat, denies any known sick or Covid exposure. He states he monitors his blood pressure at home and that has been fine. He is agreeable to trialing medicine for dizziness as this is an ongoing issue. Patient Active Problem List    Diagnosis    Neuroforaminal stenosis of lumbar spine    Essential hypertension    Well controlled diabetes mellitus ([facility]    Benign prostatic hyperplasia with weak urinary stream    Hyperlipidemia    Coronary atherosclerosis of native coronary artery          Past Medical History:   Diagnosis Date    BPH (benign prostatic hyperplasia)     Hyperlipemia     Hypertension     RA (rheumatoid arthritis) ([facility]      No past surgical history on file.   Social History     Socioeconomic History    Marital status:      Spouse name: Not on file    Number of children: Not on file    Years of education: Not on file    Highest education level: Not on file   Occupational History    Not on file   Social Needs    Financial resource strain: Not on file    Food insecurity     Worry: Not on file     Inability: Not on file    Transportation needs     Medical: Not on file     Non-medical: Not on file   Tobacco Use    Smoking status: Former Smoker     Packs/day: 1.00     Years: 15.00     Pack years: 15.00     Types: Cigarettes     Last attempt to quit: 3/19/1975     Years since quittin.7    Smokeless tobacco: Never Used   Substance and Sexual Activity    Alcohol use: No    Drug use: No    Sexual activity: Yes     Partners: Female   Lifestyle    Physical activity     Days per week: Not on file     Minutes per session: Not on file    Stress: Not on file Relationships    Social connections     Talks on phone: Not on file     Gets together: Not on file     Attends Congregational service: Not on file     Active member of club or organization: Not on file     Attends meetings of clubs or organizations: Not on file     Relationship status: Not on file    Intimate partner violence     Fear of current or ex partner: Not on file     Emotionally abused: Not on file     Physically abused: Not on file     Forced sexual activity: Not on file   Other Topics Concern    Not on file   Social History Narrative    Not on file     Family History   Family history unknown: Yes     No Known Allergies    Current Outpatient Medications   Medication Sig Dispense Refill    meclizine (ANTIVERT) 12.5 mg tablet Take 1 Tab by mouth three (3) times daily as needed for Dizziness for up to 10 days. 30 Tab 0    baclofen (LIORESAL) 10 mg tablet TAKE 1 TABLET BY MOUTH EVERY DAY AT NIGHT 15 Tab 0    pravastatin (PRAVACHOL) 20 mg tablet TAKE 1 TABLET BY MOUTH EVERYDAY AT BEDTIME 90 Tab 0    metFORMIN (GLUCOPHAGE) 500 mg tablet TAKE 1 TABLET BY MOUTH EVERY DAY WITH BREAKFAST 90 Tab 0    losartan (COZAAR) 25 mg tablet TAKE 1 TABLET BY MOUTH EVERY DAY 90 Tab 0    predniSONE (DELTASONE) 20 mg tablet Prednisone 60 mg po x 1 day,40 mg po x 2 days,20 mg po x 1 day,10 mg po x 1 day then stop. 9 Tab 0    losartan (COZAAR) 25 mg tablet TAKE 1 TABLET BY MOUTH EVERY DAY 90 Tab 0    pravastatin (PRAVACHOL) 20 mg tablet TAKE 1 TABLET BY MOUTH EVERYDAY AT BEDTIME 90 Tab 0    benzonatate (TESSALON) 200 mg capsule TAKE 1 CAP BY MOUTH THREE (3) TIMES DAILY AS NEEDED FOR COUGH FOR UP TO 7 DAYS. 21 Cap 0    tamsulosin (FLOMAX) 0.4 mg capsule Take 1 Cap by mouth daily. 30 Cap 0    metFORMIN (GLUCOPHAGE) 500 mg tablet TAKE 1 TABLET BY MOUTH DAILY WITH BREAKFAST 90 Tab 0    LOTEMAX 0.5 % oint   0    VITAMIN D3 2,000 unit cap       aspirin delayed-release (ASPIR-LOW) 81 mg tablet Take 81 mg by mouth daily. The medications were reviewed and updated in the medical record. The past medical history, past surgical history, and family history were reviewed and updated in the medical record. REVIEW OF SYSTEMS   Review of Systems   Constitutional: Negative for malaise/fatigue. HENT: Positive for sore throat. Negative for congestion. Eyes: Negative for blurred vision and pain. Respiratory: Negative for cough and shortness of breath. Cardiovascular: Negative for chest pain and palpitations. Gastrointestinal: Negative for abdominal pain and heartburn. Genitourinary: Negative for frequency and urgency. Musculoskeletal: Negative for joint pain and myalgias. Neurological: Positive for dizziness. Negative for tingling, sensory change, weakness and headaches. Psychiatric/Behavioral: Negative for depression, memory loss and substance abuse. PHYSICAL EXAM   NO VITALS WERE TAKEN FOR THIS VISIT  Telephone encounter only, no physical exam performed. ASSESSMENT/ PLAN   Diagnoses and all orders for this visit:    1. Dizziness  -     meclizine (ANTIVERT) 12.5 mg tablet; Take 1 Tab by mouth three (3) times daily as needed for Dizziness for up to 10 days. 2. Sore throat      -Supportive treatment with salt water gargle and lozenges, encouraged him to update us if his Covid test comes back positive. I was in the office while conducting this encounter. Consent:  He and/or his healthcare decision maker is aware that this patient-initiated Telehealth encounter is a billable service, with coverage as determined by his insurance carrier. He is aware that he may receive a bill and has provided verbal consent to proceed: Yes    This virtual visit was conducted telephone encounter only. -  I affirm this is a Patient Initiated Episode with an Established Patient who has not had a related appointment within my department in the past 7 days or scheduled within the next 24 hours.   Note: this encounter is not billable if this call serves to triage the patient into an appointment for the relevant concern. Total Time: minutes: 21-30 minutes. Disclaimer:  Advised patient to call back or return to office if symptoms worsen/change/persist.  Discussed expected course/resolution/complications of diagnosis in detail with patient. Medication risks/benefits/alternatives discussed with patient. Patient was given an after visit summary which includes diagnoses, current medications, & vitals. Discussed patient instructions and advised to read to all patient instructions regarding care. Patient expressed understanding with the diagnosis and plan. This note will not be viewable in [address]         Shahab Zurita NP  11/18/2020        (This document has been electronically signed)

## 2021-01-05 DIAGNOSIS — R73.03 PREDIABETES: ICD-10-CM

## 2021-01-05 RX ORDER — METFORMIN HYDROCHLORIDE 500 MG/1
TABLET ORAL
Qty: 90 TAB | Refills: 0 | Status: SHIPPED | OUTPATIENT
Start: 2021-01-05 | End: 2021-03-18

## 2021-01-10 DIAGNOSIS — R42 DIZZINESS: ICD-10-CM

## 2021-01-11 RX ORDER — MECLIZINE HCL 12.5 MG 12.5 MG/1
TABLET ORAL
Qty: 30 TAB | Refills: 0 | Status: SHIPPED | OUTPATIENT
Start: 2021-01-11

## 2021-03-18 DIAGNOSIS — R73.03 PREDIABETES: ICD-10-CM

## 2021-03-18 RX ORDER — METFORMIN HYDROCHLORIDE 500 MG/1
TABLET ORAL
Qty: 90 TAB | Refills: 0 | Status: SHIPPED | OUTPATIENT
Start: 2021-03-18 | End: 2021-06-14

## 2021-03-19 DIAGNOSIS — E78.2 MIXED HYPERLIPIDEMIA: Primary | ICD-10-CM

## 2021-03-19 RX ORDER — PRAVASTATIN SODIUM 20 MG/1
TABLET ORAL
Qty: 90 TAB | Refills: 0 | Status: SHIPPED | OUTPATIENT
Start: 2021-03-19 | End: 2021-06-14

## 2021-04-01 DIAGNOSIS — I10 HYPERTENSION, UNSPECIFIED TYPE: ICD-10-CM

## 2021-04-05 RX ORDER — LOSARTAN POTASSIUM 25 MG/1
TABLET ORAL
Qty: 90 TAB | Refills: 0 | Status: SHIPPED | OUTPATIENT
Start: 2021-04-05 | End: 2021-07-04

## 2021-04-23 ENCOUNTER — OFFICE VISIT (OUTPATIENT)
Dept: PRIMARY CARE CLINIC | Age: 86
End: 2021-04-23
Payer: MEDICARE

## 2021-04-23 VITALS
DIASTOLIC BLOOD PRESSURE: 79 MMHG | HEIGHT: 69 IN | WEIGHT: 160.6 LBS | TEMPERATURE: 97.5 F | OXYGEN SATURATION: 99 % | SYSTOLIC BLOOD PRESSURE: 143 MMHG | RESPIRATION RATE: 16 BRPM | HEART RATE: 58 BPM | BODY MASS INDEX: 23.79 KG/M2

## 2021-04-23 DIAGNOSIS — R29.898 WEAKNESS OF LEFT HIP: Primary | ICD-10-CM

## 2021-04-23 DIAGNOSIS — M25.552 HIP PAIN, ACUTE, LEFT: ICD-10-CM

## 2021-04-23 PROCEDURE — G8420 CALC BMI NORM PARAMETERS: HCPCS | Performed by: NURSE PRACTITIONER

## 2021-04-23 PROCEDURE — G8536 NO DOC ELDER MAL SCRN: HCPCS | Performed by: NURSE PRACTITIONER

## 2021-04-23 PROCEDURE — G8427 DOCREV CUR MEDS BY ELIG CLIN: HCPCS | Performed by: NURSE PRACTITIONER

## 2021-04-23 PROCEDURE — 99213 OFFICE O/P EST LOW 20 MIN: CPT | Performed by: NURSE PRACTITIONER

## 2021-04-23 PROCEDURE — G8432 DEP SCR NOT DOC, RNG: HCPCS | Performed by: NURSE PRACTITIONER

## 2021-04-23 PROCEDURE — 1101F PT FALLS ASSESS-DOCD LE1/YR: CPT | Performed by: NURSE PRACTITIONER

## 2021-04-23 RX ORDER — NAPROXEN 250 MG/1
250 TABLET ORAL 2 TIMES DAILY WITH MEALS
Qty: 10 TAB | Refills: 0 | Status: SHIPPED | OUTPATIENT
Start: 2021-04-23 | End: 2021-04-28

## 2021-04-23 NOTE — PROGRESS NOTES
Leatha Barron is a  80 y.o. male presents for visit. Chief Complaint   Patient presents with    Leg Pain     upper left pain going on for a week, hard to put pressure on it , pain started as a ten is now a five      HPI    Left hip flexor weakness for past week. Unable to left leg off table while seated. Pain is located left inner uper thigh. Has not taken any medication for his symptoms. Review of Systems   Constitutional: Negative for chills, fever and malaise/fatigue. HENT: Negative for congestion and sore throat. Respiratory: Negative for cough and shortness of breath. Cardiovascular: Negative for chest pain. Gastrointestinal: Negative for diarrhea, heartburn and nausea. Musculoskeletal: Positive for myalgias. Neurological: Negative for headaches. Past Medical History:   Diagnosis Date    BPH (benign prostatic hyperplasia)     Hyperlipemia     Hypertension     [facility]      History reviewed. No pertinent surgical history.     Social History     Socioeconomic History    Marital status:      Spouse name: Not on file    Number of children: Not on file    Years of education: Not on file    Highest education level: Not on file   Occupational History    Not on file   Social Needs    Financial resource strain: Not on file    Food insecurity     Worry: Not on file     Inability: Not on file    Transportation needs     Medical: Not on file     Non-medical: Not on file   Tobacco Use    Smoking status: Former Smoker     Packs/day: 1.00     Years: 15.00     Pack years: 15.00     Types: Cigarettes     Quit date: 3/19/1975     Years since quittin.1    Smokeless tobacco: Never Used   Substance and Sexual Activity    Alcohol use: No    Drug use: No    Sexual activity: Yes     Partners: Female   Lifestyle    Physical activity     Days per week: Not on file     Minutes per session: Not on file    Stress: Not on file   Relationships    Social connections     Talks on phone: Not on file     Gets together: Not on file     Attends Confucianism service: Not on file     Active member of club or organization: Not on file     Attends meetings of clubs or organizations: Not on file     Relationship status: Not on file    Intimate partner violence     Fear of current or ex partner: Not on file     Emotionally abused: Not on file     Physically abused: Not on file     Forced sexual activity: Not on file   Other Topics Concern    Not on file   Social History Narrative    Not on file       Family History   Family history unknown: Yes      Prior to Admission medications    Medication Sig Start Date End Date Taking? Authorizing Provider   losartan (COZAAR) 25 mg tablet TAKE 1 TABLET BY MOUTH EVERY DAY 4/5/21  Yes Nelida Devi MD   pravastatin (PRAVACHOL) 20 mg tablet TAKE 1 TABLET BY MOUTH EVERYDAY AT BEDTIME 3/19/21  Yes Nelida Devi MD   metFORMIN (GLUCOPHAGE) 500 mg tablet TAKE 1 TABLET BY MOUTH EVERY DAY WITH BREAKFAST 3/18/21  Yes Nelida Devi MD   baclofen (LIORESAL) 10 mg tablet TAKE 1 TABLET BY MOUTH EVERY DAY AT NIGHT 10/5/20  Yes Nelida Devi MD   pravastatin (PRAVACHOL) 20 mg tablet TAKE 1 TABLET BY MOUTH EVERYDAY AT BEDTIME 9/29/20  Yes Nelida Devi MD   losartan (COZAAR) 25 mg tablet TAKE 1 TABLET BY MOUTH EVERY DAY 9/21/20  Yes Nelida Devi MD   tamsulosin (FLOMAX) 0.4 mg capsule Take 1 Cap by mouth daily. 1/3/18  Yes Nelida Devi MD   metFORMIN (GLUCOPHAGE) 500 mg tablet TAKE 1 TABLET BY MOUTH DAILY WITH BREAKFAST 8/6/17  Yes Nelida Devi MD   VITAMIN D3 2,000 unit cap  4/5/14  Yes Provider, Historical   aspirin delayed-release (ASPIR-LOW) 81 mg tablet Take 81 mg by mouth daily.    Yes Provider, Historical   meclizine (ANTIVERT) 12.5 mg tablet TAKE 1 TABLET BY MOUTH THREE TIMES A DAY AS NEEDED FOR DIZZINESS FOR UP TO 10 DAYS 1/11/21   Aziza Santiago NP   predniSONE (DELTASONE) 20 mg tablet Prednisone 60 mg po x 1 day,40 mg po x 2 days,20 mg po x 1 day,10 mg po x 1 day then stop. 6/9/20   Alison Doshi MD   benzonatate (TESSALON) 200 mg capsule TAKE 1 CAP BY MOUTH THREE (3) TIMES DAILY AS NEEDED FOR COUGH FOR UP TO 7 DAYS. 3/15/18   Alison Doshi MD   LOTEMAX 0.5 % oint  7/22/15   Provider, Historical      No Known Allergies     Visit Vitals  BP (!) 143/79 (BP 1 Location: Right upper arm, BP Patient Position: Sitting, BP Cuff Size: Small adult) Comment: did not take meds this morning   Pulse (!) 58   Temp 97.5 °F (36.4 °C) (Temporal)   Resp 16   Ht 5' 9\" (1.753 m)   Wt 160 lb 9.6 oz (72.8 kg)   SpO2 99%   BMI 23.72 kg/m²     Physical Exam  Vitals signs and nursing note reviewed. Constitutional:       General: He is not in acute distress. Appearance: He is well-developed. HENT:      Head: Normocephalic and atraumatic. Right Ear: External ear normal.      Left Ear: External ear normal.      Nose: Nose normal.   Eyes:      Conjunctiva/sclera: Conjunctivae normal.   Cardiovascular:      Rate and Rhythm: Normal rate. Pulmonary:      Effort: Pulmonary effort is normal.      Breath sounds: Normal breath sounds. No wheezing. Musculoskeletal:      Left hip: He exhibits decreased range of motion and decreased strength. Skin:     General: Skin is warm and dry. Neurological:      Mental Status: He is alert and oriented to person, place, and time. Psychiatric:         Speech: Speech normal.         Behavior: Behavior normal.               No results found for this or any previous visit (from the past 24 hour(s)). Patient Active Problem List    Diagnosis Date Noted    Neuroforaminal stenosis of lumbar spine 01/30/2020    Essential hypertension 01/30/2020    Well controlled diabetes mellitus ([facility] [address] 08/17/2019    Benign prostatic hyperplasia with weak urinary stream 10/01/2018    Hyperlipidemia 02/26/2013    Coronary atherosclerosis of native coronary artery 08/30/2010     Ice and rest. Avoid triggers. Getting in care.  Etc bending, stairs    ASSESSMENT AND PLAN:      ICD-10-CM ICD-9-CM   1. Weakness of left hip  R29.898 729.89   2. Hip pain, acute, left  M25.552 719.45     Orders Placed This Encounter    US EXT NONVAS LT LTD     Standing Status:   Future     Standing Expiration Date:   4/23/2022     Order Specific Question:   Specific Body Part     Answer:   left hip flexor weakness. Order Specific Question:   Reason for Exam     Answer:   weakness and pain in proximal Left leg/groin    naproxen (NAPROSYN) 250 mg tablet     Sig: Take 1 Tab by mouth two (2) times daily (with meals) for 5 days. Dispense:  10 Tab     Refill:  0     Diagnoses and all orders for this visit:    1. Weakness of left hip  -     US EXT NONVAS LT LTD; Future    2. Hip pain, acute, left  -     naproxen (NAPROSYN) 250 mg tablet; Take 1 Tab by mouth two (2) times daily (with meals) for 5 days. radiology results and schedule of future radiology studies reviewed with patient          Follow-up and Dispositions    · Return if symptoms worsen or fail to improve, for f/u with Dr. Bucky Sorensen after US is back. .           Disclaimer:  Advised him to call back or return to office if symptoms worsen/change/persist.  Discussed expected course/resolution/complications of diagnosis in detail with patient. Medication risks/benefits/alternatives discussed with patient. He was given an after visit summary which includes diagnoses, current medications, & vitals. Discussed patient instructions and advised to read to all patient instructions regarding care. He expressed understanding with the diagnosis and plan.

## 2021-04-23 NOTE — PROGRESS NOTES
Chief Complaint   Patient presents with    Leg Pain     upper left pain going on for a week, hard to put pressure on it , pain started as a ten is now a five      1. Have you been to the ER, urgent care clinic since your last visit? Hospitalized since your last visit? No    2. Have you seen or consulted any other health care providers outside of the [address] [place] [address] since your last visit? Include any pap smears or colon screening.  No      Visit Vitals  BP (!) 143/79 (BP 1 Location: Right upper arm, BP Patient Position: Sitting, BP Cuff Size: Small adult) Comment: did not take meds this morning   Pulse (!) 58   Temp 97.5 °F (36.4 °C) (Temporal)   Resp 16   Ht 5' 9\" (1.753 m)   Wt 160 lb 9.6 oz (72.8 kg)   SpO2 99%   BMI 23.72 kg/m²

## 2021-06-13 DIAGNOSIS — R73.03 PREDIABETES: ICD-10-CM

## 2021-06-13 DIAGNOSIS — E78.2 MIXED HYPERLIPIDEMIA: ICD-10-CM

## 2021-06-14 RX ORDER — METFORMIN HYDROCHLORIDE 500 MG/1
TABLET ORAL
Qty: 90 TABLET | Refills: 0 | Status: SHIPPED | OUTPATIENT
Start: 2021-06-14 | End: 2021-07-13 | Stop reason: SDUPTHER

## 2021-06-14 RX ORDER — PRAVASTATIN SODIUM 20 MG/1
TABLET ORAL
Qty: 90 TABLET | Refills: 0 | Status: SHIPPED | OUTPATIENT
Start: 2021-06-14 | End: 2021-07-13 | Stop reason: SDUPTHER

## 2021-07-03 DIAGNOSIS — I10 HYPERTENSION, UNSPECIFIED TYPE: ICD-10-CM

## 2021-07-04 RX ORDER — LOSARTAN POTASSIUM 25 MG/1
TABLET ORAL
Qty: 90 TABLET | Refills: 0 | Status: SHIPPED | OUTPATIENT
Start: 2021-07-04

## 2021-07-13 ENCOUNTER — OFFICE VISIT (OUTPATIENT)
Dept: PRIMARY CARE CLINIC | Age: 86
End: 2021-07-13
Payer: MEDICAID

## 2021-07-13 ENCOUNTER — HOSPITAL ENCOUNTER (OUTPATIENT)
Dept: GENERAL RADIOLOGY | Age: 86
Discharge: HOME | End: 2021-07-13
Payer: MEDICARE

## 2021-07-13 VITALS
HEART RATE: 55 BPM | DIASTOLIC BLOOD PRESSURE: 66 MMHG | BODY MASS INDEX: 22.75 KG/M2 | RESPIRATION RATE: 15 BRPM | WEIGHT: 153.6 LBS | OXYGEN SATURATION: 97 % | TEMPERATURE: 97.3 F | HEIGHT: 69 IN | SYSTOLIC BLOOD PRESSURE: 118 MMHG

## 2021-07-13 DIAGNOSIS — R39.12 BENIGN PROSTATIC HYPERPLASIA WITH WEAK URINARY STREAM: ICD-10-CM

## 2021-07-13 DIAGNOSIS — Z00.00 MEDICARE ANNUAL WELLNESS VISIT, SUBSEQUENT: Primary | ICD-10-CM

## 2021-07-13 DIAGNOSIS — E78.2 MIXED HYPERLIPIDEMIA: ICD-10-CM

## 2021-07-13 DIAGNOSIS — Z71.89 ACP (ADVANCE CARE PLANNING): ICD-10-CM

## 2021-07-13 DIAGNOSIS — E11.9 WELL CONTROLLED DIABETES MELLITUS (HCC): ICD-10-CM

## 2021-07-13 DIAGNOSIS — M48.061 DEGENERATIVE LUMBAR SPINAL STENOSIS: ICD-10-CM

## 2021-07-13 DIAGNOSIS — N40.1 BENIGN PROSTATIC HYPERPLASIA WITH WEAK URINARY STREAM: ICD-10-CM

## 2021-07-13 DIAGNOSIS — M25.551 RIGHT HIP PAIN: ICD-10-CM

## 2021-07-13 DIAGNOSIS — R29.898 WEAKNESS OF BOTH LEGS: ICD-10-CM

## 2021-07-13 DIAGNOSIS — I10 ESSENTIAL HYPERTENSION: ICD-10-CM

## 2021-07-13 PROCEDURE — G0439 PPPS, SUBSEQ VISIT: HCPCS | Performed by: INTERNAL MEDICINE

## 2021-07-13 PROCEDURE — 99214 OFFICE O/P EST MOD 30 MIN: CPT | Performed by: INTERNAL MEDICINE

## 2021-07-13 PROCEDURE — 73502 X-RAY EXAM HIP UNI 2-3 VIEWS: CPT

## 2021-07-13 NOTE — PROGRESS NOTES
Chief Complaint   Patient presents with    Extremity Weakness     leg weakness and right upper hip pain       Visit Vitals  /66 (BP 1 Location: Right arm)   Pulse (!) 55   Temp 97.3 °F (36.3 °C)   Resp 15   Ht 5' 9\" (1.753 m)   Wt 153 lb 9.6 oz (69.7 kg)   SpO2 97%   BMI 22.68 kg/m²       1. Have you been to the ER, urgent care clinic since your last visit? Hospitalized since your last visit? No    2. Have you seen or consulted any other health care providers outside of the [address] [place] [address] since your last visit? Include any pap smears or colon screening. Kelly Beckett is a 80 y.o. male and presents for Annual Medicare Wellness Visit. Assessment of cognitive impairment: Alert and oriented x 3. Depression Screen:   3 most recent PHQ Screens 7/13/2021   Little interest or pleasure in doing things Not at all   Feeling down, depressed, irritable, or hopeless Not at all   Total Score PHQ 2 0       Fall Risk Assessment:    Fall Risk Assessment, last 12 mths 7/13/2021   Able to walk? Yes   Fall in past 12 months? 0   Do you feel unsteady? 0   Are you worried about falling 0   Number of falls in past 12 months -   Fall with injury? -       Abuse Screen:   Abuse Screening Questionnaire 7/13/2021   Do you ever feel afraid of your partner? N   Are you in a relationship with someone who physically or mentally threatens you? N   Is it safe for you to go home? Y       Activities of Daily Living:  Self-care.    Requires assistance with: no ADLs  Patient handle his/her own medications  yes Use of pill box  no  Activities of Daily Living:   ADL Assessment 7/13/2021   Feeding yourself No Help Needed   Getting from bed to chair No Help Needed   Getting dressed No Help Needed   Bathing or showering No Help Needed   Walk across the room (includes cane/walker) No Help Needed   Using the telphone No Help Needed   Taking your medications No Help Needed   Preparing meals No Help Needed   Managing money (expenses/bills) No Help Needed   Moderately strenuous housework (laundry) No Help Needed   Shopping for personal items (toiletries/medicines) No Help Needed   Shopping for groceries No Help Needed   Driving No Help Needed   Climbing a flight of stairs No Help Needed   Getting to places beyond walking distances No Help Needed       Health Maintenance:  Daily Aspirin: yes   Bone Density: n/a  Glaucoma Screening: not since 2019   Immunizations:    Tetanus: not up to date - . Influenza: up to date. Shingles: not up to date - . PPSV-23:  up to date - . Prevnar-13: not up to date - . COVID vaccine up to date     Cancer screening:     Colon: not up to date - . Prostate: PSA ordered. Alcohol Risk Screen:   On any occasion during the past 3 months, have you had more than 3 drinks(female) or 4 drinks (male) containing alcohol in one? No  Do you average more than 7 drinks (female) or 14 drinks (male) per week? No  Type and amount:does not drink    Hearing Loss:  The patient needs further evaluation. denies any hearing loss wears hearing aides    Vision Loss:   Wears glasses, contact lenses, or have any other visual impairment  no    Adult Nutrition Screen:  No risk factors noted. Advance Care Planning:   End of Life Planning: has NO advanced directive  - add't info requested. Referral to : yes,   Ariel Wilkerson [address] ACP-Facilitator appointment yes      Medications/Allergies: Reviewed with patient  Prior to Admission medications    Medication Sig Start Date End Date Taking?  Authorizing Provider   losartan (COZAAR) 25 mg tablet TAKE 1 TABLET BY MOUTH EVERY DAY 7/4/21  Yes Honorio Mason MD   meclizine (ANTIVERT) 12.5 mg tablet TAKE 1 TABLET BY MOUTH THREE TIMES A DAY AS NEEDED FOR DIZZINESS FOR UP TO 10 DAYS 1/11/21  Yes Osmin Mesa NP   pravastatin (PRAVACHOL) 20 mg tablet TAKE 1 TABLET BY MOUTH EVERYDAY AT BEDTIME 9/29/20  Yes Honorio Mason MD   losartan (COZAAR) 25 mg tablet TAKE 1 TABLET BY MOUTH EVERY DAY 9/21/20  Yes Chino Pierre MD   metFORMIN (GLUCOPHAGE) 500 mg tablet TAKE 1 TABLET BY MOUTH DAILY WITH BREAKFAST 8/6/17  Yes Chino Pierre MD   LOTEMAX 0.5 % oint  7/22/15  Yes Provider, Historical   VITAMIN D3 2,000 unit cap  4/5/14  Yes Provider, Historical   aspirin delayed-release (ASPIR-LOW) 81 mg tablet Take 81 mg by mouth daily. Yes Provider, Historical   metFORMIN (GLUCOPHAGE) 500 mg tablet TAKE 1 TABLET BY MOUTH EVERY DAY WITH BREAKFAST 6/14/21 7/13/21  Chino Pierre MD   pravastatin (PRAVACHOL) 20 mg tablet TAKE 1 TABLET BY MOUTH EVERYDAY AT BEDTIME 6/14/21 7/13/21  Chino Pierre MD   baclofen (LIORESAL) 10 mg tablet TAKE 1 TABLET BY MOUTH EVERY DAY AT NIGHT 10/5/20   Chino Pierre MD   predniSONE (DELTASONE) 20 mg tablet Prednisone 60 mg po x 1 day,40 mg po x 2 days,20 mg po x 1 day,10 mg po x 1 day then stop. Patient not taking: Reported on 7/13/2021 6/9/20   Chino Pierre MD   benzonatate (TESSALON) 200 mg capsule TAKE 1 CAP BY MOUTH THREE (3) TIMES DAILY AS NEEDED FOR COUGH FOR UP TO 7 DAYS. Patient not taking: Reported on 7/13/2021 3/15/18   Chino Pierre MD   tamsulosin [facility]) 0.4 mg capsule Take 1 Cap by mouth daily. 1/3/18   Chino Pierre MD       No Known Allergies    Objective:  Visit Vitals  /66 (BP 1 Location: Right arm)   Pulse (!) 55   Temp 97.3 °F (36.3 °C)   Resp 15   Ht 5' 9\" (1.753 m)   Wt 153 lb 9.6 oz (69.7 kg)   SpO2 97%   BMI 22.68 kg/m²    Body mass index is 22.68 kg/m². Problem List: Reviewed with patient and discussed risk factors. 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 No pertinent surgical history. SH: Reviewed with patient  Social History     Tobacco Use    Smoking status: Former Smoker     Packs/day: 1.00     Years: 15.00     Pack years: 15.00     Types: Cigarettes     Quit date: 3/19/1975     Years since quittin.3    Smokeless tobacco: Never Used   Vaping Use    Vaping Use: Never assessed   Substance Use Topics    Alcohol use: No    Drug use: No       FH: Reviewed with patient  Family History   Family history unknown: Yes       Current medical providers:    Patient Care Team:  Quynh Blair MD as PCP - General (Internal Medicine)  Quynh Blair MD as PCP - [facility] Provider    Plan:    Diagnoses and all orders for this visit:    Medicare annual wellness visit, subsequent  Immunization and Health screening discussed with him. ACP (advance care planning)  Refer to ACP. Orders Placed This Encounter    XR HIP RT W OR WO PELV 2-3 VWS    REFERRAL TO NEUROSURGERY     DIABETES FOOT EXAM       Health Maintenance   Topic Date Due    DTaP/Tdap/Td series (1 - Tdap) Never done    Shingrix Vaccine Age 50> (1 of 2) Never done    Eye Exam Retinal or Dilated  2019    Lipid Screen  2020    Medicare Yearly Exam  2021    MICROALBUMIN Q1  2021    Flu Vaccine (1) 2021    Foot Exam Q1  2022    COVID-19 Vaccine  Completed    Pneumococcal 65+ years  Completed          Urinary/ Fecal Incontinence: No    Regular physical exercise: Not much lately due to the back pain & leg weakness    Patient verbalized understanding of information presented. AVS and Medicare Part B Preventive Services Table printed and given to pt and reviewed. See table for findings under Recommendation and Scheduled. All of the patient's questions were answered.   lillian Boyer (: 1934) is a 80 y.o. male, established patient, here for evaluation of the following chief complaint(s):  Extremity Weakness (leg weakness and right upper hip pain)   Written by Isaak Garduno [address], as dictated by Dr. Nelson Lamar MD.      ASSESSMENT/PLAN:  Below is the assessment and plan developed based on review of pertinent history, physical exam, labs, studies, and medications. 1. Degenerative lumbar spinal stenosis  Refer to Neurosurgery. Need to consider surgery as soon as possible. Continue to take baclofen 10 mg as prescribed. -     REFERRAL TO NEUROSURGERY    2. Weakness of both legs  Refer to Neurosurgery. Secondary to degenerative lumbar spinal stenosis. -     REFERRAL TO NEUROSURGERY    3. Well controlled diabetes mellitus ([address]  Continue taking metformin 500 mg as prescribed. Ordered labs to check metabolic panel, CBC levels, A1c levels, and kidney function. Waiting for results. -      DIABETES FOOT EXAM  -     METABOLIC PANEL, COMPREHENSIVE; Future  -     CBC W/O DIFF; Future  -     HEMOGLOBIN A1C WITH EAG; Future  -     MICROALBUMIN, UR, RAND W/ MICROALB/CREAT RATIO; Future    4. Right hip pain  Ordered a Xray to evaluate R hip pain. Waiting for results. -     XR HIP RT W OR WO PELV 2-3 VWS; Future    5. Mixed hyperlipidemia  Continue taking pravastatin 20 mg as prescribed. Ordered labs to check lipid panel. Waiting for results. -     LIPID PANEL; Future    6. Benign prostatic hyperplasia with weak urinary stream  Continue taking Flomax 0.4 mg as prescribed. Followed by urology. -     PSA, DIAGNOSTIC (PROSTATE SPECIFIC AG); Future    7. Essential hypertension  Continue taking losartan 25 mg as prescribed. SUBJECTIVE/OBJECTIVE:  HPI    Patient presents today c/o of BL leg pain and stiffness that prevents him from walking sometimes. He states the L leg is more stiff, and has to lift his leg to wear pants. The pain increases when he is working or physical activity. He also notes R hip pain. He states that his sxs subside with rest. He has a hx of severe degenerative lumbar spinal stenosis, he was treated by Dr. Ozzie Hernandez in 2018.  He takes baclofen for his degenerative lumbar spinal stenosis. He notes that he feels like him and his wife cannot live independently and would like some assisted living options. He has been to an eye doctor in the past, but has not been following up consistently. He takes losartan for HTN. He takes Flomax for BPH. Patient Active Problem List   Diagnosis Code    Coronary atherosclerosis of native coronary artery I25.10    Hyperlipidemia E78.5    Benign prostatic hyperplasia with weak urinary stream N40.1, R39.12    Well controlled diabetes mellitus ([facility] [address] E11.9    Neuroforaminal stenosis of lumbar spine M48.061    Essential hypertension I10        Current Outpatient Medications on File Prior to Visit   Medication Sig Dispense Refill    losartan (COZAAR) 25 mg tablet TAKE 1 TABLET BY MOUTH EVERY DAY 90 Tablet 0    meclizine (ANTIVERT) 12.5 mg tablet TAKE 1 TABLET BY MOUTH THREE TIMES A DAY AS NEEDED FOR DIZZINESS FOR UP TO 10 DAYS 30 Tab 0    pravastatin (PRAVACHOL) 20 mg tablet TAKE 1 TABLET BY MOUTH EVERYDAY AT BEDTIME 90 Tab 0    metFORMIN (GLUCOPHAGE) 500 mg tablet TAKE 1 TABLET BY MOUTH DAILY WITH BREAKFAST 90 Tab 0    LOTEMAX 0.5 % oint   0    VITAMIN D3 2,000 unit cap       aspirin delayed-release (ASPIR-LOW) 81 mg tablet Take 81 mg by mouth daily.  [DISCONTINUED] metFORMIN (GLUCOPHAGE) 500 mg tablet TAKE 1 TABLET BY MOUTH EVERY DAY WITH BREAKFAST 90 Tablet 0    [DISCONTINUED] pravastatin (PRAVACHOL) 20 mg tablet TAKE 1 TABLET BY MOUTH EVERYDAY AT BEDTIME 90 Tablet 0    [DISCONTINUED] baclofen (LIORESAL) 10 mg tablet TAKE 1 TABLET BY MOUTH EVERY DAY AT NIGHT 15 Tab 0    [DISCONTINUED] losartan (COZAAR) 25 mg tablet TAKE 1 TABLET BY MOUTH EVERY DAY 90 Tab 0    [DISCONTINUED] predniSONE (DELTASONE) 20 mg tablet Prednisone 60 mg po x 1 day,40 mg po x 2 days,20 mg po x 1 day,10 mg po x 1 day then stop.  (Patient not taking: Reported on 7/13/2021) 9 Tab 0    [DISCONTINUED] benzonatate (TESSALON) 200 mg capsule TAKE 1 CAP BY MOUTH THREE (3) TIMES DAILY AS NEEDED FOR COUGH FOR UP TO 7 DAYS. (Patient not taking: Reported on 2021) 21 Cap 0    tamsulosin (FLOMAX) 0.4 mg capsule Take 1 Cap by mouth daily. 30 Cap 0     No current facility-administered medications on file prior to visit. No Known Allergies    Past Medical History:   Diagnosis Date    BPH (benign prostatic hyperplasia)     Hyperlipemia     Hypertension     RA (rheumatoid arthritis) [address] [address]        History reviewed. No pertinent surgical history. Family History   Family history unknown: Yes       Social History     Socioeconomic History    Marital status:      Spouse name: Not on file    Number of children: Not on file    Years of education: Not on file    Highest education level: Not on file   Occupational History    Not on file   Tobacco Use    Smoking status: Former Smoker     Packs/day: 1.00     Years: 15.00     Pack years: 15.00     Types: Cigarettes     Quit date: 3/19/1975     Years since quittin.3    Smokeless tobacco: Never Used   Vaping Use    Vaping Use: Never assessed   Substance and Sexual Activity    Alcohol use: No    Drug use: No    Sexual activity: Yes     Partners: Female   Other Topics Concern    Not on file   Social History Narrative    Not on file     Social Determinants of Health     Financial Resource Strain:     Difficulty of Paying Living Expenses:    Food Insecurity:     Worried About Running Out of Food in the Last Year:     Ran Out of Food in the Last Year:    Transportation Needs:     Lack of Transportation (Medical):      Lack of Transportation (Non-Medical):    Physical Activity:     Days of Exercise per Week:     Minutes of Exercise per Session:    Stress:     Feeling of Stress :    Social Connections:     Frequency of Communication with Friends and Family:     Frequency of Social Gatherings with Friends and Family:     Attends Scientologist Services:     Active Member of Clubs or Organizations:     Attends Club or Organization Meetings:     Marital Status:    Intimate Partner Violence:     Fear of Current or Ex-Partner:     Emotionally Abused:     Physically Abused:     Sexually Abused:        No visits with results within 3 Month(s) from this visit. Latest known visit with results is:   Office Visit on 08/03/2020   Component Date Value Ref Range Status    ALBUMIN, URINE POC 08/03/2020 10  mg/L Final    CREATININE, URINE POC 08/03/2020 50  mg/dL Final    Microalbumin/creat ratio (POC) 08/03/2020 <30  <30 MG/G Final      Review of Systems   Constitutional: Negative for activity change, fatigue and unexpected weight change. HENT: Negative for congestion, ear discharge, ear pain, hearing loss, rhinorrhea and sore throat. Eyes: Negative for pain, discharge and redness. Respiratory: Negative for cough, chest tightness and shortness of breath. Cardiovascular: Negative for leg swelling. Gastrointestinal: Negative for abdominal pain, constipation and diarrhea. Endocrine: Negative for polyuria. Genitourinary: Negative for dysuria, flank pain and urgency. Musculoskeletal: Positive for arthralgias, back pain and myalgias. Skin: Negative for color change. Neurological: Negative for dizziness, light-headedness and headaches. Psychiatric/Behavioral: Negative for dysphoric mood and sleep disturbance. The patient is not nervous/anxious. Visit Vitals  /66 (BP 1 Location: Right arm)   Pulse (!) 55   Temp 97.3 °F (36.3 °C)   Resp 15   Ht 5' 9\" (1.753 m)   Wt 153 lb 9.6 oz (69.7 kg)   SpO2 97%   BMI 22.68 kg/m²      Physical Exam  Vitals and nursing note reviewed. Constitutional:       General: He is not in acute distress. Appearance: Normal appearance. He is well-developed. He is not diaphoretic. HENT:      Head: Normocephalic and atraumatic.       Right Ear: External ear normal.      Left Ear: External ear normal.      Mouth/Throat:      Mouth: Mucous membranes are moist.      Pharynx: Oropharynx is clear. Eyes:      General: No scleral icterus. Right eye: No discharge. Left eye: No discharge. Extraocular Movements: Extraocular movements intact. Conjunctiva/sclera: Conjunctivae normal.      Pupils: Pupils are equal, round, and reactive to light. Cardiovascular:      Rate and Rhythm: Normal rate and regular rhythm. Pulses: Normal pulses. Heart sounds: Normal heart sounds. Pulmonary:      Effort: Pulmonary effort is normal.      Breath sounds: Normal breath sounds. No wheezing. Musculoskeletal:         General: Tenderness present. Right lower leg: No edema. Left lower leg: No edema. Comments: Tenderness on BL thighs. Pain during ROM on R hip. Feet:      Comments: Diabetic foot exam:     Left: Vibratory sensation decreased    Sharp/dull discrimination decreased   Filament test decreased sensation with micro filament   Pulse DP: 2+ (normal)   Deformities: None  Right: Vibratory sensation normal   Sharp/dull discrimination normal   Filament test normal sensation with micro filament   Pulse DP: 2+ (normal)   Deformities: None    Neurological:      Mental Status: He is alert and oriented to person, place, and time. Psychiatric:         Mood and Affect: Mood and affect normal.         An electronic signature was used to authenticate this note.   -- Kori Steele

## 2021-07-15 ENCOUNTER — PATIENT OUTREACH (OUTPATIENT)
Dept: CASE MANAGEMENT | Age: 86
End: 2021-07-15

## 2021-07-15 NOTE — ACP (ADVANCE CARE PLANNING)
Advance Care Planning   Ambulatory ACP Specialist Patient Outreach    Date:  7/15/2021    ACP Specialist:  Joe Corcoran LPN    Outreach call to patient in follow-up to ACP Specialist referral from:    [x] PCP  [] Provider   [] Ambulatory Care Management [] Other     For:                  [] Continued Conversation for ACP decision making / Goals of Care             [] Code Status Discussion             [x] Completion of Adv Directive             [] Completion of Portable DNR order             [] Other (Specify)    Date Referral Received:7/14/21    Today's Outreach:  [x] First   [] Second  [] Third                                           Third outreach made by [x]  phone  [] email []   Appolicioust     Intervention:  [] Spoke with Patient   [x] Left VM requesting return call      Outcome: First attempt to contact pt regarding ACP. No answer on mobile phone. VM left requesting a return   call. Will attempt 2nd outreach within one week. Next Step:   [] ACP scheduled conversation  [x] Outreach again in one week               [] Email / Mail ACP Info Sheets  [] Email / Mail Advance Directive   []  Closing referral.  Routing closure to referring provider/staff and to ACP Specialist .      Thank you for this referral.

## 2021-07-15 NOTE — PROGRESS NOTES
Let him know Hip X-ray came back fine. Please make his appointment at [facility] Neurosurgery department. Blas Sequeira was working on it. I hope he gets in soon.

## 2021-07-16 LAB
ALBUMIN SERPL-MCNC: 4.3 G/DL (ref 3.6–4.6)
ALBUMIN/GLOB SERPL: 1.5 {RATIO} (ref 1.2–2.2)
ALP SERPL-CCNC: 87 IU/L (ref 48–121)
ALT SERPL-CCNC: 18 IU/L (ref 0–44)
AST SERPL-CCNC: 22 IU/L (ref 0–40)
BILIRUB SERPL-MCNC: 0.6 MG/DL (ref 0–1.2)
BUN SERPL-MCNC: 20 MG/DL (ref 8–27)
BUN/CREAT SERPL: 18 (ref 10–24)
CALCIUM SERPL-MCNC: 9.6 MG/DL (ref 8.6–10.2)
CHLORIDE SERPL-SCNC: 103 MMOL/L (ref 96–106)
CHOLEST SERPL-MCNC: 218 MG/DL (ref 100–199)
CO2 SERPL-SCNC: 23 MMOL/L (ref 20–29)
CREAT SERPL-MCNC: 1.12 MG/DL (ref 0.76–1.27)
ERYTHROCYTE [DISTWIDTH] IN BLOOD BY AUTOMATED COUNT: 13.6 % (ref 11.6–15.4)
EST. AVERAGE GLUCOSE BLD GHB EST-MCNC: 117 MG/DL
GLOBULIN SER CALC-MCNC: 2.9 G/DL (ref 1.5–4.5)
GLUCOSE SERPL-MCNC: 107 MG/DL (ref 65–99)
HBA1C MFR BLD: 5.7 % (ref 4.8–5.6)
HCT VFR BLD AUTO: 47.2 % (ref 37.5–51)
HDLC SERPL-MCNC: 50 MG/DL
HGB BLD-MCNC: 15.7 G/DL (ref 13–17.7)
LDLC SERPL CALC-MCNC: 142 MG/DL (ref 0–99)
MCH RBC QN AUTO: 31 PG (ref 26.6–33)
MCHC RBC AUTO-ENTMCNC: 33.3 G/DL (ref 31.5–35.7)
MCV RBC AUTO: 93 FL (ref 79–97)
PLATELET # BLD AUTO: 195 X10E3/UL (ref 150–450)
POTASSIUM SERPL-SCNC: 4.6 MMOL/L (ref 3.5–5.2)
PROT SERPL-MCNC: 7.2 G/DL (ref 6–8.5)
PSA SERPL-MCNC: 1.3 NG/ML (ref 0–4)
RBC # BLD AUTO: 5.06 X10E6/UL (ref 4.14–5.8)
SODIUM SERPL-SCNC: 139 MMOL/L (ref 134–144)
TRIGL SERPL-MCNC: 147 MG/DL (ref 0–149)
VLDLC SERPL CALC-MCNC: 26 MG/DL (ref 5–40)
WBC # BLD AUTO: 7.1 X10E3/UL (ref 3.4–10.8)

## 2021-07-18 LAB
ALBUMIN/CREAT UR: <4 MG/G CREAT (ref 0–29)
CREAT UR-MCNC: 70.3 MG/DL
MICROALBUMIN UR-MCNC: <3 UG/ML
SPECIMEN STATUS REPORT, ROLRST: NORMAL

## 2021-07-19 ENCOUNTER — TELEPHONE (OUTPATIENT)
Dept: PRIMARY CARE CLINIC | Age: 86
End: 2021-07-19

## 2021-07-19 NOTE — TELEPHONE ENCOUNTER
Returned call to patient, explained that he needs to see neurosurgeon. Patient asked if the referral had been sent over because he was trying to make an appointment. Advised patient I will contact the office and fax the referral. I spoke with Dr Christina Villegas assistant received clarification that patient was seen last year via phone visit by LEONIE Mcconnell, she ordered an MRI that was denied. They offered the patient to do therapy or in office appointment. Patient was scheduled for therapy for 12/7/21 however the patient canceled the appointment. I spoke with mAy the admin assist for LEONIE Mcconnell she stated that at this point it would be best for patient to be seen in person.  Appointment scheduled for 7/28/2021 @ 140p at [place] [address], Orthopaedic and [address]   Left voice message for patient informing of this information and requesting him to return call

## 2021-07-19 NOTE — PROGRESS NOTES
Obed Figueredo spoke with patient, he has not been taking the Pravochol daily but said he would. Appointment made for [address] neurosurgery.

## 2021-07-19 NOTE — TELEPHONE ENCOUNTER
Called patient back. Spoke with him on the phone. Reviewed scheduled appointment information. Gave address, phone number and appointment date and time.

## 2021-07-19 NOTE — TELEPHONE ENCOUNTER
----- Message from Parminder Valadez sent at 7/19/2021 10:06 AM EDT -----  Regarding: /telephone  Contact: [phone]  General Message/Vendor Calls    Caller's first and last name: N/A      Reason for call: He needs some clarification regarding his appt with the neurologist. He doesn't know if he should see a neurologist or surgeon. He also needs a recommendation for an eye doctor.       Callback required yes/no and why: Yes      Best contact number(s): [phone]       Details to clarify the request: N/A      Parminder Valadez

## 2021-07-19 NOTE — PROGRESS NOTES
Let him know cholesterol numbers came back elevated. Ask him if he is taking Pravachol daily?  Also, what is going on with his [facility] referral?

## 2021-07-30 ENCOUNTER — PATIENT OUTREACH (OUTPATIENT)
Dept: CASE MANAGEMENT | Age: 86
End: 2021-07-30

## 2021-08-02 NOTE — ACP (ADVANCE CARE PLANNING)
Advance Care Planning   Ambulatory ACP Specialist Patient Outreach    Date:  7/30/2021    ACP Specialist:  Sharona Wright LPN    Outreach call to patient in follow-up to ACP Specialist referral from:    [x] PCP  [] Provider   [] Ambulatory Care Management [] Other     For:                  [x] Advance Directive Assistance              [] Complete Portable DNR order              [] Complete POST/MOST              [] Code Status Discussion             [] Discuss Goals of Care             [] Early ACP Decision-Making              [] Other (Specify)    Date Referral Received:7/14/21    Today's Outreach:  [] First   [x] Second  [] Third       Third outreach made by: [] Phone  [] Email / mail    [] Memopal     Intervention:  [] Spoke with Patient   [x] Left VM requesting return call      Outcome: Second attempt to contact pt regarding ACP. No answer on mobile phone. VM left requesting a return call. Will outreach again within one week. Next Step:   [] ACP scheduled conversation  [x] Outreach again in one week               [] Email / Mail ACP Info Sheets  [] Email / Mail Advance Directive   [] Closing referral.  Routing closure to referring provider/staff and to ACP Specialist . [] Closure letter mailed to patient with invitation to contact ACP Specialist if / when ready.   Thank you for this referral.

## 2021-08-09 ENCOUNTER — PATIENT OUTREACH (OUTPATIENT)
Dept: CASE MANAGEMENT | Age: 86
End: 2021-08-09

## 2021-08-09 NOTE — ACP (ADVANCE CARE PLANNING)
Advance Care Planning   Ambulatory ACP Specialist Patient Outreach    Date:  8/9/2021    ACP Specialist:  Mala Leon LPN    Outreach call to patient in follow-up to ACP Specialist referral from:    [x] PCP  [] Provider   [] Ambulatory Care Management [] Other     For:                  [x] Advance Directive Assistance              [] Complete Portable DNR order              [] Complete POST/MOST              [] Code Status Discussion             [] Discuss Goals of Care             [] Early ACP Decision-Making              [] Other (Specify)    Date Referral Received:7/14/21    Today's Outreach:  [] First   [] Second  [x] Third       Third outreach made by: [] Phone  [x] Email / mail    [] Runsc[employer]et     Intervention:  [] Spoke with Patient   [] Left VM requesting return call      Outcome: Final attempt to reach to pt. ACP documents sent to pt via mail. My contact information was included. Will close referral at this time       Next Step:   [x] ACP scheduled conversation  [] Outreach again in one week               [x] Email / Mail [address] [place]  [] Email / Mail Advance Directive   [x] Closing referral.  Routing closure to referring provider/staff and to ACP Specialist . [] Closure letter mailed to patient with invitation to contact ACP Specialist if / when ready.   Thank you for this referral.

## 2021-08-24 DIAGNOSIS — E78.2 MIXED HYPERLIPIDEMIA: ICD-10-CM

## 2021-08-24 RX ORDER — PRAVASTATIN SODIUM 20 MG/1
TABLET ORAL
Qty: 90 TABLET | Refills: 0 | Status: SHIPPED | OUTPATIENT
Start: 2021-08-24 | End: 2021-11-11

## 2021-10-05 DIAGNOSIS — R73.03 PREDIABETES: ICD-10-CM

## 2021-10-05 RX ORDER — METFORMIN HYDROCHLORIDE 500 MG/1
TABLET ORAL
Qty: 90 TABLET | Refills: 0 | Status: SHIPPED | OUTPATIENT
Start: 2021-10-05 | End: 2022-01-06

## 2021-10-05 NOTE — TELEPHONE ENCOUNTER
Requested Prescriptions     Pending Prescriptions Disp Refills    metFORMIN (GLUCOPHAGE) 500 mg tablet 90 Tablet 0        Last Visit 7/13/21  Last Refill 8/6/17

## 2021-11-11 DIAGNOSIS — E78.2 MIXED HYPERLIPIDEMIA: ICD-10-CM

## 2021-11-11 RX ORDER — PRAVASTATIN SODIUM 20 MG/1
TABLET ORAL
Qty: 90 TABLET | Refills: 0 | Status: SHIPPED | OUTPATIENT
Start: 2021-11-11 | End: 2021-11-15

## 2021-11-15 DIAGNOSIS — E78.2 MIXED HYPERLIPIDEMIA: ICD-10-CM

## 2021-11-15 RX ORDER — PRAVASTATIN SODIUM 20 MG/1
TABLET ORAL
Qty: 90 TABLET | Refills: 0 | Status: SHIPPED | OUTPATIENT
Start: 2021-11-15 | End: 2022-03-26

## 2021-12-09 ENCOUNTER — TELEPHONE (OUTPATIENT)
Dept: PRIMARY CARE CLINIC | Age: 86
End: 2021-12-09

## 2021-12-09 ENCOUNTER — NURSE TRIAGE (OUTPATIENT)
Dept: OTHER | Facility: CLINIC | Age: 86
End: 2021-12-09

## 2021-12-09 NOTE — TELEPHONE ENCOUNTER
Patient was transferred by [facility] earlier this morning.  Patient was told to go to an Urgent Care or ED, appointment has also been made for next week as a follow up for the headaches

## 2021-12-09 NOTE — TELEPHONE ENCOUNTER
Patient called the office with c/o head pain on left side of his head for approx one week. Explained that PCP is not in the office today and there are not any openings tomorrow. Advised that he should go to ER or urgent care.  Patient stated he would go also scheduled an appointment for 12/15/21

## 2021-12-09 NOTE — TELEPHONE ENCOUNTER
Received call from [place] at [facility] with Red Flag Complaint. Brief description of triage: Severe headache, throbbing on left side of forehead x 1 week. Triage indicates for patient to go to ED or office with PCP approval,    Care advice provided, patient verbalizes understanding; denies any other questions or concerns; instructed to call back for any new or worsening symptoms. Writer provided warm transfer to [address] [place] [address] [facility]. Reason for Disposition   SEVERE headache, states 'worst headache' of life    Answer Assessment - Initial Assessment Questions  1. LOCATION: \"Where does it hurt? \"       Left side headache    2. ONSET: \"When did the headache start? \" (Minutes, hours or days)       1 week ago    3. PATTERN: \"Does the pain come and go, or has it been constant since it started? \"      Intermittent but has become more constant    4. SEVERITY: \"How bad is the pain? \" and \"What does it keep you from doing? \"  (e.g., Scale 1-10; mild, moderate, or severe)    - MILD (1-3): doesn't interfere with normal activities     - MODERATE (4-7): interferes with normal activities or awakens from sleep     - SEVERE (8-10): excruciating pain, unable to do any normal activities         Severe 9/10     5. RECURRENT SYMPTOM: \"Have you ever had headaches before? \" If so, ask: \"When was the last time? \" and \"What happened that time? \"       Denies    6. CAUSE: \"What do you think is causing the headache? \"      Unknown    7. MIGRAINE: \"Have you been diagnosed with migraine headaches? \" If so, ask: \"Is this headache similar? \"       Denies    8. HEAD INJURY: \"Has there been any recent injury to the head? \"       Denies    9. OTHER SYMPTOMS: \"Do you have any other symptoms? \" (fever, stiff neck, eye pain, sore throat, cold symptoms)      Denies    10. PREGNANCY: \"Is there any chance you are pregnant? \" \"When was your last menstrual period? \"       NA    Protocols used: HEADACHE-ADULT-OH    Attention Provider: Thank you for allowing me to participate in the care of your patient. The patient was connected to triage in response to information provided to the ECC. Please do not respond through this encounter as the response is not directed to a shared pool.

## 2021-12-15 ENCOUNTER — OFFICE VISIT (OUTPATIENT)
Dept: PRIMARY CARE CLINIC | Age: 86
End: 2021-12-15
Payer: MEDICARE

## 2021-12-15 VITALS
HEIGHT: 69 IN | TEMPERATURE: 97.5 F | HEART RATE: 65 BPM | OXYGEN SATURATION: 100 % | WEIGHT: 155 LBS | RESPIRATION RATE: 18 BRPM | SYSTOLIC BLOOD PRESSURE: 103 MMHG | DIASTOLIC BLOOD PRESSURE: 67 MMHG | BODY MASS INDEX: 22.96 KG/M2

## 2021-12-15 DIAGNOSIS — R51.9 LEFT-SIDED HEADACHE: ICD-10-CM

## 2021-12-15 DIAGNOSIS — M48.061 DEGENERATIVE LUMBAR SPINAL STENOSIS: ICD-10-CM

## 2021-12-15 DIAGNOSIS — Z23 ENCOUNTER FOR IMMUNIZATION: ICD-10-CM

## 2021-12-15 DIAGNOSIS — Z23 NEEDS FLU SHOT: ICD-10-CM

## 2021-12-15 DIAGNOSIS — M79.604 RIGHT LEG PAIN: Primary | ICD-10-CM

## 2021-12-15 PROCEDURE — G8536 NO DOC ELDER MAL SCRN: HCPCS | Performed by: INTERNAL MEDICINE

## 2021-12-15 PROCEDURE — 1101F PT FALLS ASSESS-DOCD LE1/YR: CPT | Performed by: INTERNAL MEDICINE

## 2021-12-15 PROCEDURE — 99214 OFFICE O/P EST MOD 30 MIN: CPT | Performed by: INTERNAL MEDICINE

## 2021-12-15 PROCEDURE — G8427 DOCREV CUR MEDS BY ELIG CLIN: HCPCS | Performed by: INTERNAL MEDICINE

## 2021-12-15 PROCEDURE — G8420 CALC BMI NORM PARAMETERS: HCPCS | Performed by: INTERNAL MEDICINE

## 2021-12-15 PROCEDURE — G8510 SCR DEP NEG, NO PLAN REQD: HCPCS | Performed by: INTERNAL MEDICINE

## 2021-12-15 PROCEDURE — 90694 VACC AIIV4 NO PRSRV 0.5ML IM: CPT | Performed by: INTERNAL MEDICINE

## 2021-12-15 RX ORDER — PREDNISONE 20 MG/1
TABLET ORAL
Qty: 13 TABLET | Refills: 0 | Status: SHIPPED | OUTPATIENT
Start: 2021-12-15 | End: 2022-02-18 | Stop reason: ALTCHOICE

## 2021-12-15 NOTE — PROGRESS NOTES
Mervin Shaver (: 1934) is a 80 y.o. male, established patient, here for evaluation of the following chief complaint(s):  Head Pain (aprrox 1 month. has tried otc meds without relief)       ASSESSMENT/PLAN:  Below is the assessment and plan developed based on review of pertinent history, physical exam, labs, studies, and medications. 1. Right leg pain  I explained to him that leg pain is most likely due to her back issues. -     predniSONE (DELTASONE) 20 mg tablet; Prednisone 60 mg po x 2 days,40 mg po x 2 days,20 mg po x 2 days,10 mg po x 2 days then stop., Normal, Disp-13 Tablet, R-0  2. Degenerative lumbar spinal stenosis  Recommended making an appointment with neurosurgeon as symptoms are not resolved with physical therapy. -     predniSONE (DELTASONE) 20 mg tablet; Prednisone 60 mg po x 2 days,40 mg po x 2 days,20 mg po x 2 days,10 mg po x 2 days then stop., Normal, Disp-13 Tablet, R-0  3. Left-sided headache  I told him if headache does not improve with prednisone he needs to come back to the office.  -     predniSONE (DELTASONE) 20 mg tablet; Prednisone 60 mg po x 2 days,40 mg po x 2 days,20 mg po x 2 days,10 mg po x 2 days then stop., Normal, Disp-13 Tablet, R-0  4. Needs flu shot  He will get a flu shot today. SUBJECTIVE/OBJECTIVE:  Patient seen today with right-sided leg pain radiates from the lower back goes all the way to the right leg. He has a history of lumbar stenosis and has been doing physical therapy for that. But lately he has been having leg pains mostly on the right side now. He was told to come back if physical therapy does not work for his back pain. He has been experiencing headaches as well but today he has more left temporal headache. No visual problems and sleep has been fine. He will get a flu shot from our office today.     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 30 Cap 0    LOTEMAX 0.5 % oint   0    VITAMIN D3 2,000 unit cap       aspirin delayed-release (ASPIR-LOW) 81 mg tablet Take 81 mg by mouth daily. No current facility-administered medications on file prior to visit. No Known Allergies    Past Medical History:   Diagnosis Date    BPH (benign prostatic hyperplasia)     Hyperlipemia     Hypertension     RA (rheumatoid arthritis) ([facility]        No past surgical history on file.     Family History   Family history unknown: Yes       Social History     Socioeconomic History    Marital status:      Spouse name: Not on file    Number of children: Not on file    Years of education: Not on file    Highest education level: Not on file   Occupational History    Not on file   Tobacco Use    Smoking status: Former Smoker     Packs/day: 1.00     Years: 15.00     Pack years: 15.00     Types: Cigarettes     Quit date: 3/19/1975     Years since quittin.7    Smokeless tobacco: Never Used   Vaping Use    Vaping Use: Not on file   Substance and Sexual Activity    Alcohol use: No    Drug use: No    Sexual activity: Yes     Partners: Female   Other Topics Concern    Not on file   Social History Narrative    Not on file     Social Determinants of Health     Financial Resource Strain:     Difficulty of Paying Living Expenses: Not on file   Food Insecurity:     Worried About Running Out of Food in the Last Year: Not on file    Fred of Food in the Last Year: Not on file   Transportation Needs:     Lack of Transportation (Medical): Not on file    Lack of Transportation (Non-Medical): Not on file   Physical Activity:     Days of Exercise per Week: Not on file    Minutes of Exercise per Session: Not on file   Stress:     Feeling of Stress : Not on file   Social Connections:     Frequency of Communication with Friends and Family: Not on file    Frequency of Social Gatherings with Friends and Family: Not on file    Attends Zoroastrianism Services: Not on file    Active Member of [address] [place] [address] [employer] or Organizations: Not on file    Attends Club or Organization Meetings: Not on file    Marital Status: Not on file   Intimate Partner Violence:     Fear of Current or Ex-Partner: Not on file    Emotionally Abused: Not on file    Physically Abused: Not on file    Sexually Abused: Not on file   Housing Stability:     Unable to Pay for Housing in the Last Year: Not on file    Number of [place] in the Last Year: Not on file    Unstable Housing in the Last Year: Not on file       No visits with results within 3 Month(s) from this visit. Latest known visit with results is:   Office Visit on 07/13/2021   Component Date Value Ref Range Status    Glucose 07/15/2021 107* 65 - 99 mg/dL Final    BUN 07/15/2021 20  8 - 27 mg/dL Final    Creatinine 07/15/2021 1.12  0.76 - 1.27 mg/dL Final    GFR est non-AA 07/15/2021 59* >59 mL/min/1.73 Final    GFR est AA 07/15/2021 68  >59 mL/min/1.73 Final    Comment: **[facility] currently reports eGFR in compliance with the current**    recommendations of the [employer].  Misbah Crawford will    update reporting as new guidelines are published from the NKF-ASN    Task force.      BUN/Creatinine ratio 07/15/2021 18  10 - 24 Final    Sodium 07/15/2021 139  134 - 144 mmol/L Final    Potassium 07/15/2021 4.6  3.5 - 5.2 mmol/L Final    Chloride 07/15/2021 103  96 - 106 mmol/L Final    CO2 07/15/2021 23  20 - 29 mmol/L Final    Calcium 07/15/2021 9.6  8.6 - 10.2 mg/dL Final    Protein, total 07/15/2021 7.2  6.0 - 8.5 g/dL Final    Albumin 07/15/2021 4.3  3.6 - 4.6 g/dL Final    GLOBULIN, TOTAL 07/15/2021 2.9  1.5 - 4.5 g/dL Final    A-G Ratio 07/15/2021 1.5  1.2 - 2.2 Final    Bilirubin, total 07/15/2021 0.6  0.0 - 1.2 mg/dL Final    Alk. phosphatase 07/15/2021 87  48 - 121 IU/L Final    AST (SGOT) 07/15/2021 22  0 - 40 IU/L Final    ALT (SGPT) 07/15/2021 18  0 - 44 IU/L Final    WBC 07/15/2021 7.1  3.4 - 10.8 x10E3/uL Final    RBC 07/15/2021 5.06  4.14 - 5.80 x10E6/uL Final    HGB 07/15/2021 15.7  13.0 - 17.7 g/dL Final    HCT 07/15/2021 47.2  37.5 - 51.0 % Final    MCV 07/15/2021 93  79 - 97 fL Final    MCH 07/15/2021 31.0  26.6 - 33.0 pg Final    MCHC 07/15/2021 33.3  31.5 - 35.7 g/dL Final    RDW 07/15/2021 13.6  11.6 - 15.4 % Final    PLATELET 87/37/9457 377  150 - 450 x10E3/uL Final    Cholesterol, total 07/15/2021 218* 100 - 199 mg/dL Final    Triglyceride 07/15/2021 147  0 - 149 mg/dL Final    HDL Cholesterol 07/15/2021 50  >39 mg/dL Final    VLDL, calculated 07/15/2021 26  5 - 40 mg/dL Final    LDL, calculated 07/15/2021 142* 0 - 99 mg/dL Final    Hemoglobin A1c 07/15/2021 5.7* 4.8 - 5.6 % Final    Comment:          Prediabetes: 5.7 - 6.4           Diabetes: >6.4           Glycemic control for adults with diabetes: <7.0      Estimated average glucose 07/15/2021 117  mg/dL Final    Prostate Specific Ag 07/15/2021 1.3  0.0 - 4.0 ng/mL Final    Comment: Roche ECLIA methodology. According to the American Urological Association, Serum PSA should  decrease and remain at undetectable levels after radical  prostatectomy.  The AUA defines biochemical recurrence as an initial  PSA value 0.2 ng/mL or greater followed by a subsequent confirmatory  PSA value 0.2 ng/mL or greater. Values obtained with different assay methods or kits cannot be used  interchangeably. Results cannot be interpreted as absolute evidence  of the presence or absence of malignant disease.  Creatinine, urine 07/15/2021 70.3  Not Estab. mg/dL Final    Microalbumin, urine 07/15/2021 <3.0  Not Estab. ug/mL Final    Microalb/Creat ratio (ug/mg creat.) 07/15/2021 <4  0 - 29 mg/g creat Final    Comment:                        Normal:                0 -  29                         Moderately increased: 30 - 300                         Severely increased:       >300      SPECIMEN STATUS REPORT 07/15/2021 COMMENT   Final    Comment: Written Authorization  Written Authorization  Written Authorization Received. Authorization received from per slip 07-  Logged by Dony Phipps       Review of Systems  Visit Vitals  /67 (BP 1 Location: Right arm, BP Patient Position: Sitting)   Pulse 65   Temp 97.5 °F (36.4 °C) (Temporal)   Resp 18   Ht 5' 9\" (1.753 m)   Wt 155 lb (70.3 kg)   SpO2 100%   BMI 22.89 kg/m²       Physical Exam  Constitutional:       Appearance: Normal appearance. He is not diaphoretic. HENT:      Head: Normocephalic and atraumatic. Comments: Left temporal tenderness +  Eyes:      General:         Right eye: No discharge. Left eye: No discharge. Extraocular Movements: Extraocular movements intact. Pupils: Pupils are equal, round, and reactive to light. Pulmonary:      Effort: Pulmonary effort is normal.      Breath sounds: Normal breath sounds. Abdominal:      General: Abdomen is flat. There is no distension. Musculoskeletal:         General: No tenderness. Cervical back: Normal range of motion and neck supple. Right lower leg: No edema. Left lower leg: No edema.       Comments: SLRT +ve on both legs at 45@   Neurological: General: No focal deficit present. Mental Status: He is oriented to person, place, and time. Motor: No weakness. Psychiatric:         Mood and Affect: Mood normal.         Behavior: Behavior normal.             An electronic signature was used to authenticate this note.   -- Penelope Arias MD

## 2022-01-06 DIAGNOSIS — R73.03 PREDIABETES: ICD-10-CM

## 2022-01-06 RX ORDER — METFORMIN HYDROCHLORIDE 500 MG/1
TABLET ORAL
Qty: 90 TABLET | Refills: 0 | Status: SHIPPED | OUTPATIENT
Start: 2022-01-06 | End: 2022-02-10

## 2022-01-21 ENCOUNTER — TELEPHONE (OUTPATIENT)
Dept: PRIMARY CARE CLINIC | Age: 87
End: 2022-01-21

## 2022-01-21 DIAGNOSIS — E11.9 CONTROLLED TYPE 2 DIABETES MELLITUS WITHOUT COMPLICATION, WITHOUT LONG-TERM CURRENT USE OF INSULIN (HCC): Primary | ICD-10-CM

## 2022-01-21 DIAGNOSIS — E78.2 MIXED HYPERLIPIDEMIA: ICD-10-CM

## 2022-02-10 DIAGNOSIS — R73.03 PREDIABETES: ICD-10-CM

## 2022-02-10 RX ORDER — METFORMIN HYDROCHLORIDE 500 MG/1
TABLET ORAL
Qty: 90 TABLET | Refills: 0 | Status: SHIPPED | OUTPATIENT
Start: 2022-02-10 | End: 2022-06-21

## 2022-02-18 ENCOUNTER — PATIENT MESSAGE (OUTPATIENT)
Dept: PRIMARY CARE CLINIC | Age: 87
End: 2022-02-18

## 2022-02-18 ENCOUNTER — VIRTUAL VISIT (OUTPATIENT)
Dept: PRIMARY CARE CLINIC | Age: 87
End: 2022-02-18
Payer: MEDICARE

## 2022-02-18 DIAGNOSIS — R51.9 SEVERE HEADACHE: Primary | ICD-10-CM

## 2022-02-18 DIAGNOSIS — R11.0 NAUSEA: ICD-10-CM

## 2022-02-18 DIAGNOSIS — E11.9 WELL CONTROLLED DIABETES MELLITUS (HCC): ICD-10-CM

## 2022-02-18 PROCEDURE — G8420 CALC BMI NORM PARAMETERS: HCPCS | Performed by: INTERNAL MEDICINE

## 2022-02-18 PROCEDURE — 99213 OFFICE O/P EST LOW 20 MIN: CPT | Performed by: INTERNAL MEDICINE

## 2022-02-18 PROCEDURE — G8432 DEP SCR NOT DOC, RNG: HCPCS | Performed by: INTERNAL MEDICINE

## 2022-02-18 PROCEDURE — 1101F PT FALLS ASSESS-DOCD LE1/YR: CPT | Performed by: INTERNAL MEDICINE

## 2022-02-18 PROCEDURE — G8427 DOCREV CUR MEDS BY ELIG CLIN: HCPCS | Performed by: INTERNAL MEDICINE

## 2022-02-18 PROCEDURE — G8536 NO DOC ELDER MAL SCRN: HCPCS | Performed by: INTERNAL MEDICINE

## 2022-02-18 RX ORDER — PREDNISONE 20 MG/1
TABLET ORAL
Qty: 12 TABLET | Refills: 0 | Status: SHIPPED | OUTPATIENT
Start: 2022-02-18 | End: 2022-05-13 | Stop reason: ALTCHOICE

## 2022-02-18 RX ORDER — ONDANSETRON 8 MG/1
8 TABLET, ORALLY DISINTEGRATING ORAL
Qty: 10 TABLET | Refills: 0 | Status: SHIPPED | OUTPATIENT
Start: 2022-02-18 | End: 2022-05-16

## 2022-02-18 NOTE — TELEPHONE ENCOUNTER
From: Jodi Mejias  To: Ruth Telles MD  Sent: 2/18/2022 9:26 AM EST  Subject: Headache     Headache is still terribly persisting and it is disturbing my sleep I checked my blood sugar early this morning it s 80 Blood pressure is 125 I think there should be something done please

## 2022-02-18 NOTE — TELEPHONE ENCOUNTER
Called patient- no answer, left a VM trying to see if the patient can do a VV to discuss his symptoms with Dr Paulina Moreno if possible. If he does a VV we need to make sure he has someone there to assist with the appointment.

## 2022-02-18 NOTE — PROGRESS NOTES
Two Wilcox [place] (: 1934) is a 80 y.o. male, established patient, here for evaluation of the following chief complaint(s):   Headache     Written by Doug Conteh, as dictated by Dr. Ankita Dykes MD.      ASSESSMENT/PLAN:  Below is the assessment and plan developed based on review of pertinent history, labs, studies, and medications. 1. Severe headache  Prescribed Prednisone. Advised patient to take Prednisone in a taper like so: 3 tablets x2 days, 2 tablets x2 days, 1 tablet x1 day, and a half tablet x1 day. Potential side effects were discussed. Advised patient to take Prednisone with food to avoid reflux sx. Will order a head CT and/or refer to Neurology if his headache does not improve. -     predniSONE (DELTASONE) 20 mg tablet; Prednisone 60 mg po x 2 days,40 mg po x 2 days,20 mg po x 1 day,10 mg po x 1 day then stop., Normal, Disp-12 Tablet, R-0 sent to pharmacy. 2. Nausea  I rx'd Zofran ODT 8 mg to take PRN for nausea. Potential side effects were discussed. -     ondansetron (ZOFRAN ODT) 8 mg disintegrating tablet; Take 1 Tablet by mouth every eight (8) hours as needed for Nausea or Vomiting for up to 10 doses. , Normal, Disp-10 Tablet, R-0 sent to pharmacy. 3. Well controlled diabetes mellitus ([address]  Continue on metformin 500 mg daily. SUBJECTIVE/OBJECTIVE:  HPI   The patient presents virtually today c/o a persistent headache and associated nausea x2 days. Headache is located mainly along the left side of his head. He denies any changes to his vision. The pain has been affecting his sleep. He is on metformin 500 mg daily for diabetes. His BG when last checked was 80.     Patient Active Problem List   Diagnosis Code    Coronary atherosclerosis of native coronary artery I25.10    Hyperlipidemia E78.5    Benign prostatic hyperplasia with weak urinary stream N40.1, R39.12    Well controlled diabetes mellitus ([address] E11.9    Neuroforaminal stenosis of lumbar spine M48.061  Essential hypertension I10        Current Outpatient Medications on File Prior to Visit   Medication Sig Dispense Refill    metFORMIN (GLUCOPHAGE) 500 mg tablet TAKE 1 TABLET BY MOUTH EVERY DAY WITH BREAKFAST 90 Tablet 0    [DISCONTINUED] predniSONE (DELTASONE) 20 mg tablet Prednisone 60 mg po x 2 days,40 mg po x 2 days,20 mg po x 2 days,10 mg po x 2 days then stop. 13 Tablet 0    pravastatin (PRAVACHOL) 20 mg tablet TAKE 1 TABLET BY MOUTH EVERYDAY AT BEDTIME 90 Tablet 0    losartan (COZAAR) 25 mg tablet TAKE 1 TABLET BY MOUTH EVERY DAY 90 Tablet 0    meclizine (ANTIVERT) 12.5 mg tablet TAKE 1 TABLET BY MOUTH THREE TIMES A DAY AS NEEDED FOR DIZZINESS FOR UP TO 10 DAYS 30 Tab 0    tamsulosin (FLOMAX) 0.4 mg capsule Take 1 Cap by mouth daily. 30 Cap 0    LOTEMAX 0.5 % oint   0    VITAMIN D3 2,000 unit cap       aspirin delayed-release (ASPIR-LOW) 81 mg tablet Take 81 mg by mouth daily. No current facility-administered medications on file prior to visit. No Known Allergies    Past Medical History:   Diagnosis Date    BPH (benign prostatic hyperplasia)     Hyperlipemia     Hypertension     RA (rheumatoid arthritis) ([facility]        No past surgical history on file.     Family History   Family history unknown: Yes       Social History     Socioeconomic History    Marital status:      Spouse name: Not on file    Number of children: Not on file    Years of education: Not on file    Highest education level: Not on file   Occupational History    Not on file   Tobacco Use    Smoking status: Former Smoker     Packs/day: 1.00     Years: 15.00     Pack years: 15.00     Types: Cigarettes     Quit date: 3/19/1975     Years since quittin.9    Smokeless tobacco: Never Used   Vaping Use    Vaping Use: Not on file   Substance and Sexual Activity    Alcohol use: No    Drug use: No    Sexual activity: Yes     Partners: Female   Other Topics Concern    Not on file   Social History Narrative  Not on file       Orders Only on 01/21/2022   Component Date Value Ref Range Status    Microalbumin,urine random 01/21/2022 <0.50  MG/DL Final    No reference range has been established.  Creatinine, urine 01/21/2022 57.30  mg/dL Final    No reference range has been established.  Microalbumin/Creat ratio (mg/g cre* 01/21/2022 <9  0 - 30 mg/g Final    Hemoglobin A1c 01/21/2022 5.4  4.0 - 5.6 % Final    Comment: NEW METHOD PLEASE NOTE NEW REFERENCE RANGE  (NOTE)  HbA1C Interpretive Ranges  <5.7              Normal  5.7 - 6.4         Consider Prediabetes  >6.5              Consider Diabetes      Est. average glucose 01/21/2022 108  mg/dL Final    Cholesterol, total 01/21/2022 148  <200 MG/DL Final    Triglyceride 01/21/2022 117  <150 MG/DL Final    Comment: Based on NCEP-ATP III:  Triglycerides <150 mg/dL  is considered normal, 150-199  mg/dL  borderline high,  200-499 mg/dL high and  greater than or equal to 500  mg/dL very high.  HDL Cholesterol 01/21/2022 63  MG/DL Final    Comment: Based on NCEP ATP III, HDL Cholesterol <40 mg/dL is considered low and >60  mg/dL is elevated.       LDL, calculated 01/21/2022 61.6  0 - 100 MG/DL Final    Comment: Based on the NCEP-ATP: LDL-C concentrations are considered  optimal <100 mg/dL,  near optimal/above Normal 100-129 mg/dL Borderline High: 130-159, High: 160-189  mg/dL Very High: Greater than or equal to 190 mg/dL      VLDL, calculated 01/21/2022 23.4  MG/DL Final    CHOL/HDL Ratio 01/21/2022 2.3  0.0 - 5.0   Final    Sodium 01/21/2022 134* 136 - 145 mmol/L Final    Potassium 01/21/2022 4.5  3.5 - 5.1 mmol/L Final    Chloride 01/21/2022 101  97 - 108 mmol/L Final    CO2 01/21/2022 27  21 - 32 mmol/L Final    Anion gap 01/21/2022 6  5 - 15 mmol/L Final    Glucose 01/21/2022 94  65 - 100 mg/dL Final    BUN 01/21/2022 17  6 - 20 MG/DL Final    Creatinine 01/21/2022 1.02  0.70 - 1.30 MG/DL Final    BUN/Creatinine ratio 01/21/2022 17  12 - 20 Final    GFR est AA 01/21/2022 >60  >60 ml/min/1.73m2 Final    GFR est non-AA 01/21/2022 >60  >60 ml/min/1.73m2 Final    Comment: Estimated GFR is calculated using the IDMS-traceable Modification of Diet in  Renal Disease (MDRD) Study equation, reported for both  Americans  (GFRAA) and non- Americans (GFRNA), and normalized to 1.73m2 body  surface area. The physician must decide which value applies to the patient.  Calcium 01/21/2022 9.7  8.5 - 10.1 MG/DL Final    Bilirubin, total 01/21/2022 0.6  0.2 - 1.0 MG/DL Final    ALT (SGPT) 01/21/2022 26  12 - 78 U/L Final    AST (SGOT) 01/21/2022 17  15 - 37 U/L Final    Alk. phosphatase 01/21/2022 72  45 - 117 U/L Final    Protein, total 01/21/2022 7.8  6.4 - 8.2 g/dL Final    Albumin 01/21/2022 4.4  3.5 - 5.0 g/dL Final    Globulin 01/21/2022 3.4  2.0 - 4.0 g/dL Final    A-G Ratio 01/21/2022 1.3  1.1 - 2.2   Final      Review of Systems   Constitutional: Negative for activity change, fatigue and unexpected weight change. HENT: Negative for congestion, ear discharge, ear pain, hearing loss, rhinorrhea and sore throat. Eyes: Negative for pain, discharge and redness. Respiratory: Negative for cough, chest tightness and shortness of breath. Cardiovascular: Negative for leg swelling. Gastrointestinal: Positive for nausea. Negative for abdominal pain, constipation and diarrhea. Endocrine: Negative for polyuria. Genitourinary: Negative for dysuria, flank pain and urgency. Musculoskeletal: Negative for arthralgias, back pain and myalgias. Skin: Negative for color change. Neurological: Positive for headaches. Negative for dizziness and light-headedness. Psychiatric/Behavioral: Negative for dysphoric mood and sleep disturbance. The patient is not nervous/anxious.          Physical Exam    [INSTRUCTIONS:  \"[x]\" Indicates a positive item  \"[]\" Indicates a negative item  -- DELETE ALL ITEMS NOT EXAMINED]    Constitutional: [x] Appears well-developed and well-nourished [x] No apparent distress      [] Abnormal -     Mental status: [x] Alert and awake  [x] Oriented to person/place/time [x] Able to follow commands    [] Abnormal -     Eyes:   EOM    [x]  Normal    [] Abnormal -   Sclera  [x]  Normal    [] Abnormal -          Discharge [x]  None visible   [] Abnormal -     HENT: [x] Normocephalic, atraumatic  [] Abnormal -   [x] Mouth/Throat: Mucous membranes are moist    External Ears [x] Normal  [] Abnormal -    Neck: [x] No visualized mass [] Abnormal -     Pulmonary/Chest: [x] Respiratory effort normal   [x] No visualized signs of difficulty breathing or respiratory distress        [] Abnormal -      Musculoskeletal:   [x] Normal gait with no signs of ataxia         [x] Normal range of motion of neck        [] Abnormal -     Neurological:        [x] No Facial Asymmetry (Cranial nerve 7 motor function) (limited exam due to video visit)          [x] No gaze palsy        [] Abnormal -          Skin:        [x] No significant exanthematous lesions or discoloration noted on facial skin         [] Abnormal -            Psychiatric:       [x] Normal Affect [] Abnormal -        [x] No Hallucinations    Other pertinent observable physical exam findings:-    Hema Morejon, was evaluated through a synchronous (real-time) audio-video encounter. The patient (or guardian if applicable) is aware that this is a billable service, which includes applicable co-pays. Verbal consent to proceed has been obtained. The visit was conducted pursuant to the emergency declaration under the [address] [place] [address] and the [employer] General Act. Patient identification was verified, and a caregiver was present when appropriate. The patient was located at home in a state where the provider was licensed to provide care.        An electronic signature was used to authenticate this note.   -- Dot Colón

## 2022-02-28 DIAGNOSIS — R51.9 SEVERE HEADACHE: Primary | ICD-10-CM

## 2022-03-08 ENCOUNTER — HOSPITAL ENCOUNTER (OUTPATIENT)
Dept: CT IMAGING | Age: 87
Discharge: HOME | End: 2022-03-08
Attending: INTERNAL MEDICINE
Payer: MEDICARE

## 2022-03-08 DIAGNOSIS — J32.2 CHRONIC ETHMOIDAL SINUSITIS: ICD-10-CM

## 2022-03-08 DIAGNOSIS — R51.9 SEVERE HEADACHE: ICD-10-CM

## 2022-03-08 DIAGNOSIS — D32.9 MENINGIOMA (HCC): Primary | ICD-10-CM

## 2022-03-08 PROCEDURE — 70470 CT HEAD/BRAIN W/O & W/DYE: CPT

## 2022-03-08 PROCEDURE — 74011000636 HC RX REV CODE- 636: Performed by: INTERNAL MEDICINE

## 2022-03-08 RX ORDER — AMOXICILLIN AND CLAVULANATE POTASSIUM 875; 125 MG/1; MG/1
1 TABLET, FILM COATED ORAL EVERY 12 HOURS
Qty: 20 TABLET | Refills: 0 | Status: SHIPPED | OUTPATIENT
Start: 2022-03-08 | End: 2022-03-18

## 2022-03-08 RX ADMIN — IOPAMIDOL 100 ML: 755 INJECTION, SOLUTION INTRAVENOUS at 09:19

## 2022-03-09 NOTE — PROGRESS NOTES
Result discussed with patient. Neurology referral placed. Number given to call. Abx sent for sinusitis.

## 2022-03-14 ENCOUNTER — TELEPHONE (OUTPATIENT)
Dept: PRIMARY CARE CLINIC | Age: 87
End: 2022-03-14

## 2022-03-14 NOTE — TELEPHONE ENCOUNTER
Spoke with patient. Explained to him message sent from Dr Pamela Lopez. Patient stated that he understood.

## 2022-03-14 NOTE — TELEPHONE ENCOUNTER
----- Message from Eliza Ramirez MD sent at 3/13/2022  8:19 PM EDT -----  Please let him know Dr. Juan Carcamo will try to move his appointment earlier. In the meantime he should finish his Antibiotic course.  ----- Message -----  From: Mahendra Ayala MD  Sent: 3/11/2022   1:27 PM EDT  To: MD Dr Johnathan Wilcox    Thanks for the message. In reviewing his chart, imaging suspect that the meningioma has been present for some time given calcification, appears contralateral to his described pain? I can ask nurses to add patient to cancellation list. If headache persists in meantime, could consider low dose tca like of 10mg of pamelor given age or alternate until he can be seen    Major Arnt  ----- Message -----  From: Ricco Dietz MD  Sent: 3/11/2022   1:10 PM EST  To: MD Agus Martinez [place] you are doing well. I referred you a patient last week as he was having  severe headaches. He was told by your office that you can`t see him until June. I did CT scan which showed just a meningioma and I am not sure if his headaches are coming from it. However, I am treating his sinusitis at this time but was wondering if you could see him earlier. Thanks so much! Jesenia.

## 2022-03-18 PROBLEM — I10 ESSENTIAL HYPERTENSION: Status: ACTIVE | Noted: 2020-01-30

## 2022-03-19 PROBLEM — E11.9 WELL CONTROLLED DIABETES MELLITUS (HCC): Status: ACTIVE | Noted: 2019-08-17

## 2022-03-19 PROBLEM — N40.1 BENIGN PROSTATIC HYPERPLASIA WITH WEAK URINARY STREAM: Status: ACTIVE | Noted: 2018-10-01

## 2022-03-19 PROBLEM — R39.12 BENIGN PROSTATIC HYPERPLASIA WITH WEAK URINARY STREAM: Status: ACTIVE | Noted: 2018-10-01

## 2022-03-19 PROBLEM — M48.061 NEUROFORAMINAL STENOSIS OF LUMBAR SPINE: Status: ACTIVE | Noted: 2020-01-30

## 2022-03-26 DIAGNOSIS — E78.2 MIXED HYPERLIPIDEMIA: ICD-10-CM

## 2022-03-26 RX ORDER — PRAVASTATIN SODIUM 20 MG/1
TABLET ORAL
Qty: 90 TABLET | Refills: 0 | Status: SHIPPED | OUTPATIENT
Start: 2022-03-26 | End: 2022-04-28

## 2022-04-28 DIAGNOSIS — E78.2 MIXED HYPERLIPIDEMIA: ICD-10-CM

## 2022-04-28 RX ORDER — PRAVASTATIN SODIUM 20 MG/1
TABLET ORAL
Qty: 90 TABLET | Refills: 0 | Status: SHIPPED | OUTPATIENT
Start: 2022-04-28

## 2022-05-11 ENCOUNTER — NURSE TRIAGE (OUTPATIENT)
Dept: OTHER | Facility: CLINIC | Age: 87
End: 2022-05-11

## 2022-05-11 NOTE — TELEPHONE ENCOUNTER
Received call from [facility] at [facility] with Red Flag Complaint. Subjective: Caller states \"Right hip pain/swelling\"     Current Symptoms: Right hip swelling and pain that radiates down to right knee. No known injury. Able to ambulate, denies numbness or tingling. States pain is worse after weight bearing. Onset: a few weeks ago; unchanged    Associated Symptoms: reduced activity    Pain Severity: 8/10; sharp; constant    Temperature: Denies    What has been tried: Hot shower    LMP: NA Pregnant: NA    Recommended disposition: See in Office Today or Tomorrow    Care advice provided, patient verbalizes understanding; denies any other questions or concerns; instructed to call back for any new or worsening symptoms. Patient/Caller agrees with recommended disposition; writer provided warm transfer to [employer] at [facility] for appointment scheduling    Attention Provider: Thank you for allowing me to participate in the care of your patient. The patient was connected to triage in response to information provided to the E[facility]. Please do not respond through this encounter as the response is not directed to a shared pool.       Reason for Disposition   Patient wants to be seen    Protocols used: HIP PAIN-ADULT-[place]

## 2022-05-13 ENCOUNTER — HOSPITAL ENCOUNTER (OUTPATIENT)
Dept: GENERAL RADIOLOGY | Age: 87
Discharge: HOME | End: 2022-05-13
Attending: INTERNAL MEDICINE
Payer: MEDICAID

## 2022-05-13 ENCOUNTER — OFFICE VISIT (OUTPATIENT)
Dept: PRIMARY CARE CLINIC | Age: 87
End: 2022-05-13
Payer: MEDICARE

## 2022-05-13 VITALS
BODY MASS INDEX: 23.34 KG/M2 | WEIGHT: 157.6 LBS | RESPIRATION RATE: 15 BRPM | TEMPERATURE: 97.3 F | HEIGHT: 69 IN | SYSTOLIC BLOOD PRESSURE: 110 MMHG | OXYGEN SATURATION: 97 % | HEART RATE: 56 BPM | DIASTOLIC BLOOD PRESSURE: 63 MMHG

## 2022-05-13 DIAGNOSIS — M79.89 RIGHT LEG SWELLING: ICD-10-CM

## 2022-05-13 DIAGNOSIS — M25.551 RIGHT HIP PAIN: ICD-10-CM

## 2022-05-13 DIAGNOSIS — M25.551 RIGHT HIP PAIN: Primary | ICD-10-CM

## 2022-05-13 DIAGNOSIS — M62.838 MUSCLE SPASM: ICD-10-CM

## 2022-05-13 DIAGNOSIS — M48.061 DEGENERATIVE LUMBAR SPINAL STENOSIS: ICD-10-CM

## 2022-05-13 PROCEDURE — 73502 X-RAY EXAM HIP UNI 2-3 VIEWS: CPT

## 2022-05-13 PROCEDURE — 99214 OFFICE O/P EST MOD 30 MIN: CPT | Performed by: INTERNAL MEDICINE

## 2022-05-13 RX ORDER — MELOXICAM 15 MG/1
15 TABLET ORAL DAILY
Qty: 30 TABLET | Refills: 0 | Status: SHIPPED | OUTPATIENT
Start: 2022-05-13 | End: 2022-06-09

## 2022-05-13 RX ORDER — CYCLOBENZAPRINE HCL 10 MG
10 TABLET ORAL
Qty: 15 TABLET | Refills: 0 | Status: SHIPPED | OUTPATIENT
Start: 2022-05-13 | End: 2022-06-06

## 2022-05-13 NOTE — PROGRESS NOTES
Chief Complaint   Patient presents with    Leg Swelling     on and off for 3 months    Leg Pain       Visit Vitals  /63 (BP 1 Location: Left arm)   Pulse (!) 56   Temp 97.3 °F (36.3 °C)   Resp 15   Ht 5' 9\" (1.753 m)   Wt 157 lb 9.6 oz (71.5 kg)   SpO2 97%   BMI 23.27 kg/m²       1. Have you been to the ER, urgent care clinic since your last visit? Hospitalized since your last visit? No    2. Have you seen or consulted any other health care providers outside of the [address] [place] [address] since your last visit? Include any pap smears or colon screening.  No

## 2022-05-13 NOTE — PROGRESS NOTES
[place] (: 1934) is a 80 y.o. male, established patient, here for evaluation of the following chief complaint(s):  Leg Swelling (on and off for 3 months) and Leg Pain     Written by Melodie Rodas, as dictated by Dr. Idris Casiano MD.      ASSESSMENT/PLAN:  Below is the assessment and plan developed based on review of pertinent history, physical exam, labs, studies, and medications. 1. Right hip pain  Ordered an xray of his right hip to evaluate for any changes. I rx'd Flexeril 10 mg qhs prn and meloxicam 15 mg daily for 2 weeks. Potential side effects were discussed. -     XR HIP RT W OR WO PELV 2-3 VWS; Future  -     cyclobenzaprine (FLEXERIL) 10 mg tablet; Take 1 Tablet by mouth nightly for 15 days. , Normal, Disp-15 Tablet, R-0 sent to pharmacy. -     meloxicam (MOBIC) 15 mg tablet; Take 1 Tablet by mouth daily for 30 days. , Normal, Disp-30 Tablet, R-0 sent to pharmacy. 2. Right leg swelling  Ordered an xray of his right hip to evaluate for any changes. -     XR HIP RT W OR WO PELV 2-3 VWS; Future    3. Muscle spasm  I rx'd Flexeril 10 mg qhs prn and meloxicam 15 mg daily for 2 weeks. Potential side effects were discussed. 4. Degenerative lumbar spinal stenosis  I told him to schedule a follow-up with Dr. Lambert Kowalski (neurosurgery) and let him know that the medication the NP rx'd did not help. -     cyclobenzaprine (FLEXERIL) 10 mg tablet; Take 1 Tablet by mouth nightly for 15 days. , Normal, Disp-15 Tablet, R-0 sent to pharmacy. SUBJECTIVE/OBJECTIVE:  HPI   The patient presents today c/o intermittent right hip/thigh swelling and pain x3 months. He took a hot shower which helped with swelling, but he continues to have pain. States the pain makes it difficult to walk and he has pain with each step. He had an xray of his right hip on 21 which was normal. He has a known hx of degenerative lumbar spinal stenosis and saw Neurosurgery NP who rx'd gabapentin.  The medication has not been helping much with pain. 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 10 Tablet 0    metFORMIN (GLUCOPHAGE) 500 mg tablet TAKE 1 TABLET BY MOUTH EVERY DAY WITH BREAKFAST 90 Tablet 0    losartan (COZAAR) 25 mg tablet TAKE 1 TABLET BY MOUTH EVERY DAY 90 Tablet 0    tamsulosin (FLOMAX) 0.4 mg capsule Take 1 Cap by mouth daily. 30 Cap 0    VITAMIN D3 2,000 unit cap       aspirin delayed-release (ASPIR-LOW) 81 mg tablet Take 81 mg by mouth daily.  [DISCONTINUED] predniSONE (DELTASONE) 20 mg tablet Prednisone 60 mg po x 2 days,40 mg po x 2 days,20 mg po x 1 day,10 mg po x 1 day then stop. 12 Tablet 0    meclizine (ANTIVERT) 12.5 mg tablet TAKE 1 TABLET BY MOUTH THREE TIMES A DAY AS NEEDED FOR DIZZINESS FOR UP TO 10 DAYS (Patient not taking: Reported on 5/13/2022) 30 Tab 0    LOTEMAX 0.5 % oint  (Patient not taking: Reported on 5/13/2022)  0     No current facility-administered medications on file prior to visit. No Known Allergies    Past Medical History:   Diagnosis Date    BPH (benign prostatic hyperplasia)     Hyperlipemia     Hypertension     RA (rheumatoid arthritis) ([facility] [address]        History reviewed. No pertinent surgical history.     Family History   Family history unknown: Yes       Social History     Socioeconomic History    Marital status:      Spouse name: Not on file    Number of children: Not on file    Years of education: Not on file    Highest education level: Not on file   Occupational History    Not on file   Tobacco Use    Smoking status: Former Smoker     Packs/day: 1.00     Years: 15.00     Pack years: 15.00     Types: Cigarettes     Quit date: 3/19/1975     Years since quittin.1    Smokeless tobacco: Never Used   Vaping Use    Vaping Use: Not on file   Substance and Sexual Activity    Alcohol use: No    Drug use: No    Sexual activity: Yes     Partners: Female   Other Topics Concern    Not on file   Social History Narrative    Not on file       No visits with results within 3 Month(s) from this visit. Latest known visit with results is:   Orders Only on 2022   Component Date Value Ref Range Status    Microalbumin,urine random 2022 <0.50  MG/DL Final    No reference range has been established.  Creatinine, urine 2022 57.30  mg/dL Final    No reference range has been established.  Microalbumin/Creat ratio (mg/g cre* 2022 <9  0 - 30 mg/g Final    Hemoglobin A1c 2022 5.4  4.0 - 5.6 % Final    Comment: NEW METHOD PLEASE NOTE NEW REFERENCE RANGE  (NOTE)  HbA1C Interpretive Ranges  <5.7              Normal  5.7 - 6.4         Consider Prediabetes  >6.5              Consider Diabetes      Est. average glucose 2022 108  mg/dL Final    Cholesterol, total 2022 148  <200 MG/DL Final    Triglyceride 2022 117  <150 MG/DL Final    Comment: Based on NCEP-ATP III:  Triglycerides <150 mg/dL  is considered normal, 150-199  mg/dL  borderline high,  200-499 mg/dL high and  greater than or equal to 500  mg/dL very high.  HDL Cholesterol 2022 63  MG/DL Final    Comment: Based on NCEP ATP III, HDL Cholesterol <40 mg/dL is considered low and >60  mg/dL is elevated.       LDL, calculated 2022 61.6  0 - 100 MG/DL Final    Comment: Based on the NCEP-ATP: LDL-C concentrations are considered  optimal <100 mg/dL,  near optimal/above Normal 100-129 mg/dL Borderline High: 130-159, High: 160-189  mg/dL Very High: Greater than or equal to 190 mg/dL      VLDL, calculated 01/21/2022 23.4  MG/DL Final    CHOL/HDL Ratio 01/21/2022 2.3  0.0 - 5.0   Final    Sodium 01/21/2022 134* 136 - 145 mmol/L Final    Potassium 01/21/2022 4.5  3.5 - 5.1 mmol/L Final    Chloride 01/21/2022 101  97 - 108 mmol/L Final    CO2 01/21/2022 27  21 - 32 mmol/L Final    Anion gap 01/21/2022 6  5 - 15 mmol/L Final    Glucose 01/21/2022 94  65 - 100 mg/dL Final    BUN 01/21/2022 17  6 - 20 MG/DL Final    Creatinine 01/21/2022 1.02  0.70 - 1.30 MG/DL Final    BUN/Creatinine ratio 01/21/2022 17  12 - 20   Final    GFR est AA 01/21/2022 >60  >60 ml/min/1.73m2 Final    GFR est non-AA 01/21/2022 >60  >60 ml/min/1.73m2 Final    Comment: Estimated GFR is calculated using the IDMS-traceable Modification of Diet in  Renal Disease (MDRD) Study equation, reported for both  Americans  (GFRAA) and non- Americans (GFRNA), and normalized to 1.73m2 body  surface area. The physician must decide which value applies to the patient.  Calcium 01/21/2022 9.7  8.5 - 10.1 MG/DL Final    Bilirubin, total 01/21/2022 0.6  0.2 - 1.0 MG/DL Final    ALT (SGPT) 01/21/2022 26  12 - 78 U/L Final    AST (SGOT) 01/21/2022 17  15 - 37 U/L Final    Alk. phosphatase 01/21/2022 72  45 - 117 U/L Final    Protein, total 01/21/2022 7.8  6.4 - 8.2 g/dL Final    Albumin 01/21/2022 4.4  3.5 - 5.0 g/dL Final    Globulin 01/21/2022 3.4  2.0 - 4.0 g/dL Final    A-G Ratio 01/21/2022 1.3  1.1 - 2.2   Final      Review of Systems   Constitutional: Negative for activity change, fatigue and unexpected weight change. HENT: Negative for congestion, ear discharge, ear pain, hearing loss, rhinorrhea and sore throat. Eyes: Negative for pain, discharge and redness.    Respiratory: Negative for cough, chest tightness and shortness of breath. Cardiovascular: Negative for leg swelling. Gastrointestinal: Negative for abdominal pain, constipation and diarrhea. Endocrine: Negative for polyuria. Genitourinary: Negative for dysuria, flank pain and urgency. Musculoskeletal: Positive for arthralgias, back pain and joint swelling. Negative for myalgias. Skin: Negative for color change. Neurological: Negative for dizziness, light-headedness and headaches. Psychiatric/Behavioral: Negative for dysphoric mood and sleep disturbance. The patient is not nervous/anxious. Visit Vitals  /63 (BP 1 Location: Left arm)   Pulse (!) 56   Temp 97.3 °F (36.3 °C)   Resp 15   Ht 5' 9\" (1.753 m)   Wt 157 lb 9.6 oz (71.5 kg)   SpO2 97%   BMI 23.27 kg/m²      Physical Exam  Vitals and nursing note reviewed. Constitutional:       General: He is not in acute distress. Appearance: Normal appearance. He is well-developed. He is not diaphoretic. HENT:      Head: Normocephalic and atraumatic. Right Ear: External ear normal.      Left Ear: External ear normal.      Mouth/Throat:      Mouth: Mucous membranes are moist.      Pharynx: Oropharynx is clear. Eyes:      General: No scleral icterus. Right eye: No discharge. Left eye: No discharge. Extraocular Movements: Extraocular movements intact. Conjunctiva/sclera: Conjunctivae normal.      Pupils: Pupils are equal, round, and reactive to light. Cardiovascular:      Rate and Rhythm: Normal rate and regular rhythm. Pulses: Normal pulses. Heart sounds: Normal heart sounds. Pulmonary:      Effort: Pulmonary effort is normal.      Breath sounds: Normal breath sounds. No wheezing. Musculoskeletal:      Right lower leg: No edema. Left lower leg: No edema. Neurological:      Mental Status: He is alert and oriented to person, place, and time.    Psychiatric:         Mood and Affect: Mood and affect normal.       An electronic signature was used to authenticate this note.   -- Lillie Champagne

## 2022-05-16 ENCOUNTER — OFFICE VISIT (OUTPATIENT)
Dept: NEUROLOGY | Age: 87
End: 2022-05-16
Payer: MEDICARE

## 2022-05-16 VITALS
HEIGHT: 69 IN | OXYGEN SATURATION: 95 % | SYSTOLIC BLOOD PRESSURE: 129 MMHG | DIASTOLIC BLOOD PRESSURE: 64 MMHG | HEART RATE: 60 BPM | BODY MASS INDEX: 23.39 KG/M2 | WEIGHT: 157.9 LBS | TEMPERATURE: 98.3 F

## 2022-05-16 DIAGNOSIS — M31.6 TEMPORAL ARTERITIS (HCC): ICD-10-CM

## 2022-05-16 DIAGNOSIS — R51.9 NEW ONSET OF HEADACHES AFTER AGE 50: Primary | ICD-10-CM

## 2022-05-16 PROCEDURE — 99203 OFFICE O/P NEW LOW 30 MIN: CPT | Performed by: PSYCHIATRY & NEUROLOGY

## 2022-05-16 NOTE — LETTER
5/16/2022    Patient: Alix Mosher   YOB: 1934   Date of Visit: 5/16/2022     Guido Cranker, MD  [address] [place] [address]  [place] [address]    Dear Guido Cranker, MD,      Thank you for referring Mr. Autumn Yepez to [facility] for evaluation. My notes for this consultation are attached. If you have questions, please do not hesitate to call me. I look forward to following your patient along with you.       Sincerely,    Justina Ley MD

## 2022-05-16 NOTE — PROGRESS NOTES
[facility]   NEW PATIENT EVALUATION/CONSULTATION       PATIENT NAME: Virginia Bajwa    MRN: [id number]    REASON FOR CONSULTATION: Recurrent left sided cephalgia    22    HISTORY OF PRESENT ILLNESS:  Virginia Bajwa is a 80 y.o. right-hand-dominant male who is a limited historian referred to [facility] neurology clinic for evaluation of left-sided headache. Headache is reported to been present for the past year not really associated with nausea no photo or sonophobia. Described as throbbing in quality can last for a protracted period however. No clear triggers noted. States that the headache is ruining his life though he rarely tries to take any medicine for it. Headache will sometimes wake him up from sleep. Always over the left side of his head. There is no clear jaw or tongue claudication he denies any episodes of vision loss. He does endorse diplopia though this is monocular. Denies any other symptoms. On review of his chart, patient was previous seen by Dr. Onur Blum had unremarkable imaging in thousand 13 though patient does not remember any of this. Does not really admit to agree that his headache could have been present for before this year. However on review of Dr. Mariaelena Blanchard notes he appears to been describing similar left-sided head pain which responded to Topamax.     PAST MEDICAL HISTORY:  Past Medical History:   Diagnosis Date    BPH (benign prostatic hyperplasia)     Hyperlipemia     Hypertension     RA (rheumatoid arthritis) ([facility] [address]        PAST SURGICAL HISTORY:  No relevant past surgical history is noted    FAMILY HISTORY:   Family History   Family history unknown: Yes         SOCIAL HISTORY:  Social History     Socioeconomic History    Marital status:    Tobacco Use    Smoking status: Former Smoker     Packs/day: 1.00     Years: 15.00     Pack years: 15.00     Types: Cigarettes     Quit date: 3/19/1975     Years since quittin.1    Smokeless tobacco: Never Used   Substance and Sexual Activity    Alcohol use: No    Drug use: No    Sexual activity: Yes     Partners: Female         MEDICATIONS:   Current Outpatient Medications   Medication Sig Dispense Refill    cyclobenzaprine (FLEXERIL) 10 mg tablet Take 1 Tablet by mouth nightly for 15 days. 15 Tablet 0    meloxicam (MOBIC) 15 mg tablet Take 1 Tablet by mouth daily for 30 days. 30 Tablet 0    pravastatin (PRAVACHOL) 20 mg tablet TAKE 1 TABLET BY MOUTH EVERYDAY AT BEDTIME 90 Tablet 0    metFORMIN (GLUCOPHAGE) 500 mg tablet TAKE 1 TABLET BY MOUTH EVERY DAY WITH BREAKFAST 90 Tablet 0    losartan (COZAAR) 25 mg tablet TAKE 1 TABLET BY MOUTH EVERY DAY 90 Tablet 0    meclizine (ANTIVERT) 12.5 mg tablet TAKE 1 TABLET BY MOUTH THREE TIMES A DAY AS NEEDED FOR DIZZINESS FOR UP TO 10 DAYS 30 Tab 0    VITAMIN D3 2,000 unit cap       aspirin delayed-release (ASPIR-LOW) 81 mg tablet Take 81 mg by mouth daily.  ondansetron (ZOFRAN ODT) 8 mg disintegrating tablet Take 1 Tablet by mouth every eight (8) hours as needed for Nausea or Vomiting for up to 10 doses. (Patient not taking: Reported on 5/16/2022) 10 Tablet 0    tamsulosin (FLOMAX) 0.4 mg capsule Take 1 Cap by mouth daily. (Patient not taking: Reported on 5/16/2022) 30 Cap 0    LOTEMAX 0.5 % oint  (Patient not taking: Reported on 5/13/2022)  0         ALLERGIES:  No Known Allergies      REVIEW OF SYSTEMS:  10 point ROS reviewed with patient. Please see scanned document under media. PHYSICAL EXAM:  Vital Signs:   Visit Vitals  /64   Pulse 60   Temp 98.3 °F (36.8 °C)   Ht 5' 9\" (1.753 m)   Wt 157 lb 14.4 oz (71.6 kg)   SpO2 95%   BMI 23.32 kg/m²     Pleasant male resting notably in exam room in no distress. HEENT appears grossly unremarkable neck appears supple. Cardiopulmonary exams are unremarkable. Abdomen is nondistended. Extremities are warm/dry.   Neurologically, patient appears alert and oriented attention appears intact. Speech is clear, language fluent. Cranial nerves II through XII appear grossly unremarkable, no optic disc pallor or papilledema is noted. Motorically, patient has normal bulk and tone, 5 out of 5 strength in upper and lower extremities. Sensation appears grossly intact to fine touch in upper and lower extremities. Coordination is intact in upper extremities. Primary gait and station is unremarkable. PERTINENT DATA:  CT Results (maximum last 3): Results from [place] encounter on 03/08/22    CT HEAD W WO CONT    Narrative  EXAM: CT HEAD W WO CONT    INDICATION: Headaches    COMPARISON: MRI examination of the brain of 4/27/2013 is available for  correlation. .    CONTRAST: 100 mL of Isovue-370. TECHNIQUE:CT of the head was performed prior to and following uneventful rapid  bolus intravenous administration of contrast.  Brain and bone windows were  generated. Coronal and sagittal reformats. CT dose reduction was achieved  through use of a standardized protocol tailored for this examination and  automatic exposure control for dose modulation. FINDINGS:  As seen on axial image 28 and coronal image 51, a focal, partially calcified  lesion is noted in the region of the inner table the skull (superiorly on the  right). This finding measures 1 cm in size. This likely represents a meningioma. As seen on axial image 7, a very small, focal soft tissue density is noted  within the right side of the ethmoid sinus. This is compatible with a mucous  retention cyst/polyp. No areas of abnormal density or enhancement are seen. Impression  1. Findings compatible with the presence of a small meningioma (superiorly on  the right). 2. No areas of abnormal density or enhancement involving the brain. 3. Evidence of minimal right-sided ethmoid sinus disease.       Results from [place] encounter on 01/31/20    CT ABD PELV W CONT    Narrative  INDICATION: Right lower quadrant abdominal pain, pain of right lower extremity. CT of the abdomen and pelvis is performed with 5 mm collimation. Study is  performed with PO and 100 cc of nonionic Isovue 370. Sagittal and coronal  reformatted images were also performed. CT dose reduction was achieved with the use of the standardized protocol  tailored for this examination and automatic exposure control for dose  modulation. Direct comparison is made to prior CT of the chest, including the upper abdomen,  dated December 2017. There is no prior CT of the abdomen and pelvis for direct  comparison. Findings:    Lung bases: There is an 8 mm nodular density within the right lower lobe,  unchanged compared to prior December 2017. Liver: There is diffuse fatty infiltration of the liver. Adrenals: Adrenal glands are normal.    Pancreas: The pancreas is normal.    Gallbladder: The gallbladder is normal.    Kidneys: There are several bilateral renal cysts. The kidneys otherwise enhance  promptly and symmetrically. There is no hydronephrosis. Spleen: The spleen is normal.    Lymph nodes. There is no monik hepatitis, mesenteric, retroperitoneal or pelvic  lymphadenopathy. Bowel: No thickened or dilated loop of large or small bowel is visualized. Scattered colonic diverticulosis is noted. Appendix: The appendix is normal.    Urinary bladder: Urinary bladder is partially filled and grossly normal.    Miscellaneous: There is no free intraperitoneal fluid or gas. There is no focal  fluid collection to suggest abscess. Impression  IMPRESSION: No bowel obstruction, ileus or perforation. No intra-abdominal  abscess. Results from [place] encounter on 12/19/17    CT CHEST W CONT    Narrative  INDICATION: follow up on pulmonary nodule. Right lower quadrant pain for 3  months.     COMPARISON: 12/26/2015    TECHNIQUE:  Following the uneventful intravenous administration of 100 cc  Isovue-300, 5 mm axial images were obtained through the chest, abdomen, and  pelvis. Coronal and sagittal reconstructions were generated. CT dose reduction  was achieved through use of a standardized protocol tailored for this  examination and automatic exposure control for dose modulation. FINDINGS:  Chest:  THYROID: No nodule. MEDIASTINUM: No mass or lymphadenopathy. ARACELI: No mass or lymphadenopathy. THORACIC AORTA: No dissection or aneurysm. MAIN PULMONARY ARTERY: Normal in caliber. TRACHEA/BRONCHI: Retained debris is noted in the trachea. The central airways  are otherwise patent. ESOPHAGUS: No wall thickening or dilatation. HEART: Normal in size. PLEURA: No effusion or pneumothorax. LUNGS: There is an unchanged 9 x 5 mm nodule in the right lower lobe series 4  image 157. No new nodules. No airspace disease. Abdomen and pelvis:  LIVER: Mild hepatic steatosis. No mass or biliary dilatation. GALLBLADDER: Unremarkable. SPLEEN: No mass. PANCREAS: No mass or ductal dilatation. ADRENALS: Unremarkable. KIDNEYS: Multiple simple cysts are seen in both kidneys. Subcentimeter hypodense  foci are also noted, which are too small to accurately characterize. No solid  mass, calculus, or hydronephrosis. STOMACH: Small hiatal hernia. SMALL BOWEL: No dilatation or wall thickening. COLON: Scattered diverticula are seen in the colon. APPENDIX: Unremarkable. PERITONEUM: No ascites or pneumoperitoneum. RETROPERITONEUM: No lymphadenopathy or aortic aneurysm. Mild atheromatous  disease. REPRODUCTIVE ORGANS: Heterogeneously attenuating, mildly enlarged prostate  gland. No abnormality in the seminal vesicles. URINARY BLADDER: No mass or calculus. BONES: No destructive bone lesion. Osteopenia. Severe degenerative disc disease  noted at L3-L4 and L4-L5. ADDITIONAL COMMENTS: No hernia identified. Impression  IMPRESSION:    1. No acute findings in the chest, abdomen, or pelvis. 2. Unchanged right lower lobe pulmonary nodule, stable for 2 years.  No further  follow-up needed. No new or concerning nodules. 3. Chronic findings described above. MRI Results (maximum last 3): Results from [place] encounter on 01/31/18    MRI LUMB SPINE WO CONT    Narrative  EXAM:  MRI LUMB SPINE WO CONT    INDICATION:  DDD (degenerative disc disease), lumbar. COMPARISON: 6/27/2014    TECHNIQUE: MR imaging of the lumbar spine was performed using the following  sequences: sagittal T1, T2, STIR;  axial T1, T2.    CONTRAST:  None. FINDINGS:    There is a mild dextroconvex lumbar scoliosis. There is no significant  subluxation. Vertebral body heights are maintained. Marrow signal is normal.  Moderate/severe disc space narrowing is noted at the, progressed at L3-L4 exam  to a lesser extent L4-L5. Acute Modic type endplate changes are noted at the  L3-L4 greater than L4-L5 levels. The conus medullaris terminates at mid L2 level. Mild developmental narrowing of  the spinal canal is noted. Signal and caliber of the distal spinal cord are  within normal limits. Multiple T2 hyperintense lesions are redemonstrated in the kidneys, most  consistent with simple cysts. Lower thoracic spine: No herniation or stenosis. L1-L2:  Minimal disc bulge. Mild bilateral facet arthropathy/hypertrophy. Ligamentum flavum thickening. Mild left neuroforaminal narrowing. Mild spinal  canal stenosis, slightly progressed. L2-L3:  Moderate sized disc osteophyte complex. Mild/moderate bilateral facet  arthropathy/hypertrophy. Ligamentum flavum thickening. Mild right and moderate  left neuroforaminal narrowing. Severe spinal canal stenosis, progressed. L3-L4:  Large disc osteophyte complex. Mild/moderate bilateral facet  arthropathy/hypertrophy. Ligamentum flavum thickening. Severe left and moderate  right neuroforaminal narrowing, progressed. Severe spinal canal stenosis,  further progressed. L4-L5:  Moderate size disc osteophyte complex. Mild bilateral facet  arthropathy/hypertrophy. Ligamentum flavum thickening. Mild right and  moderate/severe left neuroforaminal narrowing. Moderate/severe spinal canal  stenosis. L5-S1:  Mild bilateral facet arthropathy. No significant neuroforaminal  narrowing or spinal canal stenosis. Impression  IMPRESSION:  Progression of degenerative disc disease and degenerative changes, especially at  L3-L4 and L4-L5. Severe spinal canal stenosis is now noted at L2-L3 and L3-L4  and moderate to severe stenosis is present at L4-L5. Neuroforaminal narrowing is  also progressed, now severe on the left L3-L4 and moderate to severe on the left  at L4-L5. Please see above report. Results from [place] encounter on 06/27/14    MRI LUMB SPINE W WO CONT    Narrative  **Final Report**      ICD Codes / Adm. Diagnosis: 724.00  724.5 / Spinal stenosis, unspecified r  Backache, unspecified  Examination:  MRI L Casie Hint AND WO CON  - [id number] - Jun 27 2014 11:52AM  Accession No:  [id number]  Reason:  r/o spinal stenosis, mass lesion      REPORT:  EXAM:  MRI L SPINE W AND WO CON  INDICATION:  r/o spinal stenosis, mass lesion, bilateral leg pain  COMPARISON: None  TECHNIQUE: MR imaging of the lumbar spine was performed with sagittal T1,  T2, STIR;  axial T1, T2, sagittal and axial T1-weighted images following the  IV injection of 8 cc Gadavist    FINDINGS:  There is normal alignment of the lumbar spine. Vertebral body heights are  maintained. There are acute Schmorl's nodes in the contiguous endplates at  the D5-4 level, associated with adjacent marrow edema as well as enhancement  of the localized portion of the intervening disc. The conus medullaris  terminates at L1. There are bilateral renal cysts. A linear enhancing  structure at the posterior aspect of the spinal canal is likely venous. T12/L1:  No significant abnormalities    L1/2: No significant abnormalities of the disc. Mild facet hypertrophy. No  significant stenosis or foraminal narrowing.     L2/3:  Mild bulging disc. Moderate facet and ligamentous hypertrophy. Moderately severe central spinal stenosis with compression of the cauda  equina. No significant foraminal stenosis. L3/4: Moderate bulging disc, with asymmetry to the left. Moderate facet and  ligamentous hypertrophy with severe central spinal stenosis and redundancy  of the cauda equina. No significant foraminal stenosis. L4/5:  Chronic degenerative disc disease with reactive changes of facet  intensity in the endplates. Disc bulge and spondylosis asymmetric to the  left. Bilateral facet hypertrophy, left greater than right. Moderate central  spinal stenosis. Moderately severe left foraminal stenosis. L5/S1: No significant abnormalities of the disc. Mild bilateral facet  hypertrophy. No stenosis or foraminal narrowing. Impression  :    1. L2-3 degenerative changes with moderately severe central spinal stenosis. 2. L3-4 degenerative changes with apparent acute Schmorl's node in the  endplates and reactive changes. Severe central spinal stenosis. 3. L4-5 degenerative changes with moderate central stenosis and moderately  severe left foraminal stenosis. Signing/Reading Doctor: Cesar Sotomayor ([id number])  Approved: Cesar Sotomayor ([id number])  Jun 27 2014  3:45PM      Results from Hospital Encounter encounter on 04/27/13    MRA BRAIN WO CONT    Narrative  **Final Report**      ICD Codes / Adm. Diagnosis: 784.0   / Headache  Examination:  MR HEAD MRA  CON  - [id number] - Apr 27 2013 12:37PM  Accession No:  [id number]  Reason:  atypical headache persistent on L side. .. has ringing in head      REPORT:  Clinical history: Left-sided neck and head pain    INDICATION: Neck and head pain, no history of,    COMPARISON:  None    TECHNIQUE:  3-D time-of-flight MRA of the brain was performed. Multiplanar  reconstructions were obtained.   The patient was a ministered 17 mL of Magnevist contrast material. MRV  imaging was also performed. FINDINGS:    The vertebral arteries are codominant. The basilar artery and its branches  are normal. The internal carotid, anterior cerebral, and middle cerebral  arteries are patent. There is no flow-limiting intracranial stenosis. There  is no aneurysm. There is a posterior communicating artery on the right. There is flow in the transverse and sigmoid sinuses bilaterally. There is  flow in the straight sinus and superior sagittal sinus is well. IMPRESSION:  No flow limiting stenosis or intracranial aneurysm. No evidence of intracranial venous sinus thrombosis.         Signing/Reading Doctor: Timo Stevens ([id number])  Approved: Timo Stevens ([id number])  Apr 27 2013  1:17PM      ASSESSMENT:      Autumn Yepez is a 43-year-old right-hand-dominant male with a reported history of new onset headaches per history though patient was seen by neurology 9 years ago with similar complaints relieved with Topamax     PLAN:  Chronic headache:  Suspect this is recurrence of the same headache patient had 9 years ago when he was seen by Dr. Ashwin Will back then was unrevealing we will repeat for patient comfort, also check ESR and CRP though concern for giant cell arteritis is low  If everything returns unremarkable we will try to remind patient of Topamax though he seems to not remember any of his prior headache given that he is reported to have response to low-dose previously  We will contact patient/family once results are obtained      Justina Ley MD       CC Referring provider:  Romina Jain MD  [address] [facility] [address] [facility],  [address] [facility]    Romina Jain MD

## 2022-05-17 ENCOUNTER — PATIENT MESSAGE (OUTPATIENT)
Dept: NEUROLOGY | Age: 87
End: 2022-05-17

## 2022-05-17 LAB
CRP SERPL HS-MCNC: 0.31 MG/L (ref 0–3)
ERYTHROCYTE [SEDIMENTATION RATE] IN BLOOD BY WESTERGREN METHOD: 2 MM/HR (ref 0–30)

## 2022-05-24 ENCOUNTER — TELEPHONE (OUTPATIENT)
Dept: NEUROLOGY | Age: 87
End: 2022-05-24

## 2022-05-24 NOTE — TELEPHONE ENCOUNTER
Patient son Dewayne Paige )called wanting to know if the order has been sent in for his fathers MRI? Please contact.

## 2022-06-01 ENCOUNTER — HOSPITAL ENCOUNTER (OUTPATIENT)
Dept: MRI IMAGING | Age: 87
Discharge: HOME | End: 2022-06-01
Attending: PSYCHIATRY & NEUROLOGY
Payer: MEDICARE

## 2022-06-01 DIAGNOSIS — R51.9 NEW ONSET OF HEADACHES AFTER AGE 50: ICD-10-CM

## 2022-06-01 PROCEDURE — 70551 MRI BRAIN STEM W/O DYE: CPT

## 2022-06-02 RX ORDER — TOPIRAMATE 25 MG/1
25 TABLET ORAL
Qty: 30 TABLET | Refills: 0 | Status: SHIPPED | OUTPATIENT
Start: 2022-06-02 | End: 2022-06-27

## 2022-06-02 NOTE — TELEPHONE ENCOUNTER
----- Message from Chele Recio MD sent at 6/1/2022 12:45 PM EDT -----  Milana Serna    Can we let this patient's family know that his brain MRI redemonstrates meningioma previously seen on CT scan. As before though he does not remember his headache is the same as he was treated for years ago and if he is interested in treatment would start topamax again given that he had good response previously.     Shen Bautista MD  ----- Message -----  From: [facility] Results In  Sent: 6/1/2022  11:49 AM EDT  To: Chele Recio MD
Patient's son, Miguel Esquivel, on [employer], called, notified of results, and stated to \"please go ahead and sent the medicine in to the pharmacy. \"
16

## 2022-06-06 DIAGNOSIS — M25.551 RIGHT HIP PAIN: ICD-10-CM

## 2022-06-06 DIAGNOSIS — M48.061 DEGENERATIVE LUMBAR SPINAL STENOSIS: ICD-10-CM

## 2022-06-06 RX ORDER — CYCLOBENZAPRINE HCL 10 MG
10 TABLET ORAL
Qty: 15 TABLET | Refills: 0 | Status: SHIPPED | OUTPATIENT
Start: 2022-06-06 | End: 2022-09-25

## 2022-06-09 DIAGNOSIS — M25.551 RIGHT HIP PAIN: ICD-10-CM

## 2022-06-09 RX ORDER — MELOXICAM 15 MG/1
TABLET ORAL
Qty: 30 TABLET | Refills: 0 | Status: SHIPPED | OUTPATIENT
Start: 2022-06-09 | End: 2022-08-14

## 2022-06-21 DIAGNOSIS — R73.03 PREDIABETES: ICD-10-CM

## 2022-06-21 RX ORDER — METFORMIN HYDROCHLORIDE 500 MG/1
TABLET ORAL
Qty: 90 TABLET | Refills: 0 | Status: SHIPPED | OUTPATIENT
Start: 2022-06-21 | End: 2022-10-23

## 2022-07-06 RX ORDER — TOPIRAMATE 25 MG/1
TABLET ORAL
Qty: 90 TABLET | Refills: 1 | Status: SHIPPED | OUTPATIENT
Start: 2022-07-06 | End: 2022-08-30 | Stop reason: SDUPTHER

## 2022-08-14 DIAGNOSIS — M25.551 RIGHT HIP PAIN: ICD-10-CM

## 2022-08-14 RX ORDER — MELOXICAM 15 MG/1
TABLET ORAL
Qty: 30 TABLET | Refills: 0 | Status: SHIPPED | OUTPATIENT
Start: 2022-08-14 | End: 2022-09-13

## 2022-08-30 ENCOUNTER — OFFICE VISIT (OUTPATIENT)
Dept: NEUROLOGY | Age: 87
End: 2022-08-30
Payer: MEDICARE

## 2022-08-30 VITALS
DIASTOLIC BLOOD PRESSURE: 80 MMHG | WEIGHT: 153 LBS | BODY MASS INDEX: 22.59 KG/M2 | SYSTOLIC BLOOD PRESSURE: 140 MMHG | HEART RATE: 53 BPM | OXYGEN SATURATION: 98 %

## 2022-08-30 DIAGNOSIS — G43.809 OTHER MIGRAINE WITHOUT STATUS MIGRAINOSUS, NOT INTRACTABLE: Primary | ICD-10-CM

## 2022-08-30 PROCEDURE — G8510 SCR DEP NEG, NO PLAN REQD: HCPCS | Performed by: PSYCHIATRY & NEUROLOGY

## 2022-08-30 PROCEDURE — G8420 CALC BMI NORM PARAMETERS: HCPCS | Performed by: PSYCHIATRY & NEUROLOGY

## 2022-08-30 PROCEDURE — G8427 DOCREV CUR MEDS BY ELIG CLIN: HCPCS | Performed by: PSYCHIATRY & NEUROLOGY

## 2022-08-30 PROCEDURE — 99213 OFFICE O/P EST LOW 20 MIN: CPT | Performed by: PSYCHIATRY & NEUROLOGY

## 2022-08-30 PROCEDURE — G8536 NO DOC ELDER MAL SCRN: HCPCS | Performed by: PSYCHIATRY & NEUROLOGY

## 2022-08-30 PROCEDURE — 1123F ACP DISCUSS/DSCN MKR DOCD: CPT | Performed by: PSYCHIATRY & NEUROLOGY

## 2022-08-30 PROCEDURE — 1101F PT FALLS ASSESS-DOCD LE1/YR: CPT | Performed by: PSYCHIATRY & NEUROLOGY

## 2022-08-30 RX ORDER — TOPIRAMATE 25 MG/1
25 TABLET ORAL
Qty: 90 TABLET | Refills: 1 | Status: SHIPPED | OUTPATIENT
Start: 2022-08-30 | End: 2022-09-29

## 2022-08-30 NOTE — PROGRESS NOTES
Neurology Clinic Follow up Note    Patient ID:  Mariann Del Cid  [id number]  89 y.o.  1934      Mr. Ace Vernon is here for follow up today of  Chief Complaint   Patient presents with    Headache     Headaches seem to be better since last visit feels like not enough sleep, or heavy foods           Last Appointment With Me:  5/16/2022       Interval History:     Interval from prior visit patient has been doing reasonably okay. Feels like Topamax has maybe helped his headaches did agree has has focusing on sleep and dietary changes. Previously was getting headaches daily now gets it maybe 1-2 times a week. Does admit to skipping dose medications once or twice a week but in general is compliant. Other than asking about meningioma on his MRI, he has no questions or concerns    PMHx/ PSHx/ FHx/ SHx:  Reviewed and unchanged previous visit. ROS:  Comprehensive review of systems negative except for as noted above. Objective:       Meds:  Current Outpatient Medications   Medication Sig Dispense Refill    topiramate (TOPAMAX) 25 mg tablet Take 1 Tablet by mouth nightly for 30 days. 90 Tablet 1    meloxicam (MOBIC) 15 mg tablet TAKE 1 TABLET BY MOUTH EVERY DAY 30 Tablet 0    metFORMIN (GLUCOPHAGE) 500 mg tablet TAKE 1 TABLET BY MOUTH EVERY DAY WITH BREAKFAST 90 Tablet 0    pravastatin (PRAVACHOL) 20 mg tablet TAKE 1 TABLET BY MOUTH EVERYDAY AT BEDTIME 90 Tablet 0    losartan (COZAAR) 25 mg tablet TAKE 1 TABLET BY MOUTH EVERY DAY 90 Tablet 0    meclizine (ANTIVERT) 12.5 mg tablet TAKE 1 TABLET BY MOUTH THREE TIMES A DAY AS NEEDED FOR DIZZINESS FOR UP TO 10 DAYS 30 Tab 0    VITAMIN D3 2,000 unit cap       aspirin delayed-release 81 mg tablet Take 81 mg by mouth daily. Exam:  Visit Vitals  BP (!) 140/80   Pulse (!) 53   Wt 153 lb (69.4 kg)   SpO2 98%   BMI 22.59 kg/m²     Pleasant member scannable in exam room in no distress. HEENT is grossly unrevealing neck appears supple.   Cardiopulmonary exams are unremarkable to observation Abdomen is nondistended. Extremities are warm/dry. Neurologically, patient appears alert and oriented attention appears intact. Speech is clear, language fluent. Cranial nerves II through XII are grossly unrevealing to observation. Motorically, moves all extremities symmetrically. Coordination appears intact in upper extremities. Remainder of examination deferred. LABS  Results for orders placed or performed in visit on 05/16/22   SED RATE (ESR)   Result Value Ref Range    Sed rate (ESR) 2 0 - 30 mm/hr   CRP, HIGH SENSITIVITY   Result Value Ref Range    C-Reactive Protein, Cardiac 0.31 0.00 - 3.00 mg/L       IMAGING:  MRI Results (most recent):  Results from Hospital Encounter encounter on 06/01/22    MRI BRAIN WO CONT    Narrative  EXAM:  MRI BRAIN WO CONT    INDICATION:    New onset headache. COMPARISON:  MRI brain 4/27/2013. CONTRAST: None. TECHNIQUE:  Multiplanar multisequence acquisition without contrast of the brain. FINDINGS:  Stable 1.2 x 1.1 x 1.1 cm calcified extra-axial dural based mass along the right  superior frontal convexity, consistent with meningioma. Stable few scattered  periventricular and deep white matter T2/FLAIR hyperintensities, consistent with  mild chronic microvascular ischemic disease. Unchanged mild generalized  parenchymal volume loss with commensurate dilation of the sulci and ventricular  system. There is no acute infarct or hemorrhage. There is no cerebellar  tonsillar herniation. Expected arterial flow-voids are present. The paranasal sinuses, mastoid air cells, and middle ears are clear. The orbital  contents are within normal limits. No significant osseous or scalp lesions are  identified. Facet arthropathy noted in the upper cervical spine. Impression  1. Stable 1.2 cm right superior frontal calcified meningioma. 2. No acute intracranial abnormality.  Unchanged mild generalized parenchymal  volume loss and mild chronic microvascular ischemic disease.       Assessment:     Analisa Jack is a 68-year-old right-hand-dominant male who presents to [facility] neurology clinic for ongoing management and evaluation of unilateral headache consistent with migrainous phenomenon    Plan:   Common migraine:  Appears most likely diagnosis as headache again has been intermittent over the past decade per chart review  Has been tolerating Topamax without particular side effects, takes largely every day does admit to missing 1-2 doses per week  Patient is hesitant about changing dose, or encourage patient to consider taking it daily as well as continue to track potential triggers/address behavioral factors which may improve headache  Encourage patient to call us with questions concerns or if he desires to increase dose in the interim    Meningioma:  Appears stable and likely asymptomatic    Follow-up in 5 to 6 months      Signed:  Lucia Rea MD  8/30/2022  2:44 PM

## 2022-09-13 DIAGNOSIS — M25.551 RIGHT HIP PAIN: ICD-10-CM

## 2022-09-13 RX ORDER — MELOXICAM 15 MG/1
TABLET ORAL
Qty: 30 TABLET | Refills: 0 | Status: SHIPPED | OUTPATIENT
Start: 2022-09-13 | End: 2022-10-16

## 2022-09-25 DIAGNOSIS — M25.551 RIGHT HIP PAIN: ICD-10-CM

## 2022-09-25 DIAGNOSIS — M48.061 DEGENERATIVE LUMBAR SPINAL STENOSIS: ICD-10-CM

## 2022-09-25 RX ORDER — CYCLOBENZAPRINE HCL 10 MG
10 TABLET ORAL
Qty: 15 TABLET | Refills: 0 | Status: SHIPPED | OUTPATIENT
Start: 2022-09-25 | End: 2022-10-10

## 2022-10-16 DIAGNOSIS — M25.551 RIGHT HIP PAIN: ICD-10-CM

## 2022-10-16 RX ORDER — MELOXICAM 15 MG/1
TABLET ORAL
Qty: 30 TABLET | Refills: 0 | Status: SHIPPED | OUTPATIENT
Start: 2022-10-16

## 2022-10-23 DIAGNOSIS — R73.03 PREDIABETES: ICD-10-CM

## 2022-10-23 RX ORDER — METFORMIN HYDROCHLORIDE 500 MG/1
TABLET ORAL
Qty: 90 TABLET | Refills: 0 | Status: SHIPPED | OUTPATIENT
Start: 2022-10-23

## 2022-10-24 ENCOUNTER — NURSE TRIAGE (OUTPATIENT)
Dept: OTHER | Facility: CLINIC | Age: 87
End: 2022-10-24

## 2022-10-24 NOTE — TELEPHONE ENCOUNTER
Location of patient: [address] [facility]  call from Aj Hebert at [facility] with [email or web address]. Subjective: Caller states \"I have a terrible pain in my stomach\"     Current Symptoms: abdominal pain     Onset: 5 days ago;       Pain Severity: 9/10; Temperature: denies     What has been tried: \"stomach upset medicine\"    Deneis - vomiting / diarrhea / bloody black or tarry stool / juandice / blood in urine      Recommended disposition: See in Office Today    Care advice provided, patient verbalizes understanding; denies any other questions or concerns; instructed to call back for any new or worsening symptoms. Patient/Caller agrees with recommended disposition; writer [facility] for [facility] scheduling    Attention Provider: Thank you for allowing me to participate in the care of your patient. The patient was connected to triage in response to information provided to the ECC. Please do not respond through this encounter as the response is not directed to a shared pool.         Reason for Disposition   MODERATE pain (e.g., interferes with normal activities) that comes and goes (cramps) lasts > 24 hours  (Exception: pain with Vomiting or Diarrhea - see that Protocol)    Protocols used: Abdominal Pain - Male-ADULT-[place]

## 2022-10-26 ENCOUNTER — OFFICE VISIT (OUTPATIENT)
Dept: PRIMARY CARE CLINIC | Age: 87
End: 2022-10-26
Payer: MEDICARE

## 2022-10-26 VITALS
HEIGHT: 69 IN | HEART RATE: 58 BPM | RESPIRATION RATE: 18 BRPM | OXYGEN SATURATION: 97 % | DIASTOLIC BLOOD PRESSURE: 70 MMHG | WEIGHT: 153 LBS | BODY MASS INDEX: 22.66 KG/M2 | SYSTOLIC BLOOD PRESSURE: 132 MMHG | TEMPERATURE: 97.1 F

## 2022-10-26 DIAGNOSIS — R10.32 LEFT LOWER QUADRANT ABDOMINAL PAIN: ICD-10-CM

## 2022-10-26 DIAGNOSIS — K59.09 OTHER CONSTIPATION: ICD-10-CM

## 2022-10-26 DIAGNOSIS — B02.29 POSTHERPETIC NEURALGIA: ICD-10-CM

## 2022-10-26 DIAGNOSIS — Z23 NEEDS FLU SHOT: ICD-10-CM

## 2022-10-26 DIAGNOSIS — Z71.89 ACP (ADVANCE CARE PLANNING): ICD-10-CM

## 2022-10-26 DIAGNOSIS — E11.9 CONTROLLED TYPE 2 DIABETES MELLITUS WITHOUT COMPLICATION, WITHOUT LONG-TERM CURRENT USE OF INSULIN (HCC): ICD-10-CM

## 2022-10-26 DIAGNOSIS — I10 PRIMARY HYPERTENSION: ICD-10-CM

## 2022-10-26 DIAGNOSIS — Z00.00 MEDICARE ANNUAL WELLNESS VISIT, SUBSEQUENT: Primary | ICD-10-CM

## 2022-10-26 DIAGNOSIS — E78.2 MIXED HYPERLIPIDEMIA: ICD-10-CM

## 2022-10-26 PROCEDURE — 90694 VACC AIIV4 NO PRSRV 0.5ML IM: CPT | Performed by: INTERNAL MEDICINE

## 2022-10-26 PROCEDURE — 1101F PT FALLS ASSESS-DOCD LE1/YR: CPT | Performed by: INTERNAL MEDICINE

## 2022-10-26 PROCEDURE — G0439 PPPS, SUBSEQ VISIT: HCPCS | Performed by: INTERNAL MEDICINE

## 2022-10-26 PROCEDURE — 3044F HG A1C LEVEL LT 7.0%: CPT | Performed by: INTERNAL MEDICINE

## 2022-10-26 PROCEDURE — G8427 DOCREV CUR MEDS BY ELIG CLIN: HCPCS | Performed by: INTERNAL MEDICINE

## 2022-10-26 PROCEDURE — 99214 OFFICE O/P EST MOD 30 MIN: CPT | Performed by: INTERNAL MEDICINE

## 2022-10-26 PROCEDURE — 1123F ACP DISCUSS/DSCN MKR DOCD: CPT | Performed by: INTERNAL MEDICINE

## 2022-10-26 PROCEDURE — G0008 ADMIN INFLUENZA VIRUS VAC: HCPCS | Performed by: INTERNAL MEDICINE

## 2022-10-26 PROCEDURE — G8432 DEP SCR NOT DOC, RNG: HCPCS | Performed by: INTERNAL MEDICINE

## 2022-10-26 PROCEDURE — G8536 NO DOC ELDER MAL SCRN: HCPCS | Performed by: INTERNAL MEDICINE

## 2022-10-26 PROCEDURE — G8420 CALC BMI NORM PARAMETERS: HCPCS | Performed by: INTERNAL MEDICINE

## 2022-10-26 RX ORDER — POLYETHYLENE GLYCOL 3350 17 G/17G
17 POWDER, FOR SOLUTION ORAL 2 TIMES DAILY
Qty: 340 G | Refills: 0 | Status: SHIPPED | OUTPATIENT
Start: 2022-10-26 | End: 2022-11-05

## 2022-10-26 RX ORDER — LIDOCAINE 50 MG/G
PATCH TOPICAL
Qty: 5 PATCH | Refills: 2 | Status: SHIPPED | OUTPATIENT
Start: 2022-10-26

## 2022-10-26 NOTE — PROGRESS NOTES
1. \"Have you been to the ER, urgent care clinic since your last visit? Hospitalized since your last visit? \" Yes [facility]   has shingles 2 days ago, feels better, Shingles on abdominal     2. \"Have you seen or consulted any other health care providers outside of the [address] [place] [address] since your last visit? \" Yes When: [facility] and doctor   for back     3. For patients aged 39-70: Has the patient had a colonoscopy / FIT/ Cologuard? Yes - no Care Gap present      . Chief Complaint   Patient presents with    Abdominal Pain     6 days ago. Left lower quadrant. Has shingles 25 days ago. Annual Wellness Visit    Constipation     3 days hasn't had a BM. Carrie Haas is a 80 y.o. male and presents for Annual Medicare Wellness Visit. Assessment of cognitive impairment: Alert and oriented x 3    Depression Screen:   3 most recent PHQ Screens 8/30/2022   Little interest or pleasure in doing things Several days   Feeling down, depressed, irritable, or hopeless Several days   Total Score PHQ 2 2       Fall Risk Assessment:    Fall Risk Assessment, last 12 mths 8/30/2022   Able to walk? Yes   Fall in past 12 months? 0   Do you feel unsteady? 1   Are you worried about falling 1   Is TUG test greater than 12 seconds? -   Is the gait abnormal? -   Number of falls in past 12 months -   Fall with injury? -       .  Abuse Screening Questionnaire 10/26/2022   Do you ever feel afraid of your partner? N   Are you in a relationship with someone who physically or mentally threatens you? N   Is it safe for you to go home? Y          Activities of Daily Living:  Self-care. Requires assistance with: no ADLs  Patient handle his/her own medications  Yes Use of pill box  no      .   ADL Assessment 10/26/2022   Feeding yourself No Help Needed   Getting from bed to chair No Help Needed   Getting dressed No Help Needed   Bathing or showering No Help Needed   Walk across the room (includes cane/walker) No Help Needed   Using the telphone No Help Needed   Taking your medications No Help Needed   Preparing meals No Help Needed   Managing money (expenses/bills) No Help Needed   Moderately strenuous housework (laundry) No Help Needed   Shopping for personal items (toiletries/medicines) No Help Needed   Shopping for groceries No Help Needed   Driving No Help Needed   Climbing a flight of stairs No Help Needed   Getting to places beyond walking distances No Help Needed          Health Maintenance:  Daily Aspirin: yes   Bone Density: No  Glaucoma Screening: yes,   Immunizations:    Tetanus: up to date. Influenza: up to date. Shingles: No.due to the cost   PPSV-23: up to date. Prevnar-13: No. Covid19: up to date    Cancer screening:    Colon: up to date    Prostate: up to date     Alcohol Risk Screen:   On any occasion during the past 3 months, have you had more than 3 drinks(female) or 4 drinks (male) containing alcohol in one? No  Do you average more than 7 drinks (female) or 14 drinks (male) per week? No  Type and amount:None    Hearing Loss:  Hearing is good. Vision Loss:   Wears glasses, contact lenses, or have any other visual impairment  no    Adult Nutrition Screen:  No risk factors noted. Advance Care Planning:   End of Life Planning: has NO advanced directive - additional information provided   Ariel Parker ACP-Facilitator appointment yes       Medications/Allergies: Reviewed with patient  Prior to Admission medications    Medication Sig Start Date End Date Taking?  Authorizing Provider   metFORMIN (GLUCOPHAGE) 500 mg tablet TAKE 1 TABLET BY MOUTH EVERY DAY WITH BREAKFAST 10/23/22  Yes Kate Mccracken MD   meloxicam (MOBIC) 15 mg tablet TAKE 1 TABLET BY MOUTH EVERY DAY 10/16/22  Yes Kate Mccracken MD   pravastatin (PRAVACHOL) 20 mg tablet TAKE 1 TABLET BY MOUTH EVERYDAY AT BEDTIME 4/28/22  Yes Kate Mccracken MD   losartan (COZAAR) 25 mg tablet TAKE 1 TABLET BY MOUTH EVERY DAY 21  Yes Cris Medrano MD   meclizine (ANTIVERT) 12.5 mg tablet TAKE 1 TABLET BY MOUTH THREE TIMES A DAY AS NEEDED FOR DIZZINESS FOR UP TO 10 DAYS 21  Yes Patrecia Mcburney, NP   VITAMIN D3 2,000 unit cap  14  Yes Provider, Historical   aspirin delayed-release 81 mg tablet Take 81 mg by mouth daily. Yes Provider, Historical       No Known Allergies    Objective:  Visit Vitals  /70 (BP 1 Location: Left upper arm, BP Patient Position: Sitting)   Pulse (!) 58   Temp 97.1 °F (36.2 °C) (Temporal)   Resp 18   Ht 5' 9\" (1.753 m)   Wt 153 lb (69.4 kg)   SpO2 97%   BMI 22.59 kg/m²    Body mass index is 22.59 kg/m². Problem List: Reviewed with patient and discussed risk factors. 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 No pertinent surgical history. SH: Reviewed with patient  Social History     Tobacco Use    Smoking status: Former     Packs/day: 1.00     Years: 15.00     Pack years: 15.00     Types: Cigarettes     Quit date: 3/19/1975     Years since quittin.6    Smokeless tobacco: Never   Substance Use Topics    Alcohol use: No    Drug use: No       FH: Reviewed with patient  Family History   Family history unknown: Yes       Current medical providers:    Patient Care Team:  Cris Medrano MD as PCP - General (Internal Medicine Physician)  Cris Medrano MD as PCP - [facility] Empaneled Provider    Plan:    Diagnoses and all orders for this visit:    Medicare annual wellness visit, subsequent  . Age appropriate Health screening and immunization discussed with patient.       ACP (advance care planning)  -     REFERRAL TO ACP CLINICAL SPECIALIST    Needs flu shot  -     INFLUENZA, FLUAD, (AGE 72 Y+), IM, PF, 0.5 ML             Health Maintenance   Topic Date Due    Shingrix Vaccine Age 49> (1 of 2) Never done    Eye Exam Retinal or Dilated  08/11/2019    DTaP/Tdap/Td series (2 - Td or Tdap) 06/17/2021    Foot Exam Q1  07/13/2022    Medicare Yearly Exam  07/14/2022    Flu Vaccine (1) 08/01/2022    COVID-19 Vaccine (5 - Booster for Moderna series) 09/23/2022    MICROALBUMIN Q1  01/21/2023    Lipid Screen  01/21/2023    Depression Screen  08/30/2023    Pneumococcal 65+ years  Completed          Urinary/ Fecal Incontinence: Constipation     Regular physical exercise: Walking daily     Patient verbalized understanding of information presented. AVS and Medicare Part B Preventive Services Table printed and given to pt and reviewed. See table for findings under Recommendation and Scheduled. All of the patient's questions were answered.

## 2022-10-27 ENCOUNTER — PATIENT OUTREACH (OUTPATIENT)
Dept: CASE MANAGEMENT | Age: 87
End: 2022-10-27

## 2022-10-27 ENCOUNTER — TELEPHONE (OUTPATIENT)
Dept: PRIMARY CARE CLINIC | Age: 87
End: 2022-10-27

## 2022-10-27 NOTE — TELEPHONE ENCOUNTER
Patient called pharmacy yesterday and pharmacy told patient that the medicines that Johnathan Ramos ordered is not covered by his insurance. One medicine is Lidocaine, the other medicine, patient says its like a  laxative. He does not know the name of it. But Im thinking its the miralax. Patient would like a call back.      [phone]

## 2022-10-27 NOTE — PROGRESS NOTES
Marce Lovelace (: 1934) is a 80 y.o. male, established patient, here for evaluation of the following chief complaint(s):  Abdominal Pain (6 days ago. Left lower quadrant. Has shingles 25 days ago.), Annual Wellness Visit, and Constipation (3 days hasn't had a BM.)       ASSESSMENT/PLAN:  Below is the assessment and plan developed based on review of pertinent history, physical exam, labs, studies, and medications. 1. Postherpetic neuralgia  -     lidocaine (LIDODERM) 5 %; Apply patch to the affected area for 12 hours a day and remove for 12 hours a day., Normal, Disp-5 Patch, R-2  2. Other constipation  -     polyethylene glycol (MIRALAX) 17 gram/dose powder; Take 17 g by mouth two (2) times a day for 10 days. , Normal, Disp-340 g, R-0. Recommended using prunes in his diet and increase hydration. 3. Left lower quadrant abdominal pain  Explained to him abdominal pain is most likely due to the shingles. -     polyethylene glycol (MIRALAX) 17 gram/dose powder; Take 17 g by mouth two (2) times a day for 10 days. , Normal, Disp-340 g, R-0  4. Controlled type 2 diabetes mellitus without complication, without long-term current use of insulin ([facility])  -      DIABETES FOOT EXAM  -     HEMOGLOBIN A1C WITH EAG; Future  -     MICROALBUMIN, UR, RAND W/ MICROALB/CREAT RATIO; Future  -     REFERRAL TO OPHTHALMOLOGY  5. Primary hypertension  -     METABOLIC PANEL, COMPREHENSIVE; Future  -     CBC W/O DIFF; Future  6. Mixed hyperlipidemia  -     LIPID PANEL; Future        SUBJECTIVE/OBJECTIVE:  Patient seen today with left lower abdominal pain and constipation. [facility] and was diagnosed with shingles. He woke up in the morning with the left sided abdominal pain and then saw small bumps in the lower area. [facility] and antiviral medication for 5 days. His shingle rash has started improving and now there are scabs formed.   He is still having burning sensation and pain in the left lower abdominal area but moving to the mid abdomen area. He is having trouble moving his bowel. He has used over-the-counter stool softener with no relief. No nausea or vomiting. His blood pressure readings have been running in normal range and he has not been checking his blood sugars. 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    Essential hypertension I10        Current Outpatient Medications on File Prior to Visit   Medication Sig Dispense Refill    metFORMIN (GLUCOPHAGE) 500 mg tablet TAKE 1 TABLET BY MOUTH EVERY DAY WITH BREAKFAST 90 Tablet 0    meloxicam (MOBIC) 15 mg tablet TAKE 1 TABLET BY MOUTH EVERY DAY 30 Tablet 0    pravastatin (PRAVACHOL) 20 mg tablet TAKE 1 TABLET BY MOUTH EVERYDAY AT BEDTIME 90 Tablet 0    losartan (COZAAR) 25 mg tablet TAKE 1 TABLET BY MOUTH EVERY DAY 90 Tablet 0    meclizine (ANTIVERT) 12.5 mg tablet TAKE 1 TABLET BY MOUTH THREE TIMES A DAY AS NEEDED FOR DIZZINESS FOR UP TO 10 DAYS 30 Tab 0    VITAMIN D3 2,000 unit cap       aspirin delayed-release 81 mg tablet Take 81 mg by mouth daily. No current facility-administered medications on file prior to visit. No Known Allergies    Past Medical History:   Diagnosis Date    BPH (benign prostatic hyperplasia)     Hyperlipemia     Hypertension     RA (rheumatoid arthritis) ([facility] [address]        History reviewed. No pertinent surgical history.     Family History   Family history unknown: Yes       Social History Socioeconomic History    Marital status:      Spouse name: Not on file    Number of children: Not on file    Years of education: Not on file    Highest education level: Not on file   Occupational History    Not on file   Tobacco Use    Smoking status: Former     Packs/day: 1.00     Years: 15.00     Pack years: 15.00     Types: Cigarettes     Quit date: 3/19/1975     Years since quittin.6    Smokeless tobacco: Never   Vaping Use    Vaping Use: Not on file   Substance and Sexual Activity    Alcohol use: No    Drug use: No    Sexual activity: Yes     Partners: Female   Other Topics Concern    Not on file   Social History Narrative    Not on file     Social Determinants of Health     Financial Resource Strain: Low Risk     Difficulty of Paying Living Expenses: Not hard at all   Food Insecurity: Unknown    Worried About [employer] in the Last Year: Never true    Ran Out of Food in the Last Year: Not on file   Transportation Needs: Not on file   Physical Activity: Not on file   Stress: Not on file   Social Connections: Not on file   Intimate Partner Violence: Not on file   Housing Stability: Not on file       No visits with results within 3 Month(s) from this visit. Latest known visit with results is:   Office Visit on 2022   Component Date Value Ref Range Status    Sed rate (ESR) 2022 2  0 - 30 mm/hr Final    C-Reactive Protein, Cardiac 2022 0.31  0.00 - 3.00 mg/L Final    Comment:          Relative Risk for Future Cardiovascular Event                               Low                 <1.00                               Average       1.00 - 3.00                               High                >3.00        Review of Systems   Constitutional:  Positive for fatigue. Negative for activity change and unexpected weight change. HENT:  Negative for congestion, ear discharge, ear pain, hearing loss, rhinorrhea and sore throat. Eyes:  Negative for pain, discharge and redness. Respiratory:  Negative for cough, chest tightness and shortness of breath. Cardiovascular:  Negative for leg swelling. Gastrointestinal:  Positive for abdominal pain and constipation. Negative for blood in stool and diarrhea. Endocrine: Negative for polyuria. Genitourinary:  Negative for dysuria, hematuria and urgency. Musculoskeletal:  Positive for back pain. Negative for arthralgias and myalgias. Skin:  Negative for color change. Neurological:  Positive for headaches. Negative for dizziness and light-headedness. Psychiatric/Behavioral:  Negative for dysphoric mood and sleep disturbance. The patient is not nervous/anxious. Physical Exam    Vitals and nursing note reviewed. Constitutional:       Appearance: Normal appearance. Eyes:      Extraocular Movements: Extraocular movements intact. Pupils: Pupils are equal, round, and reactive to light. Cardiovascular:      Rate and Rhythm: Normal rate and regular rhythm. Pulmonary:      Effort: Pulmonary effort is normal.      Breath sounds: Normal breath sounds. Abdominal:      General: Bowel sounds are normal. There is no distension. Palpations: left lower quadrant tenderness. Musculoskeletal:         General: No swelling. Normal range of motion. Cervical back: Normal range of motion and neck supple. Right lower leg: No edema. Left lower leg: No edema. Neurological:      General: No focal deficit present. Mental Status:  Alert and oriented to person, place, and time. Motor: No weakness.    Psychiatric:         Mood and Affect: Mood normal.         Behavior: Behavior normal.    Diabetic foot exam:     Left: Vibratory sensation normal    Sharp/dull discrimination normal    Filament test normal sensation with micro filament   Pulse DP: 2+ (normal)   Deformities: None  Right: Vibratory sensation normal   Sharp/dull discrimination normal   Filament test normal sensation with micro filament   Pulse DP: 2+ (normal)   Deformities: None       An electronic signature was used to authenticate this note.   -- Eliza Ramirez MD

## 2022-10-28 ENCOUNTER — DOCUMENTATION ONLY (OUTPATIENT)
Dept: CASE MANAGEMENT | Age: 87
End: 2022-10-28

## 2022-10-28 NOTE — TELEPHONE ENCOUNTER
Ester was on phone with pt and I told cata to tell pt that miralax and lidocaine patches pt can buy over the counter and will have to pay out of pocket for them.

## 2022-10-28 NOTE — ACP (ADVANCE CARE PLANNING)
Advance Care Planning   Ambulatory ACP Specialist Patient Outreach    Date:  10/28/2022, 11/1/22    ACP Specialist:  Victorino Quinones RN    Outreach call to patient in follow-up to ACP Specialist referral from:    [x] PCP  [] Provider   [] Ambulatory Care Management [] Other     For:                  [x] Advance Directive Assistance              [] Complete Portable DNR order              [] Complete POST/MOST              [] Code Status Discussion             [] Discuss Goals of Care             [] Early ACP Decision-Making              [] Other (Specify)    Date Referral Received: 10/26/22    Today's Outreach:  [] First   [] Second  [x] Third       Third outreach made by: [x] Phone  [] Email / mail    [] MyChart     Intervention:  [x] Spoke with Patient   [] Left VM requesting return call      Outcome: RN received call from pt's son who requested changing appt for ACP conversation to 9:15AM  on 11/2/22 at Dr. Mayank Andrews office at [address] [place] [address].  RN agreed to the time change. Pt's son will be bringing pt to this appt. RN left message with Dr. Mayank Andrews office to advise of the time change and to request a private space for the conversation. Victorino Quinones RN      Next Step:   [x] ACP scheduled conversation  [] Outreach again in one week               [] Email / Mail ACP Info Sheets  [] Email / Mail Advance Directive   [] Closing referral.  Routing closure to referring provider/staff and to ACP Specialist . [] Closure letter mailed to patient with invitation to contact ACP Specialist if / when ready. Thank you for this referral.    Addendum: 11/1/22  RN confirmed available meeting place for ACP conversation with pt. RN called pt's son and advised him to accompany pt to [facility] clinic at [facility]. He was given the exact address of this office.   He stated he and pt will arrive around 9:30AM.  Victorino Quinones RN

## 2022-11-02 ENCOUNTER — DOCUMENTATION ONLY (OUTPATIENT)
Dept: CASE MANAGEMENT | Age: 87
End: 2022-11-02

## 2022-11-02 NOTE — ACP (ADVANCE CARE PLANNING)
Advance Care Planning     Advance Care Planning Clinical Specialist  Conversation Note      Date of ACP Conversation: 11/02/22    Conversation Conducted with:  Patient with Decision Making Capacity and his son, Julio Cesar Blas Clinical Specialist: Princess Mendoza RN    Health Care Decision Maker:    Current Designated Health Care Decision Maker:     Primary Decision Maker: Mary Peace - [phone]      Care Preferences    Hospitalization: \"If your health worsens and it becomes clear that your chance of recovery is unlikely, what would your preference be regarding hospitalization? \"    Choice:  Pt states he is unsure, depends on situation. []  The patient wants hospitalization  []  The patient prefers comfort-focused treatment without hospitalization. Ventilation: \"If you were in your present state of health and suddenly became very ill and were unable to breathe on your own, what would your preference be about the use of a ventilator (breathing machine) if it were available to you? \"      If patient would desire the use of a ventilator (breathing machine), answer \"yes\", if not \"no\":yes    \"If your health worsens and it becomes clear that your chance of recovery is unlikely, what would your preference be about the use of a ventilator (breathing machine) if it were available to you? \"     Would the patient desire the use of a ventilator (breathing machine)? YES      Resuscitation  \"CPR works best to restart the heart when there is a sudden event, like a heart attack, in someone who is otherwise healthy. Unfortunately, CPR does not typically restart the heart for people who have serious health conditions or who are very sick. \"    \"In the event your heart stopped as a result of an underlying serious health condition, would you want attempts to be made to restart your heart (answer \"yes\" for attempt to resuscitate) or would you prefer a natural death (answer \"no\" for do not attempt to resuscitate)? \" yes      [] Yes  [x] No   Educated Patient / Lawrnce Mater regarding differences between Advance Directives and portable DNR orders. Length of ACP Conversation in minutes:  40    Conversation Outcomes:  [x] ACP discussion completed  [] Existing advance directive reviewed with patient; no changes to patient's previously recorded wishes   [x] New Advance Directive completed   [] Portable Do Not Resuscitate prepared for Provider review and signature  [] POLST/POST/MOLST/MOST prepared for Provider review and signature      Follow-up plan:    [] Schedule follow-up conversation to continue planning  [] Referred individual to Provider for additional questions/concerns   [x] Advised patient/agent/surrogate to review completed ACP document and update if needed with changes in condition, patient preferences or care setting     [x] This note routed to one or more involved healthcare providers    RN met with pt and his son in person at the [address] [place] [address]. RN reviewed above questions with pt and his answers are indicated. An Advance Medical Directive was completed per pt's preferences. The AMD was initialed and signed where required by pt in the presence of two witnesses who also signed the document. Pt was given the original and two copies of the AMD.  One copy was scanned to pt's chart.  Zach Miller RN

## 2022-11-10 DIAGNOSIS — M25.551 RIGHT HIP PAIN: ICD-10-CM

## 2022-11-10 RX ORDER — MELOXICAM 15 MG/1
TABLET ORAL
Qty: 30 TABLET | Refills: 0 | Status: SHIPPED | OUTPATIENT
Start: 2022-11-10

## 2022-11-19 DIAGNOSIS — E78.2 MIXED HYPERLIPIDEMIA: ICD-10-CM

## 2022-11-19 RX ORDER — PRAVASTATIN SODIUM 20 MG/1
TABLET ORAL
Qty: 90 TABLET | Refills: 0 | Status: SHIPPED | OUTPATIENT
Start: 2022-11-19

## 2022-12-04 DIAGNOSIS — M25.551 RIGHT HIP PAIN: ICD-10-CM

## 2022-12-04 RX ORDER — MELOXICAM 15 MG/1
TABLET ORAL
Qty: 30 TABLET | Refills: 0 | Status: SHIPPED | OUTPATIENT
Start: 2022-12-04

## 2022-12-28 ENCOUNTER — TELEPHONE (OUTPATIENT)
Dept: PRIMARY CARE CLINIC | Age: 87
End: 2022-12-28

## 2022-12-28 NOTE — TELEPHONE ENCOUNTER
Spoke to son Jimmy Middleton and told him I'm calling from Dr. Chicho Romero office and I got a message about his dad wanting a referral for left knee pain and son said yes. I told son that Dr. Yvrose Calhoun is not here and will be back Tuesday 01/03/2023 and I can send her a message to her but she wont see it until Tuesday 01/03/2023 asking for a referral and once the referral is placed I will call you back. He said ok, thank you.

## 2023-01-03 NOTE — TELEPHONE ENCOUNTER
Spoke to son Tamara Steinberg and told him Dr. Soila Ceballos wanted his father to make an madeline for the knee pain son said ok and I asked if I could make the madeline now with him and if he could let his father know and he said yes.  He is on the schedule for 1/4/2023 at 12:15pm.

## 2023-01-18 ENCOUNTER — OFFICE VISIT (OUTPATIENT)
Dept: PRIMARY CARE CLINIC | Age: 88
End: 2023-01-18
Payer: MEDICARE

## 2023-01-18 VITALS
SYSTOLIC BLOOD PRESSURE: 133 MMHG | BODY MASS INDEX: 22.72 KG/M2 | WEIGHT: 153.4 LBS | TEMPERATURE: 97.1 F | HEART RATE: 55 BPM | RESPIRATION RATE: 18 BRPM | HEIGHT: 69 IN | OXYGEN SATURATION: 100 % | DIASTOLIC BLOOD PRESSURE: 71 MMHG

## 2023-01-18 DIAGNOSIS — M48.061 DEGENERATIVE LUMBAR SPINAL STENOSIS: ICD-10-CM

## 2023-01-18 DIAGNOSIS — E11.9 CONTROLLED TYPE 2 DIABETES MELLITUS WITHOUT COMPLICATION, WITHOUT LONG-TERM CURRENT USE OF INSULIN (HCC): Primary | ICD-10-CM

## 2023-01-18 DIAGNOSIS — G89.29 CHRONIC PAIN OF RIGHT KNEE: ICD-10-CM

## 2023-01-18 DIAGNOSIS — E78.2 MIXED HYPERLIPIDEMIA: ICD-10-CM

## 2023-01-18 DIAGNOSIS — M25.561 CHRONIC PAIN OF RIGHT KNEE: ICD-10-CM

## 2023-01-18 PROCEDURE — G8536 NO DOC ELDER MAL SCRN: HCPCS | Performed by: INTERNAL MEDICINE

## 2023-01-18 PROCEDURE — 99214 OFFICE O/P EST MOD 30 MIN: CPT | Performed by: INTERNAL MEDICINE

## 2023-01-18 PROCEDURE — G8432 DEP SCR NOT DOC, RNG: HCPCS | Performed by: INTERNAL MEDICINE

## 2023-01-18 PROCEDURE — 1123F ACP DISCUSS/DSCN MKR DOCD: CPT | Performed by: INTERNAL MEDICINE

## 2023-01-18 PROCEDURE — G8427 DOCREV CUR MEDS BY ELIG CLIN: HCPCS | Performed by: INTERNAL MEDICINE

## 2023-01-18 PROCEDURE — G8420 CALC BMI NORM PARAMETERS: HCPCS | Performed by: INTERNAL MEDICINE

## 2023-01-18 PROCEDURE — 1101F PT FALLS ASSESS-DOCD LE1/YR: CPT | Performed by: INTERNAL MEDICINE

## 2023-01-18 NOTE — PROGRESS NOTES
Written by Ursula Dolan, as dictated by Cain Infante MD.   ASSESSMENT and PLAN  Diagnoses and all orders for this visit:    1. Controlled type 2 diabetes mellitus without complication, without long-term current use of insulin ([address]  Continue with metformin 500mg daily. Recheck A1c and microalbumin.  -     MICROALBUMIN, UR, RAND W/ MICROALB/CREAT RATIO; Future  -     HEMOGLOBIN A1C WITH EAG; Future    2. Chronic pain of right knee  X-ray of knee at [facility] was normal. I recommended starting PT (referral sent). -     REFERRAL TO PHYSICAL THERAPY    3. Mixed hyperlipidemia  Continue with pravastatin 20mg daily. Recheck CMP, CBC, and lipid panel.   -     METABOLIC PANEL, COMPREHENSIVE; Future  -     CBC W/O DIFF; Future  -     LIPID PANEL; Future    4. Degenerative lumbar spinal stenosis  I gave him information on Gurvinder Tee's surgery options to get a second opinion and referred him to spinal surgery.   -     REFERRAL TO Daquan Chang is a 80 y.o. male. Here today to discuss knee pain. He is accompanied by his son. He is not fasting today. Pt c/o right knee pain. Pain is affecting his walking. He reports he had x-ray of knee at [facility] that was normal.   He has been seeing doctor at [facility] for severe lumbar stenosis causing neurogenic claudication. He is considering surgery, but is concerned about complications as a result of the surgery. It is unclear whether surgery would help his knee pain. He would like to get a second opinion. Pt is taking pravastatin 20mg daily. Pt is taking metformin 500mg daily.      Patient Active Problem List   Diagnosis Code    Coronary atherosclerosis of native coronary artery I25.10    Hyperlipidemia E78.5    Benign prostatic hyperplasia with weak urinary stream N40.1, R39.12    Well controlled diabetes mellitus ([address] E11.9    Neuroforaminal stenosis of lumbar spine M48.061    Essential hypertension I10        Current Outpatient Medications on File Prior to Visit   Medication Sig Dispense Refill    thiamine HCl (VITAMIN B-1 PO) Take  by mouth. ascorbic acid (VITAMIN C PO) Take  by mouth.      pravastatin (PRAVACHOL) 20 mg tablet TAKE 1 TABLET BY MOUTH EVERYDAY AT BEDTIME 90 Tablet 0    lidocaine (LIDODERM) 5 % Apply patch to the affected area for 12 hours a day and remove for 12 hours a day. 5 Patch 2    metFORMIN (GLUCOPHAGE) 500 mg tablet TAKE 1 TABLET BY MOUTH EVERY DAY WITH BREAKFAST 90 Tablet 0    losartan (COZAAR) 25 mg tablet TAKE 1 TABLET BY MOUTH EVERY DAY 90 Tablet 0    VITAMIN D3 2,000 unit cap       aspirin delayed-release 81 mg tablet Take 81 mg by mouth daily. meloxicam (MOBIC) 15 mg tablet TAKE 1 TABLET BY MOUTH EVERY DAY (Patient not taking: Reported on 2023) 30 Tablet 0    meclizine (ANTIVERT) 12.5 mg tablet TAKE 1 TABLET BY MOUTH THREE TIMES A DAY AS NEEDED FOR DIZZINESS FOR UP TO 10 DAYS (Patient not taking: Reported on 2023) 30 Tab 0     No current facility-administered medications on file prior to visit. No Known Allergies    Past Medical History:   Diagnosis Date    BPH (benign prostatic hyperplasia)     Hyperlipemia     Hypertension     RA (rheumatoid arthritis) ([facility]        History reviewed. No pertinent surgical history.     Family History   Family history unknown: Yes       Social History     Socioeconomic History    Marital status:      Spouse name: Not on file    Number of children: Not on file    Years of education: Not on file    Highest education level: Not on file   Occupational History    Not on file   Tobacco Use    Smoking status: Former     Packs/day: 1.00     Years: 15.00     Pack years: 15.00     Types: Cigarettes     Quit date: 3/19/1975     Years since quittin.8    Smokeless tobacco: Never   Vaping Use    Vaping Use: Not on file   Substance and Sexual Activity    Alcohol use: No    Drug use: No    Sexual activity: Yes     Partners: Female   Other Topics Concern    Not on file   Social History Narrative    Not on file     Social Determinants of Health     Financial Resource Strain: Low Risk     Difficulty of Paying Living Expenses: Not hard at all   Food Insecurity: Unknown    Worried About [address] in the Last Year: Never true    Ran Out of Food in the Last Year: Not on file   Transportation Needs: Not on file   Physical Activity: Not on file   Stress: Not on file   Social Connections: Not on file   Intimate Partner Violence: Not on file   Housing Stability: Not on file       No visits with results within 3 Month(s) from this visit. Latest known visit with results is:   Office Visit on 05/16/2022   Component Date Value Ref Range Status    Sed rate (ESR) 05/16/2022 2  0 - 30 mm/hr Final    C-Reactive Protein, Cardiac 05/16/2022 0.31  0.00 - 3.00 mg/L Final    Comment:          Relative Risk for Future Cardiovascular Event                               Low                 <1.00                               Average       1.00 - 3.00                               High                >3.00         Review of Systems   Constitutional:  Negative for malaise/fatigue and weight loss. HENT:  Negative for congestion and sore throat. Eyes:  Negative for blurred vision. Respiratory:  Negative for cough and shortness of breath. Cardiovascular:  Negative for chest pain and leg swelling. Gastrointestinal:  Negative for constipation and heartburn. Genitourinary:  Negative for frequency and urgency. Musculoskeletal:  Positive for back pain and joint pain (right knee). Negative for myalgias. Neurological:  Negative for dizziness and headaches. Psychiatric/Behavioral:  Negative for depression. The patient is not nervous/anxious and does not have insomnia.     Visit Vitals  /71 (BP 1 Location: Left upper arm, BP Patient Position: Sitting)   Pulse (!) 55   Temp 97.1 °F (36.2 °C) (Temporal)   Resp 18   Ht 5' 9\" (1.753 m)   Wt 153 lb 6.4 oz (69.6 kg) SpO2 100%   BMI 22.65 kg/m²   '  Physical Exam  Vitals and nursing note reviewed. Constitutional:       General: He is not in acute distress. Appearance: Normal appearance. He is not diaphoretic. HENT:      Right Ear: External ear normal.      Left Ear: External ear normal.   Eyes:      General: No scleral icterus. Right eye: No discharge. Left eye: No discharge. Extraocular Movements: Extraocular movements intact. Conjunctiva/sclera: Conjunctivae normal.   Cardiovascular:      Rate and Rhythm: Normal rate and regular rhythm. Pulmonary:      Effort: Pulmonary effort is normal.      Breath sounds: Normal breath sounds. No wheezing. Abdominal:      General: Bowel sounds are normal.      Palpations: Abdomen is soft. Musculoskeletal:      Cervical back: Normal range of motion and neck supple. Lymphadenopathy:      Cervical: No cervical adenopathy. Neurological:      Mental Status: He is alert and oriented to person, place, and time.    Psychiatric:         Mood and Affect: Mood and affect normal.

## 2023-01-18 NOTE — PROGRESS NOTES
1. \"Have you been to the ER, urgent care clinic since your last visit? Hospitalized since your last visit? \" Yes When: ER had covid and [facility]     2. \"Have you seen or consulted any other health care providers outside of the [address] [place] [address] since your last visit? \" No     3. For patients aged 39-69NA - based on age: Has the patient had a colonoscopy / FIT/ Cologuard? Yes - no Care Gap present      . Chief Complaint   Patient presents with    Knee Pain     Right knee pain.

## 2023-01-21 DIAGNOSIS — R73.03 PREDIABETES: ICD-10-CM

## 2023-01-22 RX ORDER — METFORMIN HYDROCHLORIDE 500 MG/1
TABLET ORAL
Qty: 90 TABLET | Refills: 0 | Status: SHIPPED | OUTPATIENT
Start: 2023-01-22

## 2023-03-14 DIAGNOSIS — E78.2 MIXED HYPERLIPIDEMIA: ICD-10-CM

## 2023-03-14 DIAGNOSIS — M25.551 RIGHT HIP PAIN: ICD-10-CM

## 2023-03-14 RX ORDER — PRAVASTATIN SODIUM 20 MG/1
TABLET ORAL
Qty: 90 TABLET | Refills: 0 | Status: SHIPPED | OUTPATIENT
Start: 2023-03-14

## 2023-03-14 RX ORDER — MELOXICAM 15 MG/1
TABLET ORAL
Qty: 30 TABLET | Refills: 0 | Status: SHIPPED | OUTPATIENT
Start: 2023-03-14

## 2023-04-19 ENCOUNTER — OFFICE VISIT (OUTPATIENT)
Dept: PRIMARY CARE CLINIC | Age: 88
End: 2023-04-19
Payer: MEDICARE

## 2023-04-19 ENCOUNTER — HOSPITAL ENCOUNTER (OUTPATIENT)
Dept: GENERAL RADIOLOGY | Age: 88
Discharge: HOME | End: 2023-04-19
Attending: NURSE PRACTITIONER
Payer: MEDICARE

## 2023-04-19 VITALS
OXYGEN SATURATION: 98 % | WEIGHT: 154 LBS | SYSTOLIC BLOOD PRESSURE: 127 MMHG | BODY MASS INDEX: 22.81 KG/M2 | RESPIRATION RATE: 16 BRPM | DIASTOLIC BLOOD PRESSURE: 64 MMHG | HEIGHT: 69 IN | HEART RATE: 60 BPM | TEMPERATURE: 97.8 F

## 2023-04-19 DIAGNOSIS — M25.551 ACUTE RIGHT HIP PAIN: ICD-10-CM

## 2023-04-19 DIAGNOSIS — M25.562 CHRONIC PAIN OF BOTH KNEES: ICD-10-CM

## 2023-04-19 DIAGNOSIS — M48.061 DEGENERATIVE LUMBAR SPINAL STENOSIS: Primary | ICD-10-CM

## 2023-04-19 DIAGNOSIS — M25.651 DECREASED RANGE OF RIGHT HIP MOVEMENT: ICD-10-CM

## 2023-04-19 DIAGNOSIS — M25.561 CHRONIC PAIN OF BOTH KNEES: ICD-10-CM

## 2023-04-19 DIAGNOSIS — G89.29 CHRONIC PAIN OF BOTH KNEES: ICD-10-CM

## 2023-04-19 PROCEDURE — 1101F PT FALLS ASSESS-DOCD LE1/YR: CPT | Performed by: NURSE PRACTITIONER

## 2023-04-19 PROCEDURE — G8427 DOCREV CUR MEDS BY ELIG CLIN: HCPCS | Performed by: NURSE PRACTITIONER

## 2023-04-19 PROCEDURE — G8420 CALC BMI NORM PARAMETERS: HCPCS | Performed by: NURSE PRACTITIONER

## 2023-04-19 PROCEDURE — G8432 DEP SCR NOT DOC, RNG: HCPCS | Performed by: NURSE PRACTITIONER

## 2023-04-19 PROCEDURE — 1123F ACP DISCUSS/DSCN MKR DOCD: CPT | Performed by: NURSE PRACTITIONER

## 2023-04-19 PROCEDURE — G8536 NO DOC ELDER MAL SCRN: HCPCS | Performed by: NURSE PRACTITIONER

## 2023-04-19 PROCEDURE — 73502 X-RAY EXAM HIP UNI 2-3 VIEWS: CPT

## 2023-04-19 PROCEDURE — 99214 OFFICE O/P EST MOD 30 MIN: CPT | Performed by: NURSE PRACTITIONER

## 2023-04-19 RX ORDER — DICLOFENAC SODIUM 25 MG/1
25 TABLET, DELAYED RELEASE ORAL
Qty: 28 TABLET | Refills: 0 | Status: SHIPPED | OUTPATIENT
Start: 2023-04-19

## 2023-04-19 RX ORDER — DICLOFENAC SODIUM 10 MG/G
2 GEL TOPICAL
Qty: 50 G | Refills: 0 | Status: SHIPPED | OUTPATIENT
Start: 2023-04-19

## 2023-04-19 RX ORDER — METHYLPREDNISOLONE 4 MG/1
TABLET ORAL
Qty: 1 DOSE PACK | Refills: 0 | Status: SHIPPED | OUTPATIENT
Start: 2023-04-19 | End: 2023-04-25

## 2023-04-19 NOTE — PROGRESS NOTES
[facility]   [address], [address] [facility]  P: [phone]  F: [phone]    SUBJECTIVE     HPI:     Nallely Millan is a 80 y.o. male who is seen in the clinic for   Chief Complaint   Patient presents with    Pelvic Pain     Started three days ago pt states that left side of pelvis had kept him up due to pain- pt denies any falls or injuries-       The patient with a PMH of DM II, CAD/HLD, HTN, BPH, Lumbar Spinal Stenosis/DDD, and Chronic Right Knee Pain presents today with complaints of R hip pain x3 days which has been waking him up at night. He denies recently falling, striking the hip, or having any other recent injuries inciting the pain. He is able to point to a specific area on the iliac crest where the pain is the worst and states that it is constant and not exacerbated by movement. He has meloxicam as needed for pain but has not taken any since Monday. He has stopped taking it because it does not help his pain. He has lumbar spinal stenosis and chronic right knee pain, and he states that these have not gotten acutely worse in the past few days. He has tried physical therapy and steroid injections in the past and did not see relief from them. Interpretive services are being provided by the patient's son. The patient's PCP is Dr. Tyrell Worthington. Patient Active Problem List    Diagnosis    Neuroforaminal stenosis of lumbar spine    Essential hypertension    Well controlled diabetes mellitus ([address]    Benign prostatic hyperplasia with weak urinary stream    Hyperlipidemia    Coronary atherosclerosis of native coronary artery        Past Medical History:   Diagnosis Date    BPH (benign prostatic hyperplasia)     Hyperlipemia     Hypertension     RA (rheumatoid arthritis) ([address]      History reviewed. No pertinent surgical history.   Social History     Socioeconomic History    Marital status:      Spouse name: Not on file    Number of children: Not on file Years of education: Not on file    Highest education level: Not on file   Occupational History    Not on file   Tobacco Use    Smoking status: Former     Packs/day: 1.00     Years: 15.00     Pack years: 15.00     Types: Cigarettes     Quit date: 3/19/1975     Years since quittin.1    Smokeless tobacco: Never   Vaping Use    Vaping Use: Not on file   Substance and Sexual Activity    Alcohol use: No    Drug use: No    Sexual activity: Yes     Partners: Female   Other Topics Concern    Not on file   Social History Narrative    Not on file     Social Determinants of Health     Financial Resource Strain: Low Risk     Difficulty of Paying Living Expenses: Not hard at all   Food Insecurity: Unknown    Worried About Running Out of Food in the Last Year: Never true    Ran Out of Food in the Last Year: Not on file   Transportation Needs: Not on file   Physical Activity: Not on file   Stress: Not on file   Social Connections: Not on file   Intimate Partner Violence: Not on file   Housing Stability: Not on file     Family History   Family history unknown: Yes     Immunization History   Administered Date(s) Administered    COVID-19, MODERNA BLUE border, Primary or Immunocompromised, (age 18y+), IM, 100 mcg/0.5mL 2021, 2021, 10/27/2021    COVID-19, MODERNA Booster BLUE border, (age 18y+), IM, 50mcg/0.25mL 2022    Influenza High Dose Vaccine PF 10/26/2022    Influenza, FLUAD, (age 72 y+), Adjuvanted 09/15/2020, 12/15/2021, 10/26/2022    Pneumococcal Conjugate PCV20, PF (Prevnar 20) 2022    Pneumococcal Polysaccharide (PPSV-23) 2019    Tdap 2011      No Known Allergies    No visits with results within 3 Month(s) from this visit.    Latest known visit with results is:   Office Visit on 2023   Component Date Value Ref Range Status    Glucose 2023 105 (H)  70 - 99 mg/dL Final    BUN 2023 18  8 - 27 mg/dL Final    Creatinine 2023 0.98  0.76 - 1.27 mg/dL Final    eGFR 01/20/2023 74  >59 mL/min/1.73 Final    BUN/Creatinine ratio 01/20/2023 18  10 - 24 Final    Sodium 01/20/2023 142  134 - 144 mmol/L Final    Potassium 01/20/2023 4.7  3.5 - 5.2 mmol/L Final    Chloride 01/20/2023 103  96 - 106 mmol/L Final    CO2 01/20/2023 21  20 - 29 mmol/L Final    Calcium 01/20/2023 9.5  8.6 - 10.2 mg/dL Final    Protein, total 01/20/2023 7.0  6.0 - 8.5 g/dL Final    Albumin 01/20/2023 4.5  3.6 - 4.6 g/dL Final    GLOBULIN, TOTAL 01/20/2023 2.5  1.5 - 4.5 g/dL Final    A-G Ratio 01/20/2023 1.8  1.2 - 2.2 Final    Bilirubin, total 01/20/2023 0.5  0.0 - 1.2 mg/dL Final    Alk.  phosphatase 01/20/2023 88  44 - 121 IU/L Final    AST (SGOT) 01/20/2023 18  0 - 40 IU/L Final    ALT (SGPT) 01/20/2023 18  0 - 44 IU/L Final    WBC 01/20/2023 7.1  3.4 - 10.8 x10E3/uL Final    RBC 01/20/2023 4.54  4.14 - 5.80 x10E6/uL Final    HGB 01/20/2023 14.2  13.0 - 17.7 g/dL Final    HCT 01/20/2023 41.4  37.5 - 51.0 % Final    MCV 01/20/2023 91  79 - 97 fL Final    MCH 01/20/2023 31.3  26.6 - 33.0 pg Final    MCHC 01/20/2023 34.3  31.5 - 35.7 g/dL Final    RDW 01/20/2023 13.4  11.6 - 15.4 % Final    PLATELET 45/05/9918 182  150 - 450 x10E3/uL Final    Cholesterol, total 01/20/2023 156  100 - 199 mg/dL Final    Triglyceride 01/20/2023 84  0 - 149 mg/dL Final    HDL Cholesterol 01/20/2023 62  >39 mg/dL Final    VLDL, calculated 01/20/2023 16  5 - 40 mg/dL Final    LDL, calculated 01/20/2023 78  0 - 99 mg/dL Final    Creatinine, urine random 01/20/2023 69.7  Not Estab. mg/dL Final    Microalbumin, urine 01/20/2023 <3.0  Not Estab. ug/mL Final    Microalb/Creat ratio (ug/mg creat.) 01/20/2023 <4  0 - 29 mg/g creat Final    Comment:                        Normal:                0 -  29                         Moderately increased: 30 - 300                         Severely increased:       >300      Hemoglobin A1c 01/20/2023 5.6  4.8 - 5.6 % Final    Comment:          Prediabetes: 5.7 - 6.4           Diabetes: >6.4 Glycemic control for adults with diabetes: <7.0      Estimated average glucose 01/20/2023 114  mg/dL Final      MRI BRAIN WO CONT  Narrative: EXAM:  MRI BRAIN WO CONT    INDICATION:    New onset headache. COMPARISON:  MRI brain 4/27/2013. CONTRAST: None. TECHNIQUE:    Multiplanar multisequence acquisition without contrast of the brain. FINDINGS:  Stable 1.2 x 1.1 x 1.1 cm calcified extra-axial dural based mass along the right  superior frontal convexity, consistent with meningioma. Stable few scattered  periventricular and deep white matter T2/FLAIR hyperintensities, consistent with  mild chronic microvascular ischemic disease. Unchanged mild generalized  parenchymal volume loss with commensurate dilation of the sulci and ventricular  system. There is no acute infarct or hemorrhage. There is no cerebellar  tonsillar herniation. Expected arterial flow-voids are present. The paranasal sinuses, mastoid air cells, and middle ears are clear. The orbital  contents are within normal limits. No significant osseous or scalp lesions are  identified. Facet arthropathy noted in the upper cervical spine. Impression: 1. Stable 1.2 cm right superior frontal calcified meningioma. 2. No acute intracranial abnormality. Unchanged mild generalized parenchymal  volume loss and mild chronic microvascular ischemic disease. Current Outpatient Medications   Medication Sig Dispense Refill    methylPREDNISolone (MEDROL DOSEPACK) 4 mg tablet use as directed 1 Dose Pack 0    diclofenac EC (VOLTAREN) 25 mg EC tablet Take 1 Tablet by mouth two (2) times daily as needed for Pain. 28 Tablet 0    diclofenac (VOLTAREN) 1 % gel Apply 2 g to affected area four (4) times daily as needed for Pain.  50 g 0    pravastatin (PRAVACHOL) 20 mg tablet TAKE 1 TABLET BY MOUTH EVERYDAY AT BEDTIME 90 Tablet 0    metFORMIN (GLUCOPHAGE) 500 mg tablet TAKE 1 TABLET BY MOUTH EVERY DAY WITH BREAKFAST 90 Tablet 0    thiamine HCl (VITAMIN B-1 PO) Take by mouth. ascorbic acid (VITAMIN C PO) Take  by mouth.      losartan (COZAAR) 25 mg tablet TAKE 1 TABLET BY MOUTH EVERY DAY 90 Tablet 0    meclizine (ANTIVERT) 12.5 mg tablet TAKE 1 TABLET BY MOUTH THREE TIMES A DAY AS NEEDED FOR DIZZINESS FOR UP TO 10 DAYS 30 Tab 0    VITAMIN D3 2,000 unit cap       aspirin delayed-release 81 mg tablet Take 1 Tablet by mouth daily. lidocaine (LIDODERM) 5 % Apply patch to the affected area for 12 hours a day and remove for 12 hours a day. (Patient not taking: Reported on 4/19/2023) 5 Patch 2           The past medical history, past surgical history, and family history were reviewed and updated in the medical record. Lab values/Imaging were reviewed. The medications were reviewed and updated in the medical record. Immunizations were reviewed and updated in the medical record. All relevant preventative screenings reviewed and updated in the medical record. REVIEW OF SYSTEMS   Review of Systems   Musculoskeletal:  Positive for back pain and joint pain. Negative for falls. Neurological:  Negative for tingling, sensory change and focal weakness. PHYSICAL EXAM   /64 (BP 1 Location: Left upper arm, BP Patient Position: Sitting, BP Cuff Size: Adult)   Pulse 60   Temp 97.8 °F (36.6 °C) (Temporal)   Resp 16   Ht 5' 9\" (1.753 m)   Wt 154 lb (69.9 kg)   SpO2 98%   BMI 22.74 kg/m²      Physical Exam  Musculoskeletal:      Lumbar back: No deformity or spasms. Decreased range of motion. Positive right straight leg raise test and positive left straight leg raise test.      Right hip: Tenderness present. No deformity or crepitus. Decreased range of motion. ASSESSMENT/ PLAN   Below is the assessment and plan developed based on review of pertinent history, physical exam, labs, studies, and medications.     1. Degenerative lumbar spinal stenosis  -     REFERRAL TO ORTHOPEDICS  -     methylPREDNISolone (MEDROL DOSEPACK) 4 mg tablet; use as directed, Normal, Disp-1 Dose Pack, R-0  -     diclofenac EC (VOLTAREN) 25 mg EC tablet; Take 1 Tablet by mouth two (2) times daily as needed for Pain., Normal, Disp-28 Tablet, R-0Appointment needed for future refills  -     diclofenac (VOLTAREN) 1 % gel; Apply 2 g to affected area four (4) times daily as needed for Pain., Normal, Disp-50 g, R-0  2. Acute right hip pain  -     REFERRAL TO ORTHOPEDICS  -     XR HIP RT W OR WO PELV 2-3 VWS; Future  -     methylPREDNISolone (MEDROL DOSEPACK) 4 mg tablet; use as directed, Normal, Disp-1 Dose Pack, R-0  -     diclofenac EC (VOLTAREN) 25 mg EC tablet; Take 1 Tablet by mouth two (2) times daily as needed for Pain., Normal, Disp-28 Tablet, R-0Appointment needed for future refills  -     diclofenac (VOLTAREN) 1 % gel; Apply 2 g to affected area four (4) times daily as needed for Pain., Normal, Disp-50 g, R-0  3. Decreased range of right hip movement  -     REFERRAL TO ORTHOPEDICS  -     XR HIP RT W OR WO PELV 2-3 VWS; Future  -     methylPREDNISolone (MEDROL DOSEPACK) 4 mg tablet; use as directed, Normal, Disp-1 Dose Pack, R-0  4. Chronic pain of both knees  -     REFERRAL TO ORTHOPEDICS  -     methylPREDNISolone (MEDROL DOSEPACK) 4 mg tablet; use as directed, Normal, Disp-1 Dose Pack, R-0  -     diclofenac EC (VOLTAREN) 25 mg EC tablet; Take 1 Tablet by mouth two (2) times daily as needed for Pain., Normal, Disp-28 Tablet, R-0Appointment needed for future refills  -     diclofenac (VOLTAREN) 1 % gel; Apply 2 g to affected area four (4) times daily as needed for Pain., Normal, Disp-50 g, R-0    Discussed with patient that his hip pain is likely arthritis given his history of degenerative disc disease and chronic knee pain as well as the lack of recent falls or injuries. I ordered a hip x-ray to evaluate for arthritis. The patient's diabetes is well-controlled with a HbA1c of 5.6 on 1/20/23, so a Medrol dosepack was prescribed for acute relief of his pain and inflammation.  Instructed patient to check his blood sugar daily while on Medrol as it can raise blood sugar. I also prescribed oral diclofenac tablets and Voltaren gel which he can use instead of the meloxicam for his hip and knee pain. I provided a referral to orthopedics and instructed the patient to follow up with them if his hip pain does not improve with the above measures. This note was scribed by ERNESTO Darling with direct supervision of Kel Gilmore NP. Medication risks/benefits/alternatives discussed with patient. Patient was given an after visit summary which includes diagnoses, current medications, & vitals. Discussed patient instructions and advised to read to all patient instructions regarding care. Patient expressed understanding with the diagnosis and plan.        Kel Gilmore NP  4/19/2023

## 2023-04-19 NOTE — PROGRESS NOTES
[facility]   [facility], [address] [facility]  P: [phone]  F: [phone]    SUBJECTIVE     HPI:     Shira Mathew is a 80 y.o. male who is seen in the clinic for No chief complaint on file. The patient with a PMH of DM II, CAD/HLD, HTN, BPH, Lumbar Spinal Stenosis/DDD, and Chronic Right Knee Pain presents today with complaints of          The patient's PCP is Dr. Jia Martinez. Patient Active Problem List    Diagnosis    Neuroforaminal stenosis of lumbar spine    Essential hypertension    Well controlled diabetes mellitus ([facility]    Benign prostatic hyperplasia with weak urinary stream    Hyperlipidemia    Coronary atherosclerosis of native coronary artery        Past Medical History:   Diagnosis Date    BPH (benign prostatic hyperplasia)     Hyperlipemia     Hypertension     RA (rheumatoid arthritis) ([facility]      History reviewed. No pertinent surgical history.   Social History     Socioeconomic History    Marital status:      Spouse name: Not on file    Number of children: Not on file    Years of education: Not on file    Highest education level: Not on file   Occupational History    Not on file   Tobacco Use    Smoking status: Former     Packs/day: 1.00     Years: 15.00     Pack years: 15.00     Types: Cigarettes     Quit date: 3/19/1975     Years since quittin.1    Smokeless tobacco: Never   Vaping Use    Vaping Use: Not on file   Substance and Sexual Activity    Alcohol use: No    Drug use: No    Sexual activity: Yes     Partners: Female   Other Topics Concern    Not on file   Social History Narrative    Not on file     Social Determinants of Health     Financial Resource Strain: Low Risk     Difficulty of Paying Living Expenses: Not hard at all   Food Insecurity: Unknown    Worried About [address] in the Last Year: Never true    Fred of Food in the Last Year: Not on file   Transportation Needs: Not on file   Physical Activity: Not on file   Stress: Not on file   Social Connections: Not on file   Intimate Partner Violence: Not on file   Housing Stability: Not on file     Family History   Family history unknown: Yes     Immunization History   Administered Date(s) Administered    COVID-19, MODERNA CIERA border, Primary or Immunocompromised, (age 18y+), IM, 100 mcg/0.5mL 02/02/2021, 03/02/2021, 10/27/2021    COVID-19, MODERNA Booster BLUE border, (age 18y+), IM, 50mcg/0.25mL 07/29/2022    Influenza High Dose Vaccine PF 10/26/2022    Influenza, FLUAD, (age 72 y+), Adjuvanted 09/15/2020, 12/15/2021, 10/26/2022    Pneumococcal Conjugate PCV20, PF (Prevnar 20) 07/29/2022    Pneumococcal Polysaccharide (PPSV-23) 08/09/2019    Tdap 06/17/2011      No Known Allergies    No visits with results within 3 Month(s) from this visit. Latest known visit with results is:   Office Visit on 01/18/2023   Component Date Value Ref Range Status    Glucose 01/20/2023 105 (H)  70 - 99 mg/dL Final    BUN 01/20/2023 18  8 - 27 mg/dL Final    Creatinine 01/20/2023 0.98  0.76 - 1.27 mg/dL Final    eGFR 01/20/2023 74  >59 mL/min/1.73 Final    BUN/Creatinine ratio 01/20/2023 18  10 - 24 Final    Sodium 01/20/2023 142  134 - 144 mmol/L Final    Potassium 01/20/2023 4.7  3.5 - 5.2 mmol/L Final    Chloride 01/20/2023 103  96 - 106 mmol/L Final    CO2 01/20/2023 21  20 - 29 mmol/L Final    Calcium 01/20/2023 9.5  8.6 - 10.2 mg/dL Final    Protein, total 01/20/2023 7.0  6.0 - 8.5 g/dL Final    Albumin 01/20/2023 4.5  3.6 - 4.6 g/dL Final    GLOBULIN, TOTAL 01/20/2023 2.5  1.5 - 4.5 g/dL Final    A-G Ratio 01/20/2023 1.8  1.2 - 2.2 Final    Bilirubin, total 01/20/2023 0.5  0.0 - 1.2 mg/dL Final    Alk.  phosphatase 01/20/2023 88  44 - 121 IU/L Final    AST (SGOT) 01/20/2023 18  0 - 40 IU/L Final    ALT (SGPT) 01/20/2023 18  0 - 44 IU/L Final    WBC 01/20/2023 7.1  3.4 - 10.8 x10E3/uL Final    RBC 01/20/2023 4.54  4.14 - 5.80 x10E6/uL Final    HGB 01/20/2023 14.2  13.0 - 17.7 g/dL Final    HCT 01/20/2023 41.4  37.5 - 51.0 % Final    MCV 01/20/2023 91  79 - 97 fL Final    MCH 01/20/2023 31.3  26.6 - 33.0 pg Final    MCHC 01/20/2023 34.3  31.5 - 35.7 g/dL Final    RDW 01/20/2023 13.4  11.6 - 15.4 % Final    PLATELET 65/91/7749 499  150 - 450 x10E3/uL Final    Cholesterol, total 01/20/2023 156  100 - 199 mg/dL Final    Triglyceride 01/20/2023 84  0 - 149 mg/dL Final    HDL Cholesterol 01/20/2023 62  >39 mg/dL Final    VLDL, calculated 01/20/2023 16  5 - 40 mg/dL Final    LDL, calculated 01/20/2023 78  0 - 99 mg/dL Final    Creatinine, urine random 01/20/2023 69.7  Not Estab. mg/dL Final    Microalbumin, urine 01/20/2023 <3.0  Not Estab. ug/mL Final    Microalb/Creat ratio (ug/mg creat.) 01/20/2023 <4  0 - 29 mg/g creat Final    Comment:                        Normal:                0 -  29                         Moderately increased: 30 - 300                         Severely increased:       >300      Hemoglobin A1c 01/20/2023 5.6  4.8 - 5.6 % Final    Comment:          Prediabetes: 5.7 - 6.4           Diabetes: >6.4           Glycemic control for adults with diabetes: <7.0      Estimated average glucose 01/20/2023 114  mg/dL Final      MRI BRAIN WO CONT  Narrative: EXAM:  MRI BRAIN WO CONT    INDICATION:    New onset headache. COMPARISON:  MRI brain 4/27/2013. CONTRAST: None. TECHNIQUE:    Multiplanar multisequence acquisition without contrast of the brain. FINDINGS:  Stable 1.2 x 1.1 x 1.1 cm calcified extra-axial dural based mass along the right  superior frontal convexity, consistent with meningioma. Stable few scattered  periventricular and deep white matter T2/FLAIR hyperintensities, consistent with  mild chronic microvascular ischemic disease. Unchanged mild generalized  parenchymal volume loss with commensurate dilation of the sulci and ventricular  system. There is no acute infarct or hemorrhage. There is no cerebellar  tonsillar herniation.  Expected arterial flow-voids are present. The paranasal sinuses, mastoid air cells, and middle ears are clear. The orbital  contents are within normal limits. No significant osseous or scalp lesions are  identified. Facet arthropathy noted in the upper cervical spine. Impression: 1. Stable 1.2 cm right superior frontal calcified meningioma. 2. No acute intracranial abnormality. Unchanged mild generalized parenchymal  volume loss and mild chronic microvascular ischemic disease. Current Outpatient Medications   Medication Sig Dispense Refill    meloxicam (MOBIC) 15 mg tablet TAKE 1 TABLET BY MOUTH EVERY DAY 30 Tablet 0    pravastatin (PRAVACHOL) 20 mg tablet TAKE 1 TABLET BY MOUTH EVERYDAY AT BEDTIME 90 Tablet 0    metFORMIN (GLUCOPHAGE) 500 mg tablet TAKE 1 TABLET BY MOUTH EVERY DAY WITH BREAKFAST 90 Tablet 0    thiamine HCl (VITAMIN B-1 PO) Take  by mouth. ascorbic acid (VITAMIN C PO) Take  by mouth.      lidocaine (LIDODERM) 5 % Apply patch to the affected area for 12 hours a day and remove for 12 hours a day. 5 Patch 2    losartan (COZAAR) 25 mg tablet TAKE 1 TABLET BY MOUTH EVERY DAY 90 Tablet 0    meclizine (ANTIVERT) 12.5 mg tablet TAKE 1 TABLET BY MOUTH THREE TIMES A DAY AS NEEDED FOR DIZZINESS FOR UP TO 10 DAYS (Patient not taking: Reported on 1/18/2023) 30 Tab 0    VITAMIN D3 2,000 unit cap       aspirin delayed-release 81 mg tablet Take 81 mg by mouth daily. The past medical history, past surgical history, and family history were reviewed and updated in the medical record. Lab values/Imaging were reviewed. The medications were reviewed and updated in the medical record. Immunizations were reviewed and updated in the medical record. All relevant preventative screenings reviewed and updated in the medical record.   REVIEW OF SYSTEMS   ROS      PHYSICAL EXAM   Ht 5' 9\" (1.753 m)   BMI 22.65 kg/m²      Physical Exam       ASSESSMENT/ PLAN   Below is the assessment and plan developed based on review of pertinent history, physical exam, labs, studies, and medications. ***    {There are no diagnoses linked to this encounter. (Refresh or delete this SmartLink)}               ***    Disclaimer:  Advised patient to call back or return to office if symptoms worsen/change/persist.  Discussed expected course/resolution/complications of diagnosis in detail with patient. Medication risks/benefits/alternatives discussed with patient. Patient was given an after visit summary which includes diagnoses, current medications, & vitals. Discussed patient instructions and advised to read to all patient instructions regarding care. Patient expressed understanding with the diagnosis and plan.        Estuardo Mcgregor NP  4/19/2023

## 2023-04-19 NOTE — PROGRESS NOTES
No chief complaint on file. Health Maintenance Due   Topic    Shingles Vaccine (1 of 2)    DTaP/Tdap/Td series (2 - Td or Tdap)    COVID-19 Vaccine (5 - Booster for Moderna series)        1. \"Have you been to the ER, urgent care clinic since your last visit? Hospitalized since your last visit? \" No    2. \"Have you seen or consulted any other health care providers outside of the [address] [place] [address] since your last visit? \" No     3. For patients aged 39-70: Has the patient had a colonoscopy / FIT/ Cologuard? NA - based on age      If the patient is female:    4. For patients aged 41-77: Has the patient had a mammogram within the past 2 years? NA - based on age or sex      11. For patients aged 21-65: Has the patient had a pap smear? NA - based on age or sex     There were no vitals taken for this visit.

## 2023-05-19 DIAGNOSIS — R73.03 PREDIABETES: ICD-10-CM

## 2023-05-30 DIAGNOSIS — M48.061 SPINAL STENOSIS, LUMBAR REGION WITHOUT NEUROGENIC CLAUDICATION: ICD-10-CM

## 2023-05-30 DIAGNOSIS — M25.551 PAIN IN RIGHT HIP: ICD-10-CM

## 2023-05-30 RX ORDER — CYCLOBENZAPRINE HCL 10 MG
TABLET ORAL
Qty: 30 TABLET | Refills: 0 | Status: SHIPPED | OUTPATIENT
Start: 2023-05-30

## 2023-06-21 ENCOUNTER — TELEPHONE (OUTPATIENT)
Dept: PRIMARY CARE CLINIC | Facility: CLINIC | Age: 88
End: 2023-06-21

## 2023-06-21 ENCOUNTER — OFFICE VISIT (OUTPATIENT)
Dept: PRIMARY CARE CLINIC | Facility: CLINIC | Age: 88
End: 2023-06-21
Payer: MEDICAID

## 2023-06-21 ENCOUNTER — HOSPITAL ENCOUNTER (OUTPATIENT)
Facility: HOSPITAL | Age: 88
Discharge: HOME | End: 2023-06-24
Attending: INTERNAL MEDICINE
Payer: MEDICARE

## 2023-06-21 VITALS
BODY MASS INDEX: 23.55 KG/M2 | HEART RATE: 53 BPM | OXYGEN SATURATION: 97 % | TEMPERATURE: 97.1 F | WEIGHT: 159 LBS | RESPIRATION RATE: 18 BRPM | DIASTOLIC BLOOD PRESSURE: 76 MMHG | HEIGHT: 69 IN | SYSTOLIC BLOOD PRESSURE: 133 MMHG

## 2023-06-21 DIAGNOSIS — M25.561 CHRONIC PAIN OF RIGHT KNEE: ICD-10-CM

## 2023-06-21 DIAGNOSIS — M25.561 CHRONIC PAIN OF RIGHT KNEE: Primary | ICD-10-CM

## 2023-06-21 DIAGNOSIS — M48.062 LUMBAR STENOSIS WITH NEUROGENIC CLAUDICATION: ICD-10-CM

## 2023-06-21 DIAGNOSIS — G89.29 CHRONIC PAIN OF RIGHT KNEE: Primary | ICD-10-CM

## 2023-06-21 DIAGNOSIS — G89.29 CHRONIC PAIN OF RIGHT KNEE: ICD-10-CM

## 2023-06-21 PROCEDURE — 1123F ACP DISCUSS/DSCN MKR DOCD: CPT | Performed by: INTERNAL MEDICINE

## 2023-06-21 PROCEDURE — 99214 OFFICE O/P EST MOD 30 MIN: CPT | Performed by: INTERNAL MEDICINE

## 2023-06-21 PROCEDURE — 73562 X-RAY EXAM OF KNEE 3: CPT

## 2023-06-21 RX ORDER — GABAPENTIN 300 MG/1
CAPSULE ORAL
COMMUNITY
Start: 2023-04-27

## 2023-06-21 SDOH — ECONOMIC STABILITY: FOOD INSECURITY: WITHIN THE PAST 12 MONTHS, YOU WORRIED THAT YOUR FOOD WOULD RUN OUT BEFORE YOU GOT MONEY TO BUY MORE.: PATIENT DECLINED

## 2023-06-21 SDOH — ECONOMIC STABILITY: FOOD INSECURITY: WITHIN THE PAST 12 MONTHS, THE FOOD YOU BOUGHT JUST DIDN'T LAST AND YOU DIDN'T HAVE MONEY TO GET MORE.: PATIENT DECLINED

## 2023-06-21 SDOH — ECONOMIC STABILITY: INCOME INSECURITY: HOW HARD IS IT FOR YOU TO PAY FOR THE VERY BASICS LIKE FOOD, HOUSING, MEDICAL CARE, AND HEATING?: PATIENT DECLINED

## 2023-06-21 SDOH — ECONOMIC STABILITY: HOUSING INSECURITY
IN THE LAST 12 MONTHS, WAS THERE A TIME WHEN YOU DID NOT HAVE A STEADY PLACE TO SLEEP OR SLEPT IN A SHELTER (INCLUDING NOW)?: NO

## 2023-06-21 ASSESSMENT — PATIENT HEALTH QUESTIONNAIRE - PHQ9
1. LITTLE INTEREST OR PLEASURE IN DOING THINGS: 0
SUM OF ALL RESPONSES TO PHQ9 QUESTIONS 1 & 2: 0
SUM OF ALL RESPONSES TO PHQ QUESTIONS 1-9: 0
2. FEELING DOWN, DEPRESSED OR HOPELESS: 0
SUM OF ALL RESPONSES TO PHQ QUESTIONS 1-9: 0

## 2023-06-21 ASSESSMENT — ENCOUNTER SYMPTOMS
COLOR CHANGE: 0
COUGH: 0
SORE THROAT: 0
CHEST TIGHTNESS: 0
DIARRHEA: 0
SHORTNESS OF BREATH: 0
EYE DISCHARGE: 0
ABDOMINAL PAIN: 0
RHINORRHEA: 0
CONSTIPATION: 0

## 2023-06-21 NOTE — PROGRESS NOTES
Dm Zarate (: 1934) is a 80 y.o. male, established patient, here for evaluation of the following chief complaint(s):  Knee Pain (Right knee)    ASSESSMENT/PLAN:  Below is the assessment and plan developed based on review of pertinent history, physical exam, labs, studies, and medications. 1. Chronic pain of right knee  -     XR KNEE RIGHT (3 VIEWS); Future  -     diclofenac sodium (VOLTAREN) 1 % GEL; Apply 2 g topically 4 times daily, Topical, 4 TIMES DAILY Starting 2023, Until 2023, For 30 days, Disp-100 g, R-0, Normal sent to pharmacy. I ordered a right knee X-ray. I prescribed Voltaren 1% gel to start applying 2 g topically QID. Potential side effects were discussed. 2. Lumbar stenosis with neurogenic claudication  -     External Referral To Pain Clinic  I referred him to the pain management clinic. SUBJECTIVE/OBJECTIVE:  HPI  The patient presents today for right knee pain. He was informed by the orthopedic surgeon that he would have to have back surgery but he is still contemplating to do it. He has been given two steroid injections in his lower back since the pain was in his legs. He states that the injections were not effective at giving him relief for his pain. He has been having pain in his right knee. He has pain in his left knee as well but it is worse in his right knee. He was given gabapentin 300 mg BID for his back and it has been reducing pain in his knees as well.      Patient Active Problem List   Diagnosis    Essential hypertension    Well controlled diabetes mellitus ([facility] [address]    Benign prostatic hyperplasia with weak urinary stream    Coronary atherosclerosis of native coronary artery    Hyperlipidemia    Neuroforaminal stenosis of lumbar spine        Current Outpatient Medications on File Prior to Visit   Medication Sig Dispense Refill    gabapentin (NEURONTIN) 300 MG capsule BEGIN 1 CAPSULE AT NIGHT FOR 3 NIGHTS, THEN INCREASE TO 2 CAPSULES AT NIGHT

## 2023-06-21 NOTE — TELEPHONE ENCOUNTER
Called son and left a message that we have his dad on the schedule today at 10:30am for [address] but [address] isn't due until after Oct 27th and to please call the office back.

## 2023-06-21 NOTE — PROGRESS NOTES
1. \"Have you been to the ER, urgent care clinic since your last visit? Hospitalized since your last visit? \" ER Yes    2. \"Have you seen or consulted any other health care providers outside of the [address] [place] [address] since your last visit? \" No     3. For patients aged 39-70: Has the patient had a colonoscopy / FIT/ Cologuard? NA - based on age    Chief Complaint   Patient presents with    Knee Pain     Right knee       Pt is ok with scribe.

## 2023-07-12 DIAGNOSIS — M25.551 PAIN IN RIGHT HIP: ICD-10-CM

## 2023-07-12 RX ORDER — MELOXICAM 15 MG/1
TABLET ORAL
Qty: 30 TABLET | Refills: 0 | Status: SHIPPED | OUTPATIENT
Start: 2023-07-12

## 2023-07-13 ENCOUNTER — TELEPHONE (OUTPATIENT)
Dept: PRIMARY CARE CLINIC | Facility: CLINIC | Age: 88
End: 2023-07-13

## 2023-07-13 NOTE — TELEPHONE ENCOUNTER
----- Message from Anniece Claude sent at 7/12/2023 12:49 PM EDT -----  Subject: Referral Request    Reason for referral request? leg and arm pain   Provider patient wants to be referred to(if known):     Provider Phone Number(if known):     Additional Information for Provider? was just seen in june   ---------------------------------------------------------------------------  --------------  Jak Hoosick Becca    [phone]; OK to leave message on voicemail  ---------------------------------------------------------------------------  --------------

## 2023-07-13 NOTE — TELEPHONE ENCOUNTER
Spoke to son and said we got a message that his father is having leg and arm, pain and he said yes. I asked if he has gone to physical therapy yet or pain management. He said no and I said I can give you the number to make an lamin because that's where he's going to have to go. Earline Lilly He asked if I can send him a Reale[employer] message and I said I will though his father amee. He said thank you very much.

## 2023-07-25 DIAGNOSIS — M25.551 PAIN IN RIGHT HIP: ICD-10-CM

## 2023-07-26 RX ORDER — MELOXICAM 15 MG/1
TABLET ORAL
Qty: 30 TABLET | Refills: 0 | Status: SHIPPED | OUTPATIENT
Start: 2023-07-26

## 2023-07-27 DIAGNOSIS — E11.9 DIABETES MELLITUS WITHOUT COMPLICATION (HCC): Primary | ICD-10-CM

## 2023-07-27 RX ORDER — GLUCOSAMINE HCL/CHONDROITIN SU 500-400 MG
CAPSULE ORAL
Qty: 100 STRIP | Refills: 5 | Status: SHIPPED | OUTPATIENT
Start: 2023-07-27

## 2023-08-25 DIAGNOSIS — R73.03 PREDIABETES: ICD-10-CM

## 2023-09-12 DIAGNOSIS — M48.061 SPINAL STENOSIS, LUMBAR REGION WITHOUT NEUROGENIC CLAUDICATION: ICD-10-CM

## 2023-09-12 DIAGNOSIS — M25.551 PAIN IN RIGHT HIP: ICD-10-CM

## 2023-09-12 RX ORDER — CYCLOBENZAPRINE HCL 10 MG
TABLET ORAL
Qty: 30 TABLET | Refills: 0 | Status: SHIPPED | OUTPATIENT
Start: 2023-09-12

## 2023-09-28 ENCOUNTER — TELEPHONE (OUTPATIENT)
Dept: PRIMARY CARE CLINIC | Facility: CLINIC | Age: 88
End: 2023-09-28

## 2023-09-28 NOTE — TELEPHONE ENCOUNTER
PT's son is requesting referral for Dermatology Clinic    [address] [facility] [address]  [address]  Phone number to the clinic : [phone]

## 2023-09-29 ENCOUNTER — TELEPHONE (OUTPATIENT)
Dept: PRIMARY CARE CLINIC | Facility: CLINIC | Age: 88
End: 2023-09-29

## 2023-09-29 DIAGNOSIS — L98.9 SKIN LESION: Primary | ICD-10-CM

## 2023-09-29 NOTE — TELEPHONE ENCOUNTER
Pt advised that he received a call from [place] and he is returning the call.  [phone] (T - PSR Float - 9.29.23)

## 2023-09-29 NOTE — TELEPHONE ENCOUNTER
Spoke to son and told him I fax the referral for his dad to there dermatologist at [facility]. He said thank you and asked if he could ask me another question and I said yes. He said his dad cannot use the stairs at his house because of his back and the son called his insurance [employer] and got a paper for an accommodation for Dr. Matias Eisenmenger to fill out for a stair lift chair. I said he can drop it off and we will take a look at it and fill it out and I will be in touch with son. He said ok, thank you.

## 2023-09-29 NOTE — TELEPHONE ENCOUNTER
Son called back and said his dad feels something under his skin on his face and is getting bigger. Son found this derma that takes insurance and would like referral placed for there. Dermatology Clinic     [address] [facility] [address] [place]  [address]  Phone number to the clinic : [phone]    I told son I will call him back once referral is placed.

## 2023-10-02 ENCOUNTER — TELEPHONE (OUTPATIENT)
Dept: PRIMARY CARE CLINIC | Facility: CLINIC | Age: 88
End: 2023-10-02

## 2023-10-02 NOTE — TELEPHONE ENCOUNTER
Referral is not going through for the fax. I called derma office and the juan a I spoke to said they are not taking new pts. I called and spoke to son to let him know and I asked if he talked to them and he said they told him, he just needs a referral. I asked if he can come and  the referral and he said yes. I told him it will be at the front. He said thank you.

## 2023-10-03 ENCOUNTER — TELEPHONE (OUTPATIENT)
Dept: PRIMARY CARE CLINIC | Facility: CLINIC | Age: 88
End: 2023-10-03

## 2023-10-03 NOTE — TELEPHONE ENCOUNTER
Patient's son dropped off a Certificate of Medical Necessity that Divya Freedman needs to fill out. The packet is in [employer]. [employer] needs this returned along with \"supportive medical documentation. \"    Patient's son also picked up a copy of the dermatology referral.

## 2023-10-03 NOTE — TELEPHONE ENCOUNTER
Completed the form for the patient. They are asking for an Electrical stair chair to help the patient.  I did talk to the son to let him know I will fill out and fax, but I do not know if this will be approved

## 2023-10-04 ENCOUNTER — TELEPHONE (OUTPATIENT)
Dept: PRIMARY CARE CLINIC | Facility: CLINIC | Age: 88
End: 2023-10-04

## 2023-10-22 DIAGNOSIS — M25.551 PAIN IN RIGHT HIP: ICD-10-CM

## 2023-10-22 DIAGNOSIS — R73.03 PREDIABETES: ICD-10-CM

## 2023-10-22 RX ORDER — MELOXICAM 15 MG/1
TABLET ORAL
Qty: 30 TABLET | Refills: 0 | Status: SHIPPED | OUTPATIENT
Start: 2023-10-22

## 2023-10-25 DIAGNOSIS — M25.551 PAIN IN RIGHT HIP: ICD-10-CM

## 2023-10-25 DIAGNOSIS — M48.061 SPINAL STENOSIS, LUMBAR REGION WITHOUT NEUROGENIC CLAUDICATION: ICD-10-CM

## 2023-10-26 RX ORDER — CYCLOBENZAPRINE HCL 10 MG
TABLET ORAL
Qty: 30 TABLET | Refills: 0 | Status: SHIPPED | OUTPATIENT
Start: 2023-10-26

## 2023-11-03 ENCOUNTER — TELEPHONE (OUTPATIENT)
Dept: PRIMARY CARE CLINIC | Facility: CLINIC | Age: 88
End: 2023-11-03

## 2023-11-03 NOTE — TELEPHONE ENCOUNTER
Doroteo Esquivel called from [facility].  She would like a call back at #[phone].  She said it pertained to some corrections that needed to be made.

## 2023-11-06 ENCOUNTER — TELEPHONE (OUTPATIENT)
Dept: PRIMARY CARE CLINIC | Facility: CLINIC | Age: 88
End: 2023-11-06

## 2023-11-06 NOTE — TELEPHONE ENCOUNTER
Called and spoke with the son- asked for him to call insurance company to see who is in the network at let us know, he said no problem thank you

## 2023-11-10 DIAGNOSIS — M25.551 PAIN IN RIGHT HIP: ICD-10-CM

## 2023-11-10 RX ORDER — MELOXICAM 15 MG/1
TABLET ORAL
Qty: 30 TABLET | Refills: 0 | Status: SHIPPED | OUTPATIENT
Start: 2023-11-10

## 2023-11-17 ENCOUNTER — TELEPHONE (OUTPATIENT)
Dept: PRIMARY CARE CLINIC | Facility: CLINIC | Age: 88
End: 2023-11-17

## 2023-11-17 DIAGNOSIS — E78.2 MIXED HYPERLIPIDEMIA: ICD-10-CM

## 2023-11-17 DIAGNOSIS — Z11.59 NEED FOR HEPATITIS B SCREENING TEST: ICD-10-CM

## 2023-11-17 DIAGNOSIS — E11.9 WELL CONTROLLED DIABETES MELLITUS (HCC): ICD-10-CM

## 2023-11-17 DIAGNOSIS — E11.9 WELL CONTROLLED DIABETES MELLITUS (HCC): Primary | ICD-10-CM

## 2023-11-17 DIAGNOSIS — Z12.5 SCREENING FOR PROSTATE CANCER: ICD-10-CM

## 2023-11-17 NOTE — TELEPHONE ENCOUNTER
Patient's son called to have blood work ordered for his father. The son is on the patient's HIPPA. He would like a call back to let him know if and when this can take place at # [phone].

## 2023-11-22 LAB
ALBUMIN SERPL-MCNC: 4.3 G/DL (ref 3.7–4.7)
ALBUMIN/GLOB SERPL: 1.5 {RATIO} (ref 1.2–2.2)
ALP SERPL-CCNC: 79 IU/L (ref 44–121)
ALT SERPL-CCNC: 19 IU/L (ref 0–44)
AST SERPL-CCNC: 22 IU/L (ref 0–40)
BILIRUB SERPL-MCNC: 0.5 MG/DL (ref 0–1.2)
BUN SERPL-MCNC: 18 MG/DL (ref 8–27)
BUN/CREAT SERPL: 18 (ref 10–24)
CALCIUM SERPL-MCNC: 9.7 MG/DL (ref 8.6–10.2)
CHLORIDE SERPL-SCNC: 102 MMOL/L (ref 96–106)
CHOLEST SERPL-MCNC: 218 MG/DL (ref 100–199)
CO2 SERPL-SCNC: 21 MMOL/L (ref 20–29)
CREAT SERPL-MCNC: 1.02 MG/DL (ref 0.76–1.27)
EGFRCR SERPLBLD CKD-EPI 2021: 70 ML/MIN/1.73
ERYTHROCYTE [DISTWIDTH] IN BLOOD BY AUTOMATED COUNT: 13.9 % (ref 11.6–15.4)
GLOBULIN SER CALC-MCNC: 2.9 G/DL (ref 1.5–4.5)
GLUCOSE SERPL-MCNC: 111 MG/DL (ref 70–99)
HBA1C MFR BLD: 5.7 % (ref 4.8–5.6)
HBV SURFACE AB SER QL: NON REACTIVE
HCT VFR BLD AUTO: 43.7 % (ref 37.5–51)
HDLC SERPL-MCNC: 53 MG/DL
HGB BLD-MCNC: 14.7 G/DL (ref 13–17.7)
LDLC SERPL CALC-MCNC: 143 MG/DL (ref 0–99)
MCH RBC QN AUTO: 30.6 PG (ref 26.6–33)
MCHC RBC AUTO-ENTMCNC: 33.6 G/DL (ref 31.5–35.7)
MCV RBC AUTO: 91 FL (ref 79–97)
PLATELET # BLD AUTO: 222 X10E3/UL (ref 150–450)
POTASSIUM SERPL-SCNC: 4.2 MMOL/L (ref 3.5–5.2)
PROT SERPL-MCNC: 7.2 G/DL (ref 6–8.5)
PSA SERPL-MCNC: 2.8 NG/ML (ref 0–4)
RBC # BLD AUTO: 4.81 X10E6/UL (ref 4.14–5.8)
SODIUM SERPL-SCNC: 137 MMOL/L (ref 134–144)
TRIGL SERPL-MCNC: 125 MG/DL (ref 0–149)
VLDLC SERPL CALC-MCNC: 22 MG/DL (ref 5–40)
WBC # BLD AUTO: 6.5 X10E3/UL (ref 3.4–10.8)

## 2023-12-03 DIAGNOSIS — M25.551 PAIN IN RIGHT HIP: ICD-10-CM

## 2023-12-03 DIAGNOSIS — M48.061 SPINAL STENOSIS, LUMBAR REGION WITHOUT NEUROGENIC CLAUDICATION: ICD-10-CM

## 2023-12-03 RX ORDER — MELOXICAM 15 MG/1
TABLET ORAL
Qty: 30 TABLET | Refills: 0 | Status: SHIPPED | OUTPATIENT
Start: 2023-12-03

## 2023-12-03 RX ORDER — CYCLOBENZAPRINE HCL 10 MG
TABLET ORAL
Qty: 30 TABLET | Refills: 0 | Status: SHIPPED | OUTPATIENT
Start: 2023-12-03

## 2023-12-11 ENCOUNTER — TELEPHONE (OUTPATIENT)
Dept: PRIMARY CARE CLINIC | Facility: CLINIC | Age: 88
End: 2023-12-11

## 2023-12-11 NOTE — TELEPHONE ENCOUNTER
Patient's son Zeinab Banks (on [employer]) requested a new referral for knee pain and leg pain, said the patient can not walk.     Phone: [phone]

## 2023-12-11 NOTE — TELEPHONE ENCOUNTER
Spoke to son, Nir Greene and he said his dad cannot walk, has problems with is back. He said Dr. Wong Garcia gave him a referral about 6 months ago but he never used it. I asked if it was for pain clinic and he said yes. I gave him Dr. Zabrina Combs number and said I would fax referral. He said thank you. Referral has been faxed.

## 2024-01-02 DIAGNOSIS — M48.061 SPINAL STENOSIS, LUMBAR REGION WITHOUT NEUROGENIC CLAUDICATION: ICD-10-CM

## 2024-01-02 DIAGNOSIS — M25.551 PAIN IN RIGHT HIP: ICD-10-CM

## 2024-01-02 DIAGNOSIS — R73.03 PREDIABETES: ICD-10-CM

## 2024-01-03 RX ORDER — MELOXICAM 15 MG/1
TABLET ORAL
Qty: 30 TABLET | Refills: 0 | Status: SHIPPED | OUTPATIENT
Start: 2024-01-03

## 2024-01-03 RX ORDER — CYCLOBENZAPRINE HCL 10 MG
TABLET ORAL
Qty: 30 TABLET | Refills: 0 | Status: SHIPPED | OUTPATIENT
Start: 2024-01-03

## 2024-01-21 DIAGNOSIS — M25.551 PAIN IN RIGHT HIP: ICD-10-CM

## 2024-01-21 DIAGNOSIS — M48.061 SPINAL STENOSIS, LUMBAR REGION WITHOUT NEUROGENIC CLAUDICATION: ICD-10-CM

## 2024-01-21 RX ORDER — CYCLOBENZAPRINE HCL 10 MG
TABLET ORAL
Qty: 30 TABLET | Refills: 0 | Status: SHIPPED | OUTPATIENT
Start: 2024-01-21

## 2024-02-16 ENCOUNTER — OFFICE VISIT (OUTPATIENT)
Dept: PRIMARY CARE CLINIC | Facility: CLINIC | Age: 89
End: 2024-02-16

## 2024-02-16 VITALS
BODY MASS INDEX: 22.51 KG/M2 | OXYGEN SATURATION: 99 % | DIASTOLIC BLOOD PRESSURE: 64 MMHG | SYSTOLIC BLOOD PRESSURE: 112 MMHG | HEART RATE: 63 BPM | WEIGHT: 152 LBS | HEIGHT: 69 IN | RESPIRATION RATE: 20 BRPM | TEMPERATURE: 97.1 F

## 2024-02-16 DIAGNOSIS — E11.9 WELL CONTROLLED DIABETES MELLITUS (HCC): ICD-10-CM

## 2024-02-16 DIAGNOSIS — Z00.00 MEDICARE ANNUAL WELLNESS VISIT, SUBSEQUENT: Primary | ICD-10-CM

## 2024-02-16 DIAGNOSIS — I10 PRIMARY HYPERTENSION: ICD-10-CM

## 2024-02-16 DIAGNOSIS — H57.12 LEFT EYE PAIN: ICD-10-CM

## 2024-02-16 DIAGNOSIS — Z23 NEED FOR SHINGLES VACCINE: ICD-10-CM

## 2024-02-16 DIAGNOSIS — R51.9 LEFT-SIDED HEADACHE: ICD-10-CM

## 2024-02-16 DIAGNOSIS — Z23 ENCOUNTER FOR ADMINISTRATION OF VACCINE: ICD-10-CM

## 2024-02-16 DIAGNOSIS — R10.13 DYSPEPSIA: ICD-10-CM

## 2024-02-16 LAB
ALBUMIN SERPL-MCNC: 4.1 G/DL (ref 3.5–5)
ALBUMIN/GLOB SERPL: 1.3 (ref 1.1–2.2)
ALP SERPL-CCNC: 64 U/L (ref 45–117)
ALT SERPL-CCNC: 22 U/L (ref 12–78)
ANION GAP SERPL CALC-SCNC: 7 MMOL/L (ref 5–15)
AST SERPL-CCNC: 20 U/L (ref 15–37)
BILIRUB SERPL-MCNC: 0.5 MG/DL (ref 0.2–1)
BUN SERPL-MCNC: 24 MG/DL (ref 6–20)
BUN/CREAT SERPL: 24 (ref 12–20)
CALCIUM SERPL-MCNC: 9.7 MG/DL (ref 8.5–10.1)
CHLORIDE SERPL-SCNC: 102 MMOL/L (ref 97–108)
CO2 SERPL-SCNC: 24 MMOL/L (ref 21–32)
CREAT SERPL-MCNC: 0.99 MG/DL (ref 0.7–1.3)
ERYTHROCYTE [SEDIMENTATION RATE] IN BLOOD: 4 MM/HR (ref 0–20)
EST. AVERAGE GLUCOSE BLD GHB EST-MCNC: 103 MG/DL
GLOBULIN SER CALC-MCNC: 3.1 G/DL (ref 2–4)
GLUCOSE SERPL-MCNC: 87 MG/DL (ref 65–100)
HBA1C MFR BLD: 5.2 % (ref 4–5.6)
POTASSIUM SERPL-SCNC: 4.4 MMOL/L (ref 3.5–5.1)
PROT SERPL-MCNC: 7.2 G/DL (ref 6.4–8.2)
SODIUM SERPL-SCNC: 133 MMOL/L (ref 136–145)

## 2024-02-16 RX ORDER — FAMOTIDINE 40 MG/1
40 TABLET, FILM COATED ORAL EVERY EVENING
Qty: 30 TABLET | Refills: 0 | Status: SHIPPED | OUTPATIENT
Start: 2024-02-16 | End: 2024-03-17

## 2024-02-16 RX ORDER — ZOSTER VACCINE RECOMBINANT, ADJUVANTED 50 MCG/0.5
0.5 KIT INTRAMUSCULAR SEE ADMIN INSTRUCTIONS
Qty: 0.5 ML | Refills: 0 | Status: SHIPPED | OUTPATIENT
Start: 2024-02-16

## 2024-02-16 NOTE — PROGRESS NOTES
Medicare Annual Wellness Visit    Keanu Veras is here for Medicare AWV and Pain (Top of head pain on left side. Left eye pain as well.)         Subjective       Patient's complete Health Risk Assessment and screening values have been reviewed and are found in Flowsheets. The following problems were reviewed today and where indicated follow up appointments were made and/or referrals ordered.    Positive Risk Factor Screenings with Interventions:              Self-assessment of health:  In general, how would you say your health is?: (!) Poor    Interventions:  Patient declines any further evaluation or treatment    General HRA Questions:  Select all that apply: (!) New or Increased Pain    Pain Interventions:  Referred to: Pain Specialist      Activity, Diet, and Weight:  On average, how many days per week do you engage in moderate to strenuous exercise (like a brisk walk)?: 0 days (Walking)  On average, how many minutes do you engage in exercise at this level?: 0 min    Do you eat balanced/healthy meals regularly?: Yes    Body mass index is 22.45 kg/m².        Inactivity Interventions:  Patient declined any further interventions or treatment         Hearing Screen:  Do you or your family notice any trouble with your hearing that hasn't been managed with hearing aids?: (!) Yes    Interventions:  Patient comments: had a hearing test     Vision Screen:  Do you have difficulty driving, watching TV, or doing any of your daily activities because of your eyesight?: No  Have you had an eye exam within the past year?: (!) No  No results found.    Interventions:   Patient encouraged to make appointment with their eye specialist    Safety:  Do you have any tripping hazards - loose or unsecured carpets or rugs?: (!) Yes  Do you always fasten your seatbelt when you are in a car?: (!) No    Interventions:  Patient declined any further interventions or treatment     Advanced Directives:  Do you have a Living Will?: (!)  97

## 2024-02-16 NOTE — PROGRESS NOTES
\"Have you been to the ER, urgent care clinic since your last visit?  Hospitalized since your last visit?\"    NO    “Have you seen or consulted any other health care providers outside of [facility] since your last visit?”    Yes, [facility]       Chief Complaint   Patient presents with    Medicare AWV    Pain     Top of head pain on left side. Left eye pain as well.       Pt is ok with scribe.       Patient provided with most updated VIS prior to administration. Opportunity given for questions and concerns provided. No concerns at this time    Flu vaccine given in left deltoid. Immunizations administered to patient 2/16/2024 by Ghazala Robison LPN with consent.Patient tolerated procedure well. No reactions noted.

## 2024-02-16 NOTE — PROGRESS NOTES
Keanu Veras (:  1934) is a 89 y.o. male, Established patient, here for evaluation of the following chief complaint(s):  Medicare AWV and Pain (Top of head pain on left side. Left eye pain as well.)       ASSESSMENT/PLAN:  1. Left-sided headache  -     Sedimentation Rate; Future  I ordered blood work to measure sedimentation rate.     2. Left eye pain  -     Sedimentation Rate; Future  I ordered blood work to measure sedimentation rate. I recommend that he find out who his Ophthalmologist is and make an appointment regarding his left eye pain.    3. Primary hypertension  -     Comprehensive Metabolic Panel; Future  I ordered a CMP.    4. Well controlled diabetes mellitus (HCC)  -     Hemoglobin A1C; Future  I ordered blood work to measure Hgb A1C levels. I recommend that he continue taking metformin 500mg daily.    5. Dyspepsia  -     famotidine (PEPCID) 40 MG tablet; Take 1 tablet by mouth every evening, Disp-30 tablet, R-0Normal sent to pharmacy.   I prescribed Pepcid 40mg to start taking nightly. Potential side effects were discussed.    USPTF recommendations discussed with patients.             Subjective   SUBJECTIVE/OBJECTIVE:  Pain  Associated symptoms include headaches (left) and myalgias (legs). Pertinent negatives include no abdominal pain, arthralgias, congestion, coughing, fatigue, rash or sore throat.     Patient presents today for headaches and a MWV.     He reports headaches localized on the left side for the past 3 days. He notes that his headaches have improved. He also reports left eye pain without blurred vision. He is inconsistently taking losartan 25mg daily and pravastatin 20mg nightly. BP is well-controlled in office today at 112/64.     He states that he is sleeping 6-7 hours nightly, but he reports sleep disturbances such as breathing obstructions that have become more frequent with the headaches. He notes that drinking soda before he sleeps relieves his breathing obstructions.

## 2024-02-16 NOTE — PATIENT INSTRUCTIONS

## 2024-03-13 DIAGNOSIS — R10.13 DYSPEPSIA: ICD-10-CM

## 2024-03-13 RX ORDER — FAMOTIDINE 40 MG/1
40 TABLET, FILM COATED ORAL EVERY EVENING
Qty: 90 TABLET | Refills: 0 | Status: SHIPPED | OUTPATIENT
Start: 2024-03-13 | End: 2024-06-11

## 2024-03-13 NOTE — TELEPHONE ENCOUNTER
Requested Prescriptions     Pending Prescriptions Disp Refills    famotidine (PEPCID) 40 MG tablet 90 tablet 0     Sig: Take 1 tablet by mouth every evening        Last Visit  2/16/24  Last Refill 2/16/24

## 2024-04-04 ENCOUNTER — HOSPITAL ENCOUNTER (OUTPATIENT)
Age: 89
Discharge: HOME | End: 2024-04-04
Payer: MEDICARE

## 2024-04-04 DIAGNOSIS — I73.9 PERIPHERAL VASCULAR DISEASE (HCC): ICD-10-CM

## 2024-04-04 PROCEDURE — 93922 UPR/L XTREMITY ART 2 LEVELS: CPT

## 2024-04-05 LAB
VAS LEFT ABI: 1.24
VAS LEFT ARM BP: 141 MMHG
VAS LEFT DORSALIS PEDIS BP: 169 MMHG
VAS LEFT PTA BP: 175 MMHG
VAS RIGHT ABI: 1.2
VAS RIGHT ARM BP: 139 MMHG
VAS RIGHT DORSALIS PEDIS BP: 169 MMHG
VAS RIGHT PTA BP: 168 MMHG

## 2024-05-21 DIAGNOSIS — E78.5 HYPERLIPIDEMIA, UNSPECIFIED HYPERLIPIDEMIA TYPE: Primary | ICD-10-CM

## 2024-05-21 RX ORDER — PRAVASTATIN SODIUM 20 MG
20 TABLET ORAL
Qty: 90 TABLET | Refills: 0 | Status: SHIPPED | OUTPATIENT
Start: 2024-05-21

## 2024-05-21 NOTE — TELEPHONE ENCOUNTER
Patient's son called to request Pravastatin 20mg Tab for the patient.  He stated his father was completely out of medication.  The son was informed to make sure they let us know next time a week in advance that his father needs a refill.  Please send to [facility] on [address]

## 2024-06-25 DIAGNOSIS — E78.5 HYPERLIPIDEMIA, UNSPECIFIED HYPERLIPIDEMIA TYPE: ICD-10-CM

## 2024-06-25 RX ORDER — PRAVASTATIN SODIUM 20 MG
20 TABLET ORAL NIGHTLY
Qty: 90 TABLET | Refills: 0 | OUTPATIENT
Start: 2024-06-25

## 2024-06-26 DIAGNOSIS — E78.5 HYPERLIPIDEMIA, UNSPECIFIED HYPERLIPIDEMIA TYPE: ICD-10-CM

## 2024-06-26 RX ORDER — PRAVASTATIN SODIUM 20 MG
20 TABLET ORAL NIGHTLY
Qty: 90 TABLET | Refills: 0 | OUTPATIENT
Start: 2024-06-26

## 2024-07-30 ENCOUNTER — TELEPHONE (OUTPATIENT)
Dept: PRIMARY CARE CLINIC | Facility: CLINIC | Age: 89
End: 2024-07-30

## 2024-07-30 NOTE — TELEPHONE ENCOUNTER
-Called to schedule a ED follow up with Patient on 8/9/24 and the patients son said it was to far. Patients son said they would like something sooner and asked for a call back.      ---- Message from Alphonse Gee sent at 7/30/2024  9:30 AM EDT -----  Regarding: [facility] Appointment Request  [facility] Appointment Request     Patient needs appointment for [facility] Appointment Type: ED Follow-Up.     Patient Requested Dates(s): Any day  Patient Requested Time: Any time  Provider Name: Mandy De La Vega MD     Reason for Appointment Request: Established Patient - No appointments available during search  --------------------------------------------------------------------------------------------------------------------------     Relationship to Patient: Son     Call Back Information: OK to leave message on voicemail  Preferred Call Back Number: Phone [phone]

## 2024-08-02 ENCOUNTER — OFFICE VISIT (OUTPATIENT)
Dept: PRIMARY CARE CLINIC | Facility: CLINIC | Age: 89
End: 2024-08-02
Payer: MEDICARE

## 2024-08-02 VITALS
WEIGHT: 149 LBS | BODY MASS INDEX: 22.07 KG/M2 | RESPIRATION RATE: 20 BRPM | HEIGHT: 69 IN | DIASTOLIC BLOOD PRESSURE: 74 MMHG | HEART RATE: 71 BPM | TEMPERATURE: 97.5 F | OXYGEN SATURATION: 95 % | SYSTOLIC BLOOD PRESSURE: 117 MMHG

## 2024-08-02 DIAGNOSIS — E78.5 HYPERLIPIDEMIA, UNSPECIFIED HYPERLIPIDEMIA TYPE: ICD-10-CM

## 2024-08-02 DIAGNOSIS — M48.061 SPINAL STENOSIS, LUMBAR REGION WITHOUT NEUROGENIC CLAUDICATION: ICD-10-CM

## 2024-08-02 DIAGNOSIS — I10 PRIMARY HYPERTENSION: Primary | ICD-10-CM

## 2024-08-02 DIAGNOSIS — R51.9 FREQUENT HEADACHES: ICD-10-CM

## 2024-08-02 DIAGNOSIS — R10.13 DYSPEPSIA: ICD-10-CM

## 2024-08-02 DIAGNOSIS — M25.551 PAIN IN RIGHT HIP: ICD-10-CM

## 2024-08-02 DIAGNOSIS — G47.30 SLEEP DISORDER BREATHING: ICD-10-CM

## 2024-08-02 DIAGNOSIS — M48.061 DEGENERATIVE LUMBAR SPINAL STENOSIS: ICD-10-CM

## 2024-08-02 PROCEDURE — 1123F ACP DISCUSS/DSCN MKR DOCD: CPT | Performed by: INTERNAL MEDICINE

## 2024-08-02 PROCEDURE — 99214 OFFICE O/P EST MOD 30 MIN: CPT | Performed by: INTERNAL MEDICINE

## 2024-08-02 RX ORDER — HYDROCODONE BITARTRATE AND ACETAMINOPHEN 5; 325 MG/1; MG/1
TABLET ORAL
COMMUNITY
Start: 2024-07-18

## 2024-08-02 RX ORDER — AMITRIPTYLINE HYDROCHLORIDE 10 MG/1
10 TABLET, FILM COATED ORAL NIGHTLY
Qty: 30 TABLET | Refills: 0 | Status: SHIPPED | OUTPATIENT
Start: 2024-08-02

## 2024-08-02 RX ORDER — LOSARTAN POTASSIUM 25 MG/1
25 TABLET ORAL DAILY
Qty: 90 TABLET | Refills: 0 | Status: SHIPPED | OUTPATIENT
Start: 2024-08-02

## 2024-08-02 SDOH — ECONOMIC STABILITY: FOOD INSECURITY: WITHIN THE PAST 12 MONTHS, YOU WORRIED THAT YOUR FOOD WOULD RUN OUT BEFORE YOU GOT MONEY TO BUY MORE.: NEVER TRUE

## 2024-08-02 SDOH — ECONOMIC STABILITY: FOOD INSECURITY: WITHIN THE PAST 12 MONTHS, THE FOOD YOU BOUGHT JUST DIDN'T LAST AND YOU DIDN'T HAVE MONEY TO GET MORE.: NEVER TRUE

## 2024-08-02 SDOH — ECONOMIC STABILITY: INCOME INSECURITY: HOW HARD IS IT FOR YOU TO PAY FOR THE VERY BASICS LIKE FOOD, HOUSING, MEDICAL CARE, AND HEATING?: NOT HARD AT ALL

## 2024-08-02 ASSESSMENT — PATIENT HEALTH QUESTIONNAIRE - PHQ9
SUM OF ALL RESPONSES TO PHQ QUESTIONS 1-9: 1
SUM OF ALL RESPONSES TO PHQ9 QUESTIONS 1 & 2: 1
SUM OF ALL RESPONSES TO PHQ QUESTIONS 1-9: 1
1. LITTLE INTEREST OR PLEASURE IN DOING THINGS: NOT AT ALL
SUM OF ALL RESPONSES TO PHQ QUESTIONS 1-9: 1
2. FEELING DOWN, DEPRESSED OR HOPELESS: SEVERAL DAYS
SUM OF ALL RESPONSES TO PHQ QUESTIONS 1-9: 1

## 2024-08-02 ASSESSMENT — ENCOUNTER SYMPTOMS
CONSTIPATION: 0
COUGH: 0
DIARRHEA: 0
COLOR CHANGE: 0
EYE DISCHARGE: 0
SORE THROAT: 0
BACK PAIN: 1
ABDOMINAL PAIN: 0
CHEST TIGHTNESS: 0
SHORTNESS OF BREATH: 0
RHINORRHEA: 0

## 2024-08-02 NOTE — PROGRESS NOTES
Keanu Veras (:  1934) is a 89 y.o. male, Established patient, here for evaluation of the following chief complaint(s):  ED Follow-up (High BP) and Sleep Problem (Stated when he goes to sleep he wakes up and feels like he stopped breathing in his sleep. He has had a sleep test done 8-10 years ago. )          ASSESSMENT/PLAN:  1. Primary hypertension  BP is well-controlled on current medication.I refilled the pt losartan.    2. Degenerative lumbar spinal stenosis  Followed by pain management. On Norco and getting injections.     3. Sleep disorder breathing  -     [facility] - Kevin Christine MD, Sleep Medicine, [place] ([facility])  -     amitriptyline (ELAVIL) 10 MG tablet; Take 1 tablet by mouth nightly, Disp-30 tablet, R-0Normal sent to pharmacy.  I referred the pt to follow up with sleep medicine. I prescribed the pt Elavil for his headache pain and sleep.    4. Frequent headaches  -     amitriptyline (ELAVIL) 10 MG tablet; Take 1 tablet by mouth nightly, Disp-30 tablet, R-0Normal sent to pharmacy.  I prescribed the pt Elavil for his headache pain and sleep.           Subjective   SUBJECTIVE/OBJECTIVE:  HPI    Pt presents for a ED follow up    Pt explains he has loss weight due to his diet to manage his Blood sugar . He insists he eats. He stopped eating rice and he eats a little bit of bread.  Pt was seen in ED because he felt bad on Monday and he was unwell so he went to ED. He had elevated . Pt admits that he hasn't been taking the losartan 25 mg daily before being admitted to ED.. He received a shot to reduce his BP which it came down to 180. He wasn't prescribed any other medication from ED. Pt also complain of pain on the left side of his face when his BP was elevated. He admits to having a headache on the left side.    Pt states his legs cause him pain which is due to his back arthritis.  He states the pain fluctuates. He has received injection and pain medication which pt states has been

## 2024-08-02 NOTE — PROGRESS NOTES
\"Have you been to the ER, urgent care clinic since your last visit?  Hospitalized since your last visit?\"    ER      “Have you seen or consulted any other health care providers outside of [facility] since your last visit?”    Licha Lou       Chief Complaint   Patient presents with    ED Follow-up     High BP    Sleep Problem     Stated when he goes to sleep he wakes up and feels like he stopped breathing in his sleep. He has had a sleep test done 8-10 years ago.        Pt is ok with scribe.

## 2024-08-03 RX ORDER — MELOXICAM 15 MG/1
TABLET ORAL
Qty: 30 TABLET | Refills: 0 | Status: SHIPPED | OUTPATIENT
Start: 2024-08-03

## 2024-08-03 RX ORDER — FAMOTIDINE 40 MG/1
40 TABLET, FILM COATED ORAL EVERY EVENING
Qty: 90 TABLET | Refills: 0 | Status: SHIPPED | OUTPATIENT
Start: 2024-08-03

## 2024-08-03 RX ORDER — PRAVASTATIN SODIUM 20 MG
20 TABLET ORAL NIGHTLY
Qty: 90 TABLET | Refills: 0 | Status: SHIPPED | OUTPATIENT
Start: 2024-08-03

## 2024-08-03 RX ORDER — CYCLOBENZAPRINE HCL 10 MG
TABLET ORAL
Qty: 30 TABLET | Refills: 0 | Status: SHIPPED | OUTPATIENT
Start: 2024-08-03

## 2024-08-07 ENCOUNTER — TELEPHONE (OUTPATIENT)
Dept: PRIMARY CARE CLINIC | Facility: CLINIC | Age: 89
End: 2024-08-07

## 2024-08-07 DIAGNOSIS — K59.09 OTHER CONSTIPATION: Primary | ICD-10-CM

## 2024-08-07 RX ORDER — LACTULOSE 10 G/15ML
10 SOLUTION ORAL 2 TIMES DAILY
Qty: 300 ML | Refills: 0 | Status: SHIPPED | OUTPATIENT
Start: 2024-08-07 | End: 2024-08-17

## 2024-08-07 NOTE — TELEPHONE ENCOUNTER
Patient has severe constipation and nothing is helping.  He asked to speak to Dr De La Vega immediately but I told him one of her nurses would call.

## 2024-08-07 NOTE — TELEPHONE ENCOUNTER
Department of [address] [place] [address]    Discharge Summary      NAME:  Cindy Dietrich  :  1967  MRN:  [id number]    Admit date:  10/6/2021  Discharge date:  10/12/2021    Admitting Physician:  Mayela Milton MD    Primary Diagnosis on Admission:   Present on Admission:   Open wound of right index finger   Adverse effect of COVID-19 vaccine   Wound dehiscence   Amputation finger      Secondary Diagnoses:  does not have any pertinent problems on file. Admission Condition:  fair     Discharged Condition: stable    Hospital Course: The patient was admitted for the management of wound dehiscence of the right index finger. Patient had undergone incision and drainage in September of the right index finger. Admits that she had hurt the finger afterwards and that there was wound dehiscence. Culture of the wound grew gram-positive cocci as well as Candida. Patient was treated with IV ceftaroline and Diflucan, as per infectious disease. She was assessed by orthopedic/plastic surgery, the decision was made to amputate. Today on day of discharge pt feels better with no further complaints. Vitals and Labs are at pts baseline. All consultants involved during this admission are agreeable to d/c. To take fluconazole p.o. for 1 more week and daptomycin daily for 7 days starting on day of discharge. Patient to follow-up with infectious disease in 2 weeks. Consults:  ID, plastic surgery    Significant Diagnostic/theraputic interventions: Right index finger amputation, IV antibiotics      Disposition:   home    Instructions to Patient: Follow up with PCP within 1 week and ID within 2 weeks.       Follow up with Deandre Duff MD in  1-2 weeks    Discharge Medications:     Simon Self   Home Medication Instructions [facility]:[id number]    Printed on:10/14/21 1025   Medication Information                      albuterol sulfate  (90 Base) MCG/ACT Spoke to son and relayed message. He said thank you.    inhaler  Inhale 2 puffs into the lungs every 4 hours as needed for Wheezing             budesonide-formoterol (SYMBICORT) 80-4.5 MCG/ACT AERO  Inhale 2 puffs into the lungs 2 times daily             cetirizine (ZYRTEC) 10 MG tablet  Take 1 tablet by mouth daily             DAPTOmycin (CUBICIN) infusion  Infuse 417.2 mg intravenously every 24 hours for 7 days Compound per protocol. dicyclomine (BENTYL) 10 MG capsule  Take 1 capsule by mouth 4 times daily             FLOVENT  MCG/ACT inhaler  Inhale 2 puffs into the lungs 2 times daily             fluconazole (DIFLUCAN) 200 MG tablet  Take 2 tablets by mouth every 24 hours for 7 days             FreeStyle Lancets MISC  1 each by Does not apply route 3 times daily             FREESTYLE LITE strip  1 each by Does not apply route 3 times daily             HYDROmorphone (DILAUDID) 2 MG tablet  Take 1 tablet by mouth every 12 hours as needed for Pain for up to 5 days. insulin glargine (LANTUS SOLOSTAR) 100 UNIT/ML injection pen  Inject 20 Units into the skin 2 times daily             Insulin Pen Needle ([employer] PEN NEEDLES 31G) 31G X 8 MM MISC  1 each by Does not apply route daily             metFORMIN (GLUCOPHAGE) 1000 MG tablet  Take 1 tablet by mouth 2 times daily (with meals)             pantoprazole (PROTONIX) 20 MG tablet  Take 1 tablet by mouth 2 times daily (before meals)             pregabalin (LYRICA) 75 MG capsule  Take 1 capsule by mouth 2 times daily for 30 days.              sennosides-docusate sodium (SENOKOT-S) 8.6-50 MG tablet  Take 2 tablets by mouth daily             traZODone (DESYREL) 150 MG tablet  Take 1 tablet by mouth nightly             venlafaxine (EFFEXOR XR) 150 MG extended release capsule  Take 1 capsule by mouth daily (with breakfast)                 Send Copies to: Hosea Abreu MD,      Note that over 30 minutes was spent in preparing discharge papers, discussing discharge with patient and family, medication review, etc.      Reggie Call MD  Family Medicine Resident  Family Medicine Inpatient Service  10/14/2021 10:25 AM          Please note that this chart was generated using voice recognition Dragon dictation software.   Although every effort was made to ensure the accuracy of this automated transcription, some errors in transcription may have occurred

## 2024-08-08 ENCOUNTER — OFFICE VISIT (OUTPATIENT)
Age: 89
End: 2024-08-08
Payer: MEDICARE

## 2024-08-08 VITALS
BODY MASS INDEX: 22.41 KG/M2 | HEART RATE: 59 BPM | HEIGHT: 69 IN | OXYGEN SATURATION: 97 % | DIASTOLIC BLOOD PRESSURE: 65 MMHG | WEIGHT: 151.3 LBS | SYSTOLIC BLOOD PRESSURE: 108 MMHG

## 2024-08-08 DIAGNOSIS — G47.33 OSA (OBSTRUCTIVE SLEEP APNEA): Primary | ICD-10-CM

## 2024-08-08 PROCEDURE — 99203 OFFICE O/P NEW LOW 30 MIN: CPT | Performed by: SPECIALIST

## 2024-08-08 PROCEDURE — 1123F ACP DISCUSS/DSCN MKR DOCD: CPT | Performed by: SPECIALIST

## 2024-08-08 ASSESSMENT — SLEEP AND FATIGUE QUESTIONNAIRES
HOW LIKELY ARE YOU TO NOD OFF OR FALL ASLEEP WHILE WATCHING TV: MODERATE CHANCE OF DOZING
HOW LIKELY ARE YOU TO NOD OFF OR FALL ASLEEP WHILE SITTING AND READING: HIGH CHANCE OF DOZING
ESS TOTAL SCORE: 17
HOW LIKELY ARE YOU TO NOD OFF OR FALL ASLEEP IN A CAR, WHILE STOPPED FOR A FEW MINUTES IN TRAFFIC: MODERATE CHANCE OF DOZING
HOW LIKELY ARE YOU TO NOD OFF OR FALL ASLEEP WHEN YOU ARE A PASSENGER IN A CAR FOR AN HOUR WITHOUT A BREAK: HIGH CHANCE OF DOZING
HOW LIKELY ARE YOU TO NOD OFF OR FALL ASLEEP WHILE SITTING QUIETLY AFTER LUNCH WITHOUT ALCOHOL: MODERATE CHANCE OF DOZING
HOW LIKELY ARE YOU TO NOD OFF OR FALL ASLEEP WHILE SITTING INACTIVE IN A PUBLIC PLACE: SLIGHT CHANCE OF DOZING
HOW LIKELY ARE YOU TO NOD OFF OR FALL ASLEEP WHILE LYING DOWN TO REST IN THE AFTERNOON WHEN CIRCUMSTANCES PERMIT: MODERATE CHANCE OF DOZING
HOW LIKELY ARE YOU TO NOD OFF OR FALL ASLEEP WHILE SITTING AND TALKING TO SOMEONE: MODERATE CHANCE OF DOZING

## 2024-08-08 NOTE — PROGRESS NOTES
Patient had a SS 10 yrs ago but was told he did not have any sleep apnea.  
Rfl: 0    famotidine (PEPCID) 40 MG tablet, TAKE 1 TABLET BY MOUTH EVERY DAY IN THE EVENING, Disp: 90 tablet, Rfl: 0    meloxicam (MOBIC) 15 MG tablet, TAKE 1 TABLET BY MOUTH EVERY DAY, Disp: 30 tablet, Rfl: 0    pravastatin (PRAVACHOL) 20 MG tablet, TAKE 1 TABLET BY MOUTH EVERY DAY AT NIGHT, Disp: 90 tablet, Rfl: 0    HYDROcodone-acetaminophen (NORCO) 5-325 MG per tablet, TAKE 1/2 TABLET 2 TIMES A DAY AS NEEDED FOR CHRONIC PAIN., Disp: , Rfl:     amitriptyline (ELAVIL) 10 MG tablet, Take 1 tablet by mouth nightly, Disp: 30 tablet, Rfl: 0    losartan (COZAAR) 25 MG tablet, Take 1 tablet by mouth daily, Disp: 90 tablet, Rfl: 0    Cyanocobalamin (VITAMIN B 12 PO), Take by mouth, Disp: , Rfl:     MAGNESIUM PO, Take by mouth, Disp: , Rfl:     metFORMIN (GLUCOPHAGE) 500 MG tablet, TAKE 1 TABLET BY MOUTH EVERY DAY WITH BREAKFAST, Disp: 90 tablet, Rfl: 0    blood glucose monitor strips, Test 2 times a day & as needed for symptoms of irregular blood glucose. Dispense sufficient amount for indicated testing frequency plus additional to accommodate PRN testing needs., Disp: 100 strip, Rfl: 5    gabapentin (NEURONTIN) 300 MG capsule, BEGIN 1 CAPSULE AT NIGHT FOR 3 NIGHTS, THEN INCREASE TO 2 CAPSULES AT NIGHT THE 4TH NIGHT., Disp: , Rfl:     aspirin 81 MG EC tablet, Take 1 tablet by mouth daily, Disp: , Rfl:     Cholecalciferol 50 MCG (2000 UT) CAPS, ceived the following from [facility]: Outside name: VITAMIN D3 2,000 unit cap, Disp: , Rfl:     zoster recombinant adjuvanted vaccine (SHINGRIX) 50 MCG/0.5ML SUSR injection, Inject 0.5 mLs into the muscle See Admin Instructions for 1 dose 1 dose now and repeat in 2-6 months (Patient not taking: Reported on 8/2/2024), Disp: 0.5 mL, Rfl: 0    diclofenac sodium (VOLTAREN) 1 % GEL, Apply 2 g topically 4 times daily (Patient not taking: Reported on 2/16/2024), Disp: 100 g, Rfl: 0    lidocaine (LIDODERM) 5 %, Apply patch to the affected area for 12 hours a day and remove

## 2024-08-16 NOTE — PROGRESS NOTES
Chief Complaint   Patient presents with    Head Pain     aprrox 1 month. has tried otc meds without relief     Patient provided with most updated VIS prior to administration. Opportunity given for questions and concerns provided. No concerns at this time    Immunizations administered to patient 12/15/2021 by Lala Beatty LPN with consent. Patient tolerated procedure well. No reactions noted. PAST SURGICAL HISTORY:  Carpal tunnel syndrome     H/O aortic valve replacement     Status post laser lithotripsy of ureteral calculus     Status post small bowel resection     Uterine fibroid removal

## 2024-08-24 DIAGNOSIS — G47.30 SLEEP DISORDER BREATHING: ICD-10-CM

## 2024-08-24 DIAGNOSIS — R51.9 FREQUENT HEADACHES: ICD-10-CM

## 2024-08-25 RX ORDER — AMITRIPTYLINE HYDROCHLORIDE 10 MG/1
10 TABLET, FILM COATED ORAL NIGHTLY
Qty: 90 TABLET | Refills: 1 | Status: SHIPPED | OUTPATIENT
Start: 2024-08-25

## 2024-08-26 ENCOUNTER — HOSPITAL ENCOUNTER (OUTPATIENT)
Facility: HOSPITAL | Age: 89
Discharge: HOME | End: 2024-08-29
Payer: MEDICARE

## 2024-08-26 ENCOUNTER — PROCEDURE VISIT (OUTPATIENT)
Age: 89
End: 2024-08-26

## 2024-08-26 DIAGNOSIS — G47.33 OSA (OBSTRUCTIVE SLEEP APNEA): ICD-10-CM

## 2024-08-26 DIAGNOSIS — G47.33 OSA (OBSTRUCTIVE SLEEP APNEA): Primary | ICD-10-CM

## 2024-08-26 PROCEDURE — 95800 SLP STDY UNATTENDED: CPT | Performed by: SPECIALIST

## 2024-08-26 NOTE — PROGRESS NOTES
S>Keanu Veras is a 89 y.o. male seen today to receive a home sleep testing unit (WatchPAT). Son was with patient to assist in translation.    Patient and son were educated on proper hookup and operation of the WatchPAT HST via detailed instruction sheet .  Instruction forms with after hours contact and documentation were signed.    O>    There were no vitals taken for this visit.      A>  No diagnosis found.      P>  General information regarding operations and maintenance of the device was provided.  Follow-up appointment was made to return the WatchPAT HST. He will be contacted once the results have been reviewed.  He was asked to contact our office for any problems regarding his home sleep test study.

## 2024-09-22 ENCOUNTER — CLINICAL DOCUMENTATION (OUTPATIENT)
Age: 89
End: 2024-09-22

## 2024-09-23 ENCOUNTER — TELEPHONE (OUTPATIENT)
Age: 89
End: 2024-09-23

## 2024-10-29 DIAGNOSIS — M25.551 PAIN IN RIGHT HIP: ICD-10-CM

## 2024-10-29 DIAGNOSIS — I10 PRIMARY HYPERTENSION: ICD-10-CM

## 2024-10-29 DIAGNOSIS — M48.061 SPINAL STENOSIS, LUMBAR REGION WITHOUT NEUROGENIC CLAUDICATION: ICD-10-CM

## 2024-10-29 DIAGNOSIS — E78.5 HYPERLIPIDEMIA, UNSPECIFIED HYPERLIPIDEMIA TYPE: ICD-10-CM

## 2024-10-29 DIAGNOSIS — R10.13 DYSPEPSIA: ICD-10-CM

## 2024-10-29 RX ORDER — MELOXICAM 15 MG/1
TABLET ORAL
Qty: 30 TABLET | Refills: 0 | Status: SHIPPED | OUTPATIENT
Start: 2024-10-29

## 2024-10-29 RX ORDER — CYCLOBENZAPRINE HCL 10 MG
TABLET ORAL
Qty: 30 TABLET | Refills: 0 | Status: SHIPPED | OUTPATIENT
Start: 2024-10-29

## 2024-10-29 RX ORDER — LOSARTAN POTASSIUM 25 MG/1
25 TABLET ORAL DAILY
Qty: 90 TABLET | Refills: 0 | Status: SHIPPED | OUTPATIENT
Start: 2024-10-29

## 2024-10-29 RX ORDER — PRAVASTATIN SODIUM 20 MG
20 TABLET ORAL NIGHTLY
Qty: 90 TABLET | Refills: 0 | Status: SHIPPED | OUTPATIENT
Start: 2024-10-29

## 2024-10-29 RX ORDER — FAMOTIDINE 40 MG/1
40 TABLET, FILM COATED ORAL EVERY EVENING
Qty: 90 TABLET | Refills: 0 | Status: SHIPPED | OUTPATIENT
Start: 2024-10-29

## 2024-11-04 DIAGNOSIS — E78.5 HYPERLIPIDEMIA, UNSPECIFIED HYPERLIPIDEMIA TYPE: ICD-10-CM

## 2024-11-04 DIAGNOSIS — R10.13 DYSPEPSIA: ICD-10-CM

## 2024-11-04 DIAGNOSIS — M25.551 PAIN IN RIGHT HIP: ICD-10-CM

## 2024-11-04 DIAGNOSIS — M48.061 SPINAL STENOSIS, LUMBAR REGION WITHOUT NEUROGENIC CLAUDICATION: ICD-10-CM

## 2024-11-04 RX ORDER — CYCLOBENZAPRINE HCL 10 MG
TABLET ORAL
Qty: 30 TABLET | Refills: 0 | OUTPATIENT
Start: 2024-11-04

## 2024-11-04 RX ORDER — MELOXICAM 15 MG/1
TABLET ORAL
Qty: 30 TABLET | Refills: 0 | OUTPATIENT
Start: 2024-11-04

## 2024-11-04 RX ORDER — FAMOTIDINE 40 MG/1
40 TABLET, FILM COATED ORAL EVERY EVENING
Qty: 90 TABLET | Refills: 0 | OUTPATIENT
Start: 2024-11-04

## 2024-11-04 RX ORDER — PRAVASTATIN SODIUM 20 MG
20 TABLET ORAL NIGHTLY
Qty: 90 TABLET | Refills: 0 | OUTPATIENT
Start: 2024-11-04

## 2024-11-11 DIAGNOSIS — R73.03 PREDIABETES: ICD-10-CM

## 2024-11-11 DIAGNOSIS — E11.9 DIABETES MELLITUS WITHOUT COMPLICATION (HCC): ICD-10-CM

## 2024-11-11 DIAGNOSIS — M25.551 PAIN IN RIGHT HIP: ICD-10-CM

## 2024-11-11 DIAGNOSIS — E78.5 HYPERLIPIDEMIA, UNSPECIFIED HYPERLIPIDEMIA TYPE: ICD-10-CM

## 2024-11-11 DIAGNOSIS — M25.561 CHRONIC PAIN OF RIGHT KNEE: ICD-10-CM

## 2024-11-11 DIAGNOSIS — I10 PRIMARY HYPERTENSION: ICD-10-CM

## 2024-11-11 DIAGNOSIS — G89.29 CHRONIC PAIN OF RIGHT KNEE: ICD-10-CM

## 2024-11-11 DIAGNOSIS — R10.13 DYSPEPSIA: ICD-10-CM

## 2024-11-12 RX ORDER — PRAVASTATIN SODIUM 20 MG
20 TABLET ORAL NIGHTLY
Qty: 90 TABLET | Refills: 0 | OUTPATIENT
Start: 2024-11-12

## 2024-11-12 RX ORDER — FAMOTIDINE 40 MG/1
40 TABLET, FILM COATED ORAL EVERY EVENING
Qty: 90 TABLET | Refills: 0 | OUTPATIENT
Start: 2024-11-12

## 2024-11-12 RX ORDER — GLUCOSAMINE HCL/CHONDROITIN SU 500-400 MG
CAPSULE ORAL
Qty: 100 STRIP | Refills: 5 | Status: SHIPPED | OUTPATIENT
Start: 2024-11-12

## 2024-11-12 RX ORDER — MELOXICAM 15 MG/1
15 TABLET ORAL DAILY
Qty: 30 TABLET | Refills: 0 | OUTPATIENT
Start: 2024-11-12

## 2024-11-12 RX ORDER — LOSARTAN POTASSIUM 25 MG/1
25 TABLET ORAL DAILY
Qty: 90 TABLET | Refills: 0 | OUTPATIENT
Start: 2024-11-12

## 2024-11-19 DIAGNOSIS — M25.551 PAIN IN RIGHT HIP: ICD-10-CM

## 2024-11-19 DIAGNOSIS — M48.061 SPINAL STENOSIS, LUMBAR REGION WITHOUT NEUROGENIC CLAUDICATION: ICD-10-CM

## 2024-11-19 RX ORDER — CYCLOBENZAPRINE HCL 10 MG
TABLET ORAL
Qty: 30 TABLET | Refills: 0 | Status: SHIPPED | OUTPATIENT
Start: 2024-11-19

## 2024-11-19 RX ORDER — MELOXICAM 15 MG/1
TABLET ORAL
Qty: 30 TABLET | Refills: 0 | Status: SHIPPED | OUTPATIENT
Start: 2024-11-19

## 2024-11-22 DIAGNOSIS — R10.13 DYSPEPSIA: ICD-10-CM

## 2024-11-22 DIAGNOSIS — R73.03 PREDIABETES: ICD-10-CM

## 2024-11-24 RX ORDER — FAMOTIDINE 40 MG/1
40 TABLET, FILM COATED ORAL EVERY EVENING
Qty: 90 TABLET | Refills: 0 | Status: SHIPPED | OUTPATIENT
Start: 2024-11-24

## 2024-11-25 ENCOUNTER — TELEPHONE (OUTPATIENT)
Dept: PRIMARY CARE CLINIC | Facility: CLINIC | Age: 88
End: 2024-11-25

## 2024-11-25 NOTE — TELEPHONE ENCOUNTER
Patients son Barry said the patient needs a refill for  Pravastatin  20 mg tablet  Barry said the patient is totally out  [facility]/PHARMACY #[id number] - [place], [place] - [facility] - P [phone] - f [phone] [[id number]]

## 2024-11-25 NOTE — TELEPHONE ENCOUNTER
Called pharmacy and pravastatin was picked up on 10/29 for 90 days. I called and spoke to son he said he will have to go back and check his dads meds and let us know.

## 2024-12-03 ENCOUNTER — OFFICE VISIT (OUTPATIENT)
Dept: PRIMARY CARE CLINIC | Facility: CLINIC | Age: 88
End: 2024-12-03

## 2024-12-03 VITALS
RESPIRATION RATE: 15 BRPM | BODY MASS INDEX: 22.51 KG/M2 | OXYGEN SATURATION: 100 % | SYSTOLIC BLOOD PRESSURE: 124 MMHG | HEIGHT: 69 IN | TEMPERATURE: 97.3 F | WEIGHT: 152 LBS | DIASTOLIC BLOOD PRESSURE: 60 MMHG | HEART RATE: 36 BPM

## 2024-12-03 DIAGNOSIS — R10.30 LOWER ABDOMINAL PAIN: ICD-10-CM

## 2024-12-03 DIAGNOSIS — R00.1 BRADYCARDIA: ICD-10-CM

## 2024-12-03 DIAGNOSIS — R63.0 LOSS OF APPETITE: ICD-10-CM

## 2024-12-03 DIAGNOSIS — E11.9 WELL CONTROLLED DIABETES MELLITUS (HCC): ICD-10-CM

## 2024-12-03 DIAGNOSIS — R10.30 LOWER ABDOMINAL PAIN: Primary | ICD-10-CM

## 2024-12-03 DIAGNOSIS — Z23 NEEDS FLU SHOT: ICD-10-CM

## 2024-12-03 DIAGNOSIS — I49.3 ASYMPTOMATIC PVCS: ICD-10-CM

## 2024-12-03 DIAGNOSIS — R14.0 ABDOMINAL BLOATING: ICD-10-CM

## 2024-12-03 DIAGNOSIS — E78.2 MIXED HYPERLIPIDEMIA: ICD-10-CM

## 2024-12-03 SDOH — ECONOMIC STABILITY: FOOD INSECURITY: WITHIN THE PAST 12 MONTHS, YOU WORRIED THAT YOUR FOOD WOULD RUN OUT BEFORE YOU GOT MONEY TO BUY MORE.: NEVER TRUE

## 2024-12-03 SDOH — ECONOMIC STABILITY: INCOME INSECURITY: HOW HARD IS IT FOR YOU TO PAY FOR THE VERY BASICS LIKE FOOD, HOUSING, MEDICAL CARE, AND HEATING?: NOT HARD AT ALL

## 2024-12-03 SDOH — ECONOMIC STABILITY: FOOD INSECURITY: WITHIN THE PAST 12 MONTHS, THE FOOD YOU BOUGHT JUST DIDN'T LAST AND YOU DIDN'T HAVE MONEY TO GET MORE.: NEVER TRUE

## 2024-12-03 ASSESSMENT — ENCOUNTER SYMPTOMS
DIARRHEA: 0
CONSTIPATION: 0
COLOR CHANGE: 0
BACK PAIN: 0
SORE THROAT: 0
ABDOMINAL PAIN: 1
ABDOMINAL DISTENTION: 1
RHINORRHEA: 0
SHORTNESS OF BREATH: 0
COUGH: 0
EYE DISCHARGE: 0
CHEST TIGHTNESS: 0

## 2024-12-03 ASSESSMENT — PATIENT HEALTH QUESTIONNAIRE - PHQ9
SUM OF ALL RESPONSES TO PHQ QUESTIONS 1-9: 0
1. LITTLE INTEREST OR PLEASURE IN DOING THINGS: NOT AT ALL
SUM OF ALL RESPONSES TO PHQ QUESTIONS 1-9: 0
2. FEELING DOWN, DEPRESSED OR HOPELESS: NOT AT ALL
SUM OF ALL RESPONSES TO PHQ9 QUESTIONS 1 & 2: 0
SUM OF ALL RESPONSES TO PHQ QUESTIONS 1-9: 0
SUM OF ALL RESPONSES TO PHQ QUESTIONS 1-9: 0

## 2024-12-03 NOTE — PROGRESS NOTES
Patient provided with most updated VIS prior to administration. Opportunity given for questions and concerns provided. No concerns at this time    Immunizations administered to patient 12/3/2024 by Ghazala Robison LPN with consent.Patient tolerated procedure well. No reactions noted.

## 2024-12-03 NOTE — PROGRESS NOTES
Keanu Veras (:  1934) is a 89 y.o. male, Established patient, here for evaluation of the following chief complaint(s):  Abdominal Pain (1 week)      ASSESSMENT/PLAN:  1. Lower abdominal pain  -     CBC; Future  -     Comprehensive Metabolic Panel; Future  I ordered CBC and CMP to evaluate stomach discomfort. I ordered a abdominal CT scan. Waiting for results.    2. Abdominal bloating  I ordered CBC and CMP to evaluate stomach discomfort. I ordered a abdominal CT scan. Waiting for results.    3. Bradycardia  -     EKG 12 lead; Future  EKG showed frequent PVCs, Atrial enlargement.   Referred to the cardiology. Message sent to be seen sooner.     4. Loss of appetite  I ordered CBC and CMP to evaluate stomach discomfort. I ordered a abdominal CT scan. Waiting for results.    5. Well controlled diabetes mellitus (HCC)  -     Hemoglobin A1C; Future  I ordered blood work to measure Hgb A1C levels.     6. Mixed hyperlipidemia  -     Lipid Panel; Future  I ordered a lipid panel to assess cholesterol levels. Waiting for results.    7. Needs flu shot  -     Influenza, FLUAD Trivalent, (age 65 y+), IM, Preservative Free, 0.5mL  Influenza vaccine administered in office.             Subjective   SUBJECTIVE/OBJECTIVE:  Abdominal Pain  Pertinent negatives include no arthralgias, constipation, diarrhea, dysuria, frequency, headaches or myalgias.     Patient presents today for an office visit due to abdominal pain. He is fasting for labs.     He says he has abdominal pain every night. He complains of gas. He reports the pain disrupting his sleep. He says his abdomen feels heavy and bloated. The pain mostly  occurs when he's eating.     He received a flu vaccine in the office today. He denies COVID vaccine.    He is bradycardic in the office today with heart rate at 36 bpm. Denies any chest pain or SOB.    He takes pravastatin 20 mg daily.     He takes metformin 500 mg daily.     He takes losartan 25 mg daily.

## 2024-12-03 NOTE — PROGRESS NOTES
Health Decision Maker has been checked with the patient   Primary Decision Maker: Barry Oreilly - Child - [phone]         Chief Complaint   Patient presents with    Abdominal Pain     1 week       \"Have you been to the ER, urgent care clinic since your last visit?  Hospitalized since your last visit?\"    NO    “Have you seen or consulted any other health care providers outside of [facility] since your last visit?”    NO      Vitals:    12/03/24 1248   Resp: 15   Temp: 97.3 °F (36.3 °C)   TempSrc: Temporal   Weight: 68.9 kg (152 lb)   Height: 1.753 m (5' 9\")                 Click Here for Release of Records Request

## 2024-12-05 ENCOUNTER — TELEPHONE (OUTPATIENT)
Dept: PRIMARY CARE CLINIC | Facility: CLINIC | Age: 88
End: 2024-12-05

## 2024-12-05 DIAGNOSIS — R63.0 LOSS OF APPETITE: ICD-10-CM

## 2024-12-05 DIAGNOSIS — R10.30 LOWER ABDOMINAL PAIN: Primary | ICD-10-CM

## 2024-12-05 NOTE — TELEPHONE ENCOUNTER
Patient called saying he had an appt with Dr De La Vega on 12/3 and was supposed to order a CT Scan of the abdomen but didn't see any orders.  He would like a call back to let him know at #[phone]

## 2024-12-06 LAB
ALBUMIN SERPL-MCNC: 4.2 G/DL (ref 3.5–5)
ALBUMIN/GLOB SERPL: 1.2 (ref 1.1–2.2)
ALP SERPL-CCNC: 82 U/L (ref 45–117)
ALT SERPL-CCNC: 25 U/L (ref 12–78)
ANION GAP SERPL CALC-SCNC: 7 MMOL/L (ref 2–12)
AST SERPL-CCNC: ABNORMAL U/L (ref 15–37)
BILIRUB SERPL-MCNC: 0.8 MG/DL (ref 0.2–1)
BUN SERPL-MCNC: 17 MG/DL (ref 6–20)
BUN/CREAT SERPL: 18 (ref 12–20)
CALCIUM SERPL-MCNC: 9.8 MG/DL (ref 8.5–10.1)
CHLORIDE SERPL-SCNC: 103 MMOL/L (ref 97–108)
CHOLEST SERPL-MCNC: 172 MG/DL
CO2 SERPL-SCNC: 24 MMOL/L (ref 21–32)
CREAT SERPL-MCNC: 0.96 MG/DL (ref 0.7–1.3)
ERYTHROCYTE [DISTWIDTH] IN BLOOD BY AUTOMATED COUNT: 14.7 % (ref 11.5–14.5)
EST. AVERAGE GLUCOSE BLD GHB EST-MCNC: 120 MG/DL
GLOBULIN SER CALC-MCNC: 3.4 G/DL (ref 2–4)
GLUCOSE SERPL-MCNC: 85 MG/DL (ref 65–100)
HBA1C MFR BLD: 5.8 % (ref 4–5.6)
HCT VFR BLD AUTO: 43.5 % (ref 36.6–50.3)
HDLC SERPL-MCNC: 59 MG/DL
HDLC SERPL: 2.9 (ref 0–5)
HGB BLD-MCNC: 14.3 G/DL (ref 12.1–17)
LDLC SERPL CALC-MCNC: 89.8 MG/DL (ref 0–100)
MCH RBC QN AUTO: 30 PG (ref 26–34)
MCHC RBC AUTO-ENTMCNC: 32.9 G/DL (ref 30–36.5)
MCV RBC AUTO: 91.4 FL (ref 80–99)
NRBC # BLD: 0 K/UL (ref 0–0.01)
NRBC BLD-RTO: 0 PER 100 WBC
PLATELET # BLD AUTO: 210 K/UL (ref 150–400)
PMV BLD AUTO: 11.4 FL (ref 8.9–12.9)
POTASSIUM SERPL-SCNC: ABNORMAL MMOL/L (ref 3.5–5.1)
PROT SERPL-MCNC: 7.6 G/DL (ref 6.4–8.2)
RBC # BLD AUTO: 4.76 M/UL (ref 4.1–5.7)
SODIUM SERPL-SCNC: 134 MMOL/L (ref 136–145)
TRIGL SERPL-MCNC: 116 MG/DL
VLDLC SERPL CALC-MCNC: 23.2 MG/DL
WBC # BLD AUTO: 8.1 K/UL (ref 4.1–11.1)

## 2024-12-08 DIAGNOSIS — E87.1 HYPONATREMIA: Primary | ICD-10-CM

## 2024-12-10 ENCOUNTER — HOSPITAL ENCOUNTER (OUTPATIENT)
Facility: HOSPITAL | Age: 88
Discharge: HOME | End: 2024-12-13
Attending: INTERNAL MEDICINE
Payer: MEDICARE

## 2024-12-10 DIAGNOSIS — R10.30 LOWER ABDOMINAL PAIN: ICD-10-CM

## 2024-12-10 DIAGNOSIS — R63.0 LOSS OF APPETITE: ICD-10-CM

## 2024-12-10 DIAGNOSIS — E87.1 HYPONATREMIA: ICD-10-CM

## 2024-12-10 PROCEDURE — 6360000004 HC RX CONTRAST MEDICATION: Performed by: INTERNAL MEDICINE

## 2024-12-10 PROCEDURE — 2500000003 HC RX 250 WO HCPCS: Performed by: INTERNAL MEDICINE

## 2024-12-10 PROCEDURE — 74177 CT ABD & PELVIS W/CONTRAST: CPT

## 2024-12-10 RX ORDER — IOPAMIDOL 755 MG/ML
100 INJECTION, SOLUTION INTRAVASCULAR
Status: COMPLETED | OUTPATIENT
Start: 2024-12-10 | End: 2024-12-10

## 2024-12-10 RX ADMIN — IOPAMIDOL 100 ML: 755 INJECTION, SOLUTION INTRAVENOUS at 09:38

## 2024-12-10 RX ADMIN — BARIUM SULFATE 900 ML: 20 SUSPENSION ORAL at 09:39

## 2024-12-11 LAB
ALBUMIN SERPL-MCNC: 4 G/DL (ref 3.5–5)
ALBUMIN/GLOB SERPL: 1.3 (ref 1.1–2.2)
ALP SERPL-CCNC: 73 U/L (ref 45–117)
ALT SERPL-CCNC: 22 U/L (ref 12–78)
ANION GAP SERPL CALC-SCNC: 6 MMOL/L (ref 2–12)
AST SERPL-CCNC: 18 U/L (ref 15–37)
BILIRUB SERPL-MCNC: 0.7 MG/DL (ref 0.2–1)
BUN SERPL-MCNC: 19 MG/DL (ref 6–20)
BUN/CREAT SERPL: 17 (ref 12–20)
CALCIUM SERPL-MCNC: 9.5 MG/DL (ref 8.5–10.1)
CHLORIDE SERPL-SCNC: 103 MMOL/L (ref 97–108)
CO2 SERPL-SCNC: 27 MMOL/L (ref 21–32)
CREAT SERPL-MCNC: 1.09 MG/DL (ref 0.7–1.3)
GLOBULIN SER CALC-MCNC: 3.1 G/DL (ref 2–4)
GLUCOSE SERPL-MCNC: 94 MG/DL (ref 65–100)
POTASSIUM SERPL-SCNC: 4.3 MMOL/L (ref 3.5–5.1)
PROT SERPL-MCNC: 7.1 G/DL (ref 6.4–8.2)
SODIUM SERPL-SCNC: 136 MMOL/L (ref 136–145)

## 2024-12-12 DIAGNOSIS — K59.09 OTHER CONSTIPATION: Primary | ICD-10-CM

## 2024-12-12 RX ORDER — SENNA AND DOCUSATE SODIUM 50; 8.6 MG/1; MG/1
2 TABLET, FILM COATED ORAL DAILY
Qty: 60 TABLET | Refills: 2 | Status: SHIPPED | OUTPATIENT
Start: 2024-12-12 | End: 2025-03-12

## 2024-12-26 DIAGNOSIS — E78.5 HYPERLIPIDEMIA, UNSPECIFIED HYPERLIPIDEMIA TYPE: ICD-10-CM

## 2024-12-26 DIAGNOSIS — I10 PRIMARY HYPERTENSION: ICD-10-CM

## 2024-12-26 DIAGNOSIS — R51.9 FREQUENT HEADACHES: ICD-10-CM

## 2024-12-26 DIAGNOSIS — G47.30 SLEEP DISORDER BREATHING: ICD-10-CM

## 2024-12-26 RX ORDER — AMITRIPTYLINE HYDROCHLORIDE 10 MG/1
10 TABLET ORAL NIGHTLY
Qty: 90 TABLET | Refills: 1 | Status: SHIPPED | OUTPATIENT
Start: 2024-12-26

## 2024-12-26 RX ORDER — PRAVASTATIN SODIUM 20 MG
20 TABLET ORAL NIGHTLY
Qty: 90 TABLET | Refills: 0 | Status: SHIPPED | OUTPATIENT
Start: 2024-12-26

## 2024-12-26 RX ORDER — LOSARTAN POTASSIUM 25 MG/1
25 TABLET ORAL DAILY
Qty: 90 TABLET | Refills: 0 | Status: SHIPPED | OUTPATIENT
Start: 2024-12-26

## 2024-12-30 ENCOUNTER — TELEPHONE (OUTPATIENT)
Dept: PRIMARY CARE CLINIC | Facility: CLINIC | Age: 88
End: 2024-12-30

## 2024-12-30 ENCOUNTER — TELEPHONE (OUTPATIENT)
Age: 88
End: 2024-12-30

## 2024-12-30 NOTE — TELEPHONE ENCOUNTER
Incoming call from patient's son, Barry. Verified on PHI. Patient ID x 2 verified. Patient has not been seen by any of our providers since 2014. He has appointment with Dr. Moss on 1/20. Patient's son states that his Father's heart rate is 29, and that he has been having serious trouble breathing and cannot sleep at night. The patient's son asked if we had any sooner appointments available, but I advised him to take his father to the ER today. I advised him we will keep his 1/20 appointment and I will notify him if anything opens up before then, although I told him we likely won't have anything before then for any providers. He verbalized understanding and was appreciative.

## 2024-12-30 NOTE — TELEPHONE ENCOUNTER
Patients son says his fathers heart rate is very low and he is concerned about him.  He was asking for an appt to see Dr De La Vega today.

## 2024-12-30 NOTE — TELEPHONE ENCOUNTER
Called and spoke with the son, he is on PHI. The son reports that his pulse is in the low 30's. Reports that the patient is not sleeping well at night because he feels SOB at night time    Patient has an appointment for 1/20/25 with Cardio to establish care with them    Went to urgent care last week for shoulder pain and they told him about his heart then. The son is concerned over this and is seeking an appointment

## 2024-12-30 NOTE — TELEPHONE ENCOUNTER
I called and spoke with the patients son, I have advised per the PCP for the patient to please go to the ER, he said no problem he will take him right now.

## 2024-12-30 NOTE — TELEPHONE ENCOUNTER
Patient's son is calling because his father is not feeling well patient's heart rate is at 29. Patient's son said, he is having problems breathing and is unable to seep well.Patient is a new patient to the provider.    [phone]

## 2025-01-20 ENCOUNTER — OFFICE VISIT (OUTPATIENT)
Age: 89
End: 2025-01-20
Payer: MEDICARE

## 2025-01-20 VITALS
SYSTOLIC BLOOD PRESSURE: 116 MMHG | WEIGHT: 155 LBS | HEART RATE: 65 BPM | HEIGHT: 69 IN | DIASTOLIC BLOOD PRESSURE: 62 MMHG | OXYGEN SATURATION: 99 % | BODY MASS INDEX: 22.96 KG/M2

## 2025-01-20 DIAGNOSIS — I49.3 PVC (PREMATURE VENTRICULAR CONTRACTION): ICD-10-CM

## 2025-01-20 DIAGNOSIS — R00.1 BRADYCARDIA: Primary | ICD-10-CM

## 2025-01-20 PROCEDURE — 1123F ACP DISCUSS/DSCN MKR DOCD: CPT | Performed by: INTERNAL MEDICINE

## 2025-01-20 PROCEDURE — 99204 OFFICE O/P NEW MOD 45 MIN: CPT | Performed by: INTERNAL MEDICINE

## 2025-01-20 PROCEDURE — 1126F AMNT PAIN NOTED NONE PRSNT: CPT | Performed by: INTERNAL MEDICINE

## 2025-01-20 PROCEDURE — 1159F MED LIST DOCD IN RCRD: CPT | Performed by: INTERNAL MEDICINE

## 2025-01-20 PROCEDURE — 93000 ELECTROCARDIOGRAM COMPLETE: CPT | Performed by: INTERNAL MEDICINE

## 2025-01-20 ASSESSMENT — PATIENT HEALTH QUESTIONNAIRE - PHQ9
2. FEELING DOWN, DEPRESSED OR HOPELESS: NOT AT ALL
SUM OF ALL RESPONSES TO PHQ9 QUESTIONS 1 & 2: 0
SUM OF ALL RESPONSES TO PHQ QUESTIONS 1-9: 0
1. LITTLE INTEREST OR PLEASURE IN DOING THINGS: NOT AT ALL
SUM OF ALL RESPONSES TO PHQ QUESTIONS 1-9: 0

## 2025-01-20 NOTE — PROGRESS NOTES
1. Have you been to the ER, urgent care clinic since your last visit?  Hospitalized since your last visit?No    2. Have you seen or consulted any other health care providers outside of the [facility] since your last visit?  Include any pap smears or colon screening. No    
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       LABORATORY DATA        Lab Results   Component Value Date/Time    WBC 8.1 12/03/2024 02:35 PM    HGB 14.3 12/03/2024 02:35 PM    HCT 43.5 12/03/2024 02:35 PM     12/03/2024 02:35 PM    MCV 91.4 12/03/2024 02:35 PM        Lab Results   Component Value Date/Time     12/10/2024 08:15 AM    K 4.3 12/10/2024 08:15 AM     12/10/2024 08:15 AM    CO2 27 12/10/2024 08:15 AM    BUN 19 12/10/2024 08:15 AM    GFRAA >60 01/21/2022 10:00 AM    GLOB 3.1 12/10/2024 08:15 AM    ALT 22 12/10/2024 08:15 AM        Lab Results   Component Value Date    CHOL 172 12/03/2024    TRIG 116 12/03/2024    HDL 59 12/03/2024    VLDL 23.2 12/03/2024    CHOLHDLRATIO 2.9 12/03/2024        Thank you,  Mandy De La Vega MD for involving me in the care of  Keanu Veras. Please do not hesitate to contact me for further questions/concerns.       Patient Care Team:  Mandy De La Vega MD as PCP - General  Mandy De La Vega MD as PCP - Empaneled Provider    [facility] [place]  [facility] [address], [address]  [place], [place] [address]  Fax : [phone]     [facility] -- Cardiology, [place]  [address]  [address]  [place] [address]  Fax: [phone]    [facility]  Call center: (P) [phone]  (F) [phone]    The patient (or guardian, if applicable) and other individuals in attendance with the patient were advised that Artificial Intelligence will be utilized during this visit to record and process the conversation to generate a clinical note. The patient (or guardian, if applicable) and other individuals in attendance at the appointment consented to the use of AI, including the recording.       Voice - recognition dictation software was used in  the generation of this note.  Errors may exist. if there is any potential confusion or discrepancy, please feel free to

## 2025-01-21 ENCOUNTER — ANCILLARY PROCEDURE (OUTPATIENT)
Age: 89
End: 2025-01-21
Payer: MEDICARE

## 2025-01-21 DIAGNOSIS — R00.1 BRADYCARDIA: ICD-10-CM

## 2025-01-21 DIAGNOSIS — I49.3 PVC (PREMATURE VENTRICULAR CONTRACTION): ICD-10-CM

## 2025-01-21 PROCEDURE — 93225 XTRNL ECG REC<48 HRS REC: CPT | Performed by: INTERNAL MEDICINE

## 2025-01-23 ENCOUNTER — PATIENT MESSAGE (OUTPATIENT)
Age: 89
End: 2025-01-23

## 2025-01-23 NOTE — RESULT ENCOUNTER NOTE
Holter monitor shows PVC burden of 43% significantly high which could be the reason for his fatigue tiredness  Start him on metoprolol XL 25 mg once daily

## 2025-01-23 NOTE — RESULT ENCOUNTER NOTE
Feliz Pearl  Can you review this patient's Holter monitor have significant PVC burden of 43%  Complaining of fatigue tiredness  Is a 89-year-old juan a  I put him on metoprolol XL  Any thoughts?  Antiarrhythmic versus ablation [age is a barrier] thanks John

## 2025-01-24 ENCOUNTER — TELEPHONE (OUTPATIENT)
Age: 89
End: 2025-01-24

## 2025-01-24 DIAGNOSIS — I49.3 PVC (PREMATURE VENTRICULAR CONTRACTION): Primary | ICD-10-CM

## 2025-01-24 RX ORDER — AMIODARONE HYDROCHLORIDE 200 MG/1
200 TABLET ORAL DAILY
Qty: 90 TABLET | Refills: 1 | Status: SHIPPED | OUTPATIENT
Start: 2025-01-24

## 2025-01-24 RX ORDER — METOPROLOL SUCCINATE 25 MG/1
25 TABLET, EXTENDED RELEASE ORAL DAILY
Qty: 90 TABLET | Refills: 1 | Status: SHIPPED | OUTPATIENT
Start: 2025-01-24 | End: 2025-01-24

## 2025-01-24 NOTE — PROGRESS NOTES
Per VO of MD    LOV: Visit date not found     Future Appointments   Date Time Provider Department Center   4/28/2025 10:40 AM John Moss MD [facility] AMB       Requested Prescriptions     Signed Prescriptions Disp Refills    metoprolol succinate (TOPROL XL) 25 MG extended release tablet 90 tablet 1     Sig: Take 1 tablet by mouth daily

## 2025-01-24 NOTE — TELEPHONE ENCOUNTER
----- Message from Dr. Denisa Pearl MD sent at 1/24/2025  8:29 AM EST -----  PVCs location near the basal crux. Between ablation vs. Medication with him given his age I would focus on medicine. I would stop Metoprolol and start him on Amiodarone 200 mg daily (no load to make sure he tolerates it). Thanks.  ----- Message -----  From: John Moss MD  Sent: 1/23/2025   4:41 PM EST  To: Denisa Pearl MD    Hello Dr Pearl  Can you review this patient's Holter monitor have significant PVC burden of 43%  Complaining of fatigue tiredness  Is a 89-year-old juan a  I put him on metoprolol XL  Any thoughts?  Antiarrhythmic versus ablation [age is a barrier] thanks John

## 2025-02-03 DIAGNOSIS — R10.13 DYSPEPSIA: ICD-10-CM

## 2025-02-03 DIAGNOSIS — E78.5 HYPERLIPIDEMIA, UNSPECIFIED HYPERLIPIDEMIA TYPE: ICD-10-CM

## 2025-02-03 RX ORDER — PRAVASTATIN SODIUM 20 MG
20 TABLET ORAL NIGHTLY
Qty: 90 TABLET | Refills: 0 | Status: SHIPPED | OUTPATIENT
Start: 2025-02-03

## 2025-02-03 RX ORDER — FAMOTIDINE 40 MG/1
40 TABLET, FILM COATED ORAL EVERY EVENING
Qty: 90 TABLET | Refills: 0 | Status: SHIPPED | OUTPATIENT
Start: 2025-02-03

## 2025-03-14 ENCOUNTER — TELEPHONE (OUTPATIENT)
Age: 89
End: 2025-03-14

## 2025-03-14 DIAGNOSIS — I49.3 PVC (PREMATURE VENTRICULAR CONTRACTION): Primary | ICD-10-CM

## 2025-03-14 DIAGNOSIS — I25.10 ATHEROSCLEROSIS OF NATIVE CORONARY ARTERY OF NATIVE HEART WITHOUT ANGINA PECTORIS: ICD-10-CM

## 2025-03-14 DIAGNOSIS — I10 ESSENTIAL HYPERTENSION: ICD-10-CM

## 2025-03-14 NOTE — TELEPHONE ENCOUNTER
Patient requested a call back due to his fall after taking medication that Dr. Moss proscribed      Contact Information  [phone] (Mobile)   [phone] (Home Phone)

## 2025-03-23 ENCOUNTER — RESULTS FOLLOW-UP (OUTPATIENT)
Age: 89
End: 2025-03-23

## 2025-03-23 NOTE — RESULT ENCOUNTER NOTE
Echo shows normal heart function with EF of 55 to 60% with grade 2 diastolic dysfunction-congestive heart failure with mildly dilated right ventricle  At his age I would continue conservative medical management and continue losartan and take as needed Lasix if worsening shortness of breath  As per frequent PVCs looks like patient had significant side effect to amiodarone will continue to monitor for now

## 2025-04-03 ENCOUNTER — TELEPHONE (OUTPATIENT)
Dept: PRIMARY CARE CLINIC | Facility: CLINIC | Age: 89
End: 2025-04-03

## 2025-04-03 NOTE — TELEPHONE ENCOUNTER
Called and spoke with the son. Reports that this recently started yesterday  Right foot reports that it is turning purple.  Reports that if the patient elevates his foot the color returns     I had to call the patient for further information-  He reports that the right foot is is very hot and red- pain present. He does not notice any spots or blisters. Reports that the whole foot is red. Does not notice any swelling-reports that the foot size is still normal when I asked.

## 2025-04-03 NOTE — TELEPHONE ENCOUNTER
Called and spoke with the son, notified that the patient should go to the ER for further evaluation and possible imaging per our NP

## 2025-04-03 NOTE — TELEPHONE ENCOUNTER
Patients son is calling, he says his fathers foot is turning purple and he is worried he has a circulation problem.

## 2025-04-14 ENCOUNTER — TELEPHONE (OUTPATIENT)
Dept: PRIMARY CARE CLINIC | Facility: CLINIC | Age: 89
End: 2025-04-14

## 2025-04-14 NOTE — TELEPHONE ENCOUNTER
Called and spoke with Barry- the patients son. Patient is still having leg pain and now whole body pain. Patient never went to ER as advised by PCP.  Asking for an appointment, told him I will need to see where Dr De La Vega can work him in at

## 2025-04-14 NOTE — TELEPHONE ENCOUNTER
Patient's son called to make an appt for his father.  An appt was scheduled for Friday, 4/18 but wanted to see if he can get in any sooner with Dr De La Vega in regards to the patient feeling very tired and pain in his whole body.

## 2025-04-15 ENCOUNTER — OFFICE VISIT (OUTPATIENT)
Dept: PRIMARY CARE CLINIC | Facility: CLINIC | Age: 89
End: 2025-04-15
Payer: MEDICARE

## 2025-04-15 VITALS
SYSTOLIC BLOOD PRESSURE: 136 MMHG | HEART RATE: 69 BPM | HEIGHT: 69 IN | OXYGEN SATURATION: 98 % | DIASTOLIC BLOOD PRESSURE: 63 MMHG | TEMPERATURE: 97.2 F | WEIGHT: 149.8 LBS | RESPIRATION RATE: 16 BRPM | BODY MASS INDEX: 22.19 KG/M2

## 2025-04-15 DIAGNOSIS — Z29.11 NEED FOR RSV VACCINATION: ICD-10-CM

## 2025-04-15 DIAGNOSIS — M79.10 MYALGIA: ICD-10-CM

## 2025-04-15 DIAGNOSIS — H61.22 IMPACTED CERUMEN OF LEFT EAR: ICD-10-CM

## 2025-04-15 DIAGNOSIS — E11.9 WELL CONTROLLED DIABETES MELLITUS (HCC): ICD-10-CM

## 2025-04-15 DIAGNOSIS — Z00.00 MEDICARE ANNUAL WELLNESS VISIT, SUBSEQUENT: Primary | ICD-10-CM

## 2025-04-15 DIAGNOSIS — G89.29 CHRONIC RIGHT SHOULDER PAIN: ICD-10-CM

## 2025-04-15 DIAGNOSIS — R53.83 OTHER FATIGUE: ICD-10-CM

## 2025-04-15 DIAGNOSIS — M25.511 CHRONIC RIGHT SHOULDER PAIN: ICD-10-CM

## 2025-04-15 DIAGNOSIS — Z23 NEED FOR TETANUS BOOSTER: ICD-10-CM

## 2025-04-15 LAB
CK SERPL-CCNC: 197 U/L (ref 39–308)
EST. AVERAGE GLUCOSE BLD GHB EST-MCNC: 108 MG/DL
HBA1C MFR BLD: 5.4 % (ref 4–5.6)
TSH SERPL DL<=0.05 MIU/L-ACNC: 2.86 UIU/ML (ref 0.36–3.74)

## 2025-04-15 PROCEDURE — 69210 REMOVE IMPACTED EAR WAX UNI: CPT | Performed by: INTERNAL MEDICINE

## 2025-04-15 PROCEDURE — 90471 IMMUNIZATION ADMIN: CPT | Performed by: INTERNAL MEDICINE

## 2025-04-15 PROCEDURE — 1125F AMNT PAIN NOTED PAIN PRSNT: CPT | Performed by: INTERNAL MEDICINE

## 2025-04-15 PROCEDURE — 1160F RVW MEDS BY RX/DR IN RCRD: CPT | Performed by: INTERNAL MEDICINE

## 2025-04-15 PROCEDURE — 1159F MED LIST DOCD IN RCRD: CPT | Performed by: INTERNAL MEDICINE

## 2025-04-15 PROCEDURE — G0439 PPPS, SUBSEQ VISIT: HCPCS | Performed by: INTERNAL MEDICINE

## 2025-04-15 PROCEDURE — 3044F HG A1C LEVEL LT 7.0%: CPT | Performed by: INTERNAL MEDICINE

## 2025-04-15 PROCEDURE — 1123F ACP DISCUSS/DSCN MKR DOCD: CPT | Performed by: INTERNAL MEDICINE

## 2025-04-15 PROCEDURE — 90678 RSV VACC PREF BIVALENT IM: CPT | Performed by: INTERNAL MEDICINE

## 2025-04-15 PROCEDURE — 99214 OFFICE O/P EST MOD 30 MIN: CPT | Performed by: INTERNAL MEDICINE

## 2025-04-15 SDOH — ECONOMIC STABILITY: FOOD INSECURITY: WITHIN THE PAST 12 MONTHS, YOU WORRIED THAT YOUR FOOD WOULD RUN OUT BEFORE YOU GOT MONEY TO BUY MORE.: SOMETIMES TRUE

## 2025-04-15 SDOH — HEALTH STABILITY: PHYSICAL HEALTH: ON AVERAGE, HOW MANY DAYS PER WEEK DO YOU ENGAGE IN MODERATE TO STRENUOUS EXERCISE (LIKE A BRISK WALK)?: 0 DAYS

## 2025-04-15 SDOH — ECONOMIC STABILITY: FOOD INSECURITY: WITHIN THE PAST 12 MONTHS, THE FOOD YOU BOUGHT JUST DIDN'T LAST AND YOU DIDN'T HAVE MONEY TO GET MORE.: SOMETIMES TRUE

## 2025-04-15 SDOH — ECONOMIC STABILITY: INCOME INSECURITY: IN THE LAST 12 MONTHS, WAS THERE A TIME WHEN YOU WERE NOT ABLE TO PAY THE MORTGAGE OR RENT ON TIME?: NO

## 2025-04-15 SDOH — ECONOMIC STABILITY: TRANSPORTATION INSECURITY
IN THE PAST 12 MONTHS, HAS LACK OF TRANSPORTATION KEPT YOU FROM MEETINGS, WORK, OR FROM GETTING THINGS NEEDED FOR DAILY LIVING?: NO

## 2025-04-15 SDOH — ECONOMIC STABILITY: TRANSPORTATION INSECURITY
IN THE PAST 12 MONTHS, HAS THE LACK OF TRANSPORTATION KEPT YOU FROM MEDICAL APPOINTMENTS OR FROM GETTING MEDICATIONS?: NO

## 2025-04-15 ASSESSMENT — PATIENT HEALTH QUESTIONNAIRE - PHQ9
SUM OF ALL RESPONSES TO PHQ QUESTIONS 1-9: 2
1. LITTLE INTEREST OR PLEASURE IN DOING THINGS: SEVERAL DAYS
2. FEELING DOWN, DEPRESSED OR HOPELESS: SEVERAL DAYS

## 2025-04-15 ASSESSMENT — LIFESTYLE VARIABLES
HOW MANY STANDARD DRINKS CONTAINING ALCOHOL DO YOU HAVE ON A TYPICAL DAY: 0
HOW OFTEN DO YOU HAVE A DRINK CONTAINING ALCOHOL: NEVER
HOW MANY STANDARD DRINKS CONTAINING ALCOHOL DO YOU HAVE ON A TYPICAL DAY: PATIENT DOES NOT DRINK
HOW OFTEN DO YOU HAVE SIX OR MORE DRINKS ON ONE OCCASION: 1
HOW OFTEN DO YOU HAVE A DRINK CONTAINING ALCOHOL: 1

## 2025-04-15 ASSESSMENT — ENCOUNTER SYMPTOMS
ABDOMINAL PAIN: 0
COUGH: 0
EYE DISCHARGE: 0
BACK PAIN: 1
DIARRHEA: 0
CHEST TIGHTNESS: 0
SORE THROAT: 0
COLOR CHANGE: 0
CONSTIPATION: 0
SHORTNESS OF BREATH: 0
RHINORRHEA: 0

## 2025-04-15 NOTE — PROGRESS NOTES
Medicare Annual Wellness Visit    Keanu Veras is here for Medicare AWV and Leg Pain (Both legs hurt one leg is red, did not go to the ER for leg as directed )    Assessment & Plan   Medicare annual wellness visit, subsequent  .Age appropriate Health screening and immunization discussed with patient.    Need for RSV vaccination  -     RSV, ABRYSVO, (age 60y+ or Preg 32-36w Gest), PF, IM, 0.5mL       Subjective       Patient's complete Health Risk Assessment and screening values have been reviewed and are found in Flowsheets. The following problems were reviewed today and where indicated follow up appointments were made and/or referrals ordered.    Positive Risk Factor Screenings with Interventions:    Fall Risk:  Do you feel unsteady or are you worried about falling? : (!) (Patient-Rptd) yes  2 or more falls in past year?: (Patient-Rptd) no  Fall with injury in past year?: (Patient-Rptd) no     Interventions:    Reviewed medications, home hazards, visual acuity, and co-morbidities that can increase risk for falls  Patient declines any further evaluation or treatment         Controlled Medication Review:    Today's Pain Level: Pain Score: Five     Opioid Risk: (Low risk score <55) Opioid risk score: 16    Patient is low risk for opioid use disorder or overdose.    Last PDMP Harish as Reviewed:  Review User Review Instant Review Result                  General HRA Questions:  Select all that apply: (!) (Patient-Rptd) New or Increased Pain, New or Increased Fatigue    Interventions - Pain:  Patient comments: sees pain management   Interventions Fatigue:  Checking labs       Inactivity:  On average, how many days per week do you engage in moderate to strenuous exercise (like a brisk walk)?: (Patient-Rptd) 0 days (!) Abnormal       Interventions:  Patient comments: tries to stay active inside the house        Hearing Screen:  Do you or your family notice any trouble with your hearing that hasn't been managed with

## 2025-04-15 NOTE — PROGRESS NOTES
Health Decision Maker has been checked with the patient   Primary Decision Maker: Barry Oreilly - Child - [phone]     Patient has stated that the scribe can come in room    Chief Complaint   Patient presents with    Medicare AWV    Leg Pain     Both legs hurt one leg is red, did not go to the ER for leg as directed        \"Have you been to the ER, urgent care clinic since your last visit?  Hospitalized since your last visit?\"    NO    “Have you seen or consulted any other health care providers outside of [facility] since your last visit?”    NO  Pain management    Vitals:    04/15/25 1047   BP: 136/63   BP Site: Left Upper Arm   Patient Position: Sitting   BP Cuff Size: Small Adult   Pulse: 69   Resp: 16   Temp: 97.2 °F (36.2 °C)   TempSrc: Temporal   SpO2: 98%   Weight: 67.9 kg (149 lb 12.8 oz)   Height: 1.753 m (5' 9\")      Depression: Not at risk (4/15/2025)    PHQ-2     PHQ-2 Score: 2            Click Here for Release of Records Request    Chart reviewed: immunizations are documented.   Immunization History   Administered Date(s) Administered    COVID-19, MODERNA BLUE border, Primary or Immunocompromised, (age 12y+), IM, 100 mcg/0.5mL 02/02/2021, 03/02/2021, 10/27/2021    COVID-19, MODERNA Booster BLUE border, (age 18y+), IM, 50mcg/0.25mL 07/29/2022    Influenza, FLUAD, (age 65 y+), IM, Quadv, 0.5mL 09/15/2020, 12/15/2021, 10/26/2022, 02/16/2024    Influenza, FLUAD, (age 65 y+), IM, Trivalent PF, 0.5mL 12/03/2024    Pneumococcal, PCV20, PREVNAR 20, (age 6w+), IM, 0.5mL 07/29/2022    Pneumococcal, PPSV23, PNEUMOVAX 23, (age 2y+), SC/IM, 0.5mL 08/09/2019    TDaP, ADACEL (age 10y-64y), BOOSTRIX (age 10y+), IM, 0.5mL 06/17/2011        Patient provided with most updated VIS prior to administration. Opportunity given for questions and concerns provided. No concerns at this time    Immunizations administered to patient 4/15/2025 by Lisa Lorenzana LPN with consent.Patient tolerated procedure

## 2025-04-15 NOTE — PATIENT INSTRUCTIONS
[facility] Utility - Financial Resources*  (Call United Way/211 if need more resources.)  Utilities  Paymetric  What they offer: Partnership with the [facility] of . Assist with finding and applying for government funded programs and benefits. You can also update your benefits or report changes through Paymetric.  Website: https://www.[employer]/  Phone Number: 8335CALLVA (803-999-1129)    menuvoxShJellyfishArt.com  What they offer: EnergyShare is [facility]'s energy assistance program of last resort for anyone who faces financial hardships from unemployment or family crisis.  Phone Number: 142.326.5197  Address: [address] [place] [address]  Website: https://www.[employer]/[place]/billing/billing-options/energyshare    Energy Assistance Programs (EAP) - [facility] of   What they offer: EAP assists low-income households in meeting their immediate home energy needs.      Website: https://www.[employer].[place].gov/benefit/ea/  Available assistance:   Crisis Assistance - Heating Emergencies  Fuel Assistance - Offset Heating Fuel Costs  Cooling Assistance - Applies to Cooling Utility Bills and Equipment  How to apply:  Online:  https://CertusNetvir[place]Epy.io/  Call:  395.685.9410   Paper application:   Print application from https://www.[employer].[place].gov/benefit/ea/ and submit to your [facility] Department of     [facility] Department of   [facility] of  contacts: https://www.[facility].[place].[facility]/localagency/index.cgi  [place], [place] Utility Affordability Programs  https://IN[employer]ID[employer] Technologiesa.gov/public-utilities/billing  Call:  600.755.9074   Email:  samantha@a.gov  Programs available:  Equal Monthly Payment Plan, [employer] Heat Program, [employer] Water Program, [employer] Water Conservation Program, Senior Assistance, Other Fuel Assistance Programs, PromisePay Payment Plans, Low-Income Household Water

## 2025-04-15 NOTE — PROGRESS NOTES
Keanu Veras (:  1934) is a 89 y.o. male, Established patient, here for evaluation of the following chief complaint(s):  Medicare AWV and Leg Pain (Both legs hurt one leg is red, did not go to the ER for leg as directed )    ASSESSMENT/PLAN:      2. Chronic right shoulder pain  -     [facility] - Physical Therapy at [facility]  I referred patient to PT for his chronic shoulder pain.     3. Myalgia  -     TSH; Future  I ordered labs to measure TSH levels. Waiting for results.    4. Well controlled diabetes mellitus (HCC)  -     Hemoglobin A1C; Future  I ordered blood work to measure Hgb A1C levels. Waiting for results.    5. Other fatigue  -     TSH; Future  -     CK; Future  I ordered labs to measure TSH and CK levels. Waiting for results.    6. Need for tetanus booster  -     Tetanus-Diphth-Acell Pertussis (BOOSTRIX) 5-2.5-18.5 LF-MCG/0.5 injection; Inject 0.5 mLs into the muscle once for 1 dose, Disp-0.5 mL, R-0Normal. sent to pharmacy.  I sent Tdap vaccine to the pharmacy.     7. Impacted cerumen of left ear  -     REMOVE IMPACTED EAR WAX  Informed consent obtained.   3% hydrogen peroxide & water mixture used to make it soft.   Ear Curette used to take the wax out from both ears. She tolerated procedure well.              Subjective   SUBJECTIVE/OBJECTIVE:  Leg Pain       Patient presents today for a medicare wellness visit and follow-up on chronic conditions. He is joined by his son who provides additional medical information.     Patient has a purple and swollen right foot. Patient says it is painful, especially at nighttime. He also endorses pain in other parts of his body such as his right shoulder. He was directed to go to the ER but did not go. He has not gone for PT.     He has chronic back pain. He takes meloxicam 15 mg PRN daily. He takes gabapentin 300 mg and hydrocodone nightly by pain management.     Patient has PVC. He recently had a ECG. He was taking metoprolol but had negative

## 2025-04-17 ENCOUNTER — HOSPITAL ENCOUNTER (OUTPATIENT)
Facility: HOSPITAL | Age: 89
Setting detail: RECURRING SERIES
Discharge: HOME | End: 2025-04-20
Attending: INTERNAL MEDICINE
Payer: MEDICARE

## 2025-04-17 ENCOUNTER — RESULTS FOLLOW-UP (OUTPATIENT)
Dept: PRIMARY CARE CLINIC | Facility: CLINIC | Age: 89
End: 2025-04-17

## 2025-04-17 ENCOUNTER — TELEPHONE (OUTPATIENT)
Dept: PRIMARY CARE CLINIC | Facility: CLINIC | Age: 89
End: 2025-04-17

## 2025-04-17 PROCEDURE — 97140 MANUAL THERAPY 1/> REGIONS: CPT | Performed by: PHYSICAL THERAPIST

## 2025-04-17 PROCEDURE — 97110 THERAPEUTIC EXERCISES: CPT | Performed by: PHYSICAL THERAPIST

## 2025-04-17 PROCEDURE — 97161 PT EVAL LOW COMPLEX 20 MIN: CPT | Performed by: PHYSICAL THERAPIST

## 2025-04-17 NOTE — THERAPY EVALUATION
Physical Therapy at [facility],   a part of [facility] [place] [facility]  [address] [address]  Phone:[phone] Fax:[phone]                                                                            PHYSICAL THERAPY - MEDICARE EVALUATION/PLAN OF CARE NOTE (updated 3/23)      Date: 2025          Patient Name:  Keanu Veras :  1934   Medical   Diagnosis:  Chronic right shoulder pain [M25.511, G89.29] Treatment Diagnosis:  M25.511  RIGHT SHOULDER PAIN  and M54.2  NECK PAIN    Referral Source:  Mandy De La Vega MD Provider #:  [id number]                  Insurance: Payor: [employer] / Plan: [employer] [place] COMPLETE HMO / Product Type: *No Product type* /      Patient  verified yes     Visit #   Current  / Total 1    Time   In / Out 1200 P 1240 P   Total Treatment Time 40   Total Timed Codes 25   1:1 Treatment Time 25      MC BC Totals Reminder:  bill using total billable   min of TIMED therapeutic procedures and modalities.   8-22 min = 1 unit; 23-37 min = 2 units; 38-52 min = 3 units;  53-67 min = 4 units; 68-82 min = 5 units           SUBJECTIVE  Pain Level (0-10 scale): current 5-6 worst 9 best 2-3   [x]constant []intermittent []improving []worsening [x]no change since onset    Any medication changes, allergies to medications, adverse drug reactions, diagnosis change, or new procedure performed?: [x] No    [] Yes (see summary sheet for update)  Medications: Verified on Patient Summary List    Subjective functional status/changes:     Patient referred to PT with a chief complaint of R shoulder pain which began of insidious onset 2 weeks ago. He has history of joint pain in B knees and B feet. Complains of posterior shoulder pain that goes into R periscapular region. Pain is worse at night.     [place] leaving the country [place] 1 month for 1 month.     Currently walks 30 min 7 days per week.     Onset Date: 2 weeks ago   Start of Care:

## 2025-04-17 NOTE — TELEPHONE ENCOUNTER
Called and spoke with the patients son, advised that the patient should try to wear worm socks to help, should reach out if continues, but per PCP this is not a circulation issue

## 2025-04-17 NOTE — TELEPHONE ENCOUNTER
Called and spoke with the patients son who is on PHI, asking about the labs. Notified that these have not been reviewed yet. Wanting to know what he needs to be doing for the foot. Told him I will pass along a message to Dr De La Vega.

## 2025-04-17 NOTE — TELEPHONE ENCOUNTER
Patient son is calling because his dad right foot still swollen and needs to know what to do and what his blood results are. He said he is very concerned on his dad foot

## 2025-04-23 ENCOUNTER — APPOINTMENT (OUTPATIENT)
Facility: HOSPITAL | Age: 89
End: 2025-04-23
Attending: INTERNAL MEDICINE
Payer: MEDICARE

## 2025-04-28 ENCOUNTER — OFFICE VISIT (OUTPATIENT)
Age: 89
End: 2025-04-28
Payer: MEDICARE

## 2025-04-28 VITALS
HEIGHT: 69 IN | DIASTOLIC BLOOD PRESSURE: 58 MMHG | BODY MASS INDEX: 21.77 KG/M2 | HEART RATE: 80 BPM | OXYGEN SATURATION: 98 % | WEIGHT: 147 LBS | SYSTOLIC BLOOD PRESSURE: 102 MMHG

## 2025-04-28 DIAGNOSIS — I49.3 PVC (PREMATURE VENTRICULAR CONTRACTION): ICD-10-CM

## 2025-04-28 DIAGNOSIS — I73.9 PVD (PERIPHERAL VASCULAR DISEASE): Primary | ICD-10-CM

## 2025-04-28 DIAGNOSIS — R42 DIZZINESS: ICD-10-CM

## 2025-04-28 PROCEDURE — 99214 OFFICE O/P EST MOD 30 MIN: CPT | Performed by: INTERNAL MEDICINE

## 2025-04-28 PROCEDURE — 1159F MED LIST DOCD IN RCRD: CPT | Performed by: INTERNAL MEDICINE

## 2025-04-28 PROCEDURE — 1126F AMNT PAIN NOTED NONE PRSNT: CPT | Performed by: INTERNAL MEDICINE

## 2025-04-28 PROCEDURE — 1123F ACP DISCUSS/DSCN MKR DOCD: CPT | Performed by: INTERNAL MEDICINE

## 2025-04-28 ASSESSMENT — PATIENT HEALTH QUESTIONNAIRE - PHQ9
SUM OF ALL RESPONSES TO PHQ QUESTIONS 1-9: 0
1. LITTLE INTEREST OR PLEASURE IN DOING THINGS: NOT AT ALL
2. FEELING DOWN, DEPRESSED OR HOPELESS: NOT AT ALL
SUM OF ALL RESPONSES TO PHQ QUESTIONS 1-9: 0

## 2025-04-28 NOTE — PROGRESS NOTES
1. Have you been to the ER, urgent care clinic since your last visit?  Hospitalized since your last visit?  No    2. Have you seen or consulted any other health care providers outside of the [facility] since your last visit?  Include any pap smears or colon screening.   [facility]   Pain Management provider for spine   
Tricuspid Valve: Mild regurgitation.    Left Atrium: Left atrium is mildly dilated. LA Vol Index A/L is 40 mL/m2 mL/m2.    Right Atrium: Right atrium is moderately dilated.    Image quality is good.    Signed by: John Moss MD on 3/21/2025 10:25 PM       nuclear scan on 12/31/2024 which showed normal SPECT perfusion with EF of 64%        LABORATORY DATA        Lab Results   Component Value Date/Time    WBC 8.1 12/03/2024 02:35 PM    HGB 14.3 12/03/2024 02:35 PM    HCT 43.5 12/03/2024 02:35 PM     12/03/2024 02:35 PM    MCV 91.4 12/03/2024 02:35 PM        Lab Results   Component Value Date/Time     12/10/2024 08:15 AM    K 4.3 12/10/2024 08:15 AM     12/10/2024 08:15 AM    CO2 27 12/10/2024 08:15 AM    BUN 19 12/10/2024 08:15 AM    GFRAA >60 01/21/2022 10:00 AM    GLOB 3.1 12/10/2024 08:15 AM    ALT 22 12/10/2024 08:15 AM        Lab Results   Component Value Date    CHOL 172 12/03/2024    TRIG 116 12/03/2024    HDL 59 12/03/2024    VLDL 23.2 12/03/2024    CHOLHDLRATIO 2.9 12/03/2024        Thank you,  Mandy De La Vega MD for involving me in the care of  Keanu Veras. Please do not hesitate to contact me for further questions/concerns.       Patient Care Team:  Mandy De La Vega MD as PCP - General  Mandy De La Vega MD as PCP - Empaneled Provider    [facility] [place]  [facility] [place]  [address], [address]  [place], [place] [address]  Fax : [phone]     [facility] -- Cardiology, [place]  [address].  [address]  [place], [place] [address]  Fax: [phone]    [facility]  Call center: (P) [phone]  (F) [phone]    The patient (or guardian, if applicable) and other individuals in attendance with the patient were advised that Artificial Intelligence will be utilized during this visit to record and process the conversation to generate a clinical note. The patient (or guardian, if applicable) and other individuals in

## 2025-04-28 NOTE — PATIENT INSTRUCTIONS
Dear Keanu,    Thank you for visiting today. Your dedication to improving your health is greatly appreciated.    Following our consultation, here are the key instructions and recommendations for your care plan:    - Medications:    - Start taking magnesium glycinate, one tablet daily    - Continue taking potassium as prescribed    - Diagnostic Tests:    - Complete another 48-hour heart monitor    - Undergo a test to check for blockages in the legs (similar to a blood pressure test)    - Referrals:    - Keep the appointment with Dr. Pearl unless told otherwise    - Lifestyle Recommendations:    - Continue exercising, including walking at [employer]    - Consider wearing tight stockings to help with leg circulation    - Try using warm water on legs to help with pain    - Follow-up:    - Wait for results of the 48-hour heart monitor      - If PVCs are less than 10%, continue monitoring      - If PVCs are more than 10-15%, start flecainide 50 mg twice daily    Please reach out if you have any questions or concerns. Your health and well-being are our top priority.    Sincerely,    John Moss MD  Cardiology

## 2025-05-26 DIAGNOSIS — I10 PRIMARY HYPERTENSION: ICD-10-CM

## 2025-05-27 RX ORDER — LOSARTAN POTASSIUM 25 MG/1
25 TABLET ORAL DAILY
Qty: 90 TABLET | Refills: 0 | Status: SHIPPED | OUTPATIENT
Start: 2025-05-27

## 2025-05-28 DIAGNOSIS — E78.5 HYPERLIPIDEMIA, UNSPECIFIED HYPERLIPIDEMIA TYPE: ICD-10-CM

## 2025-05-28 RX ORDER — PRAVASTATIN SODIUM 20 MG
20 TABLET ORAL NIGHTLY
Qty: 90 TABLET | Refills: 0 | Status: SHIPPED | OUTPATIENT
Start: 2025-05-28

## 2025-06-06 ENCOUNTER — ANCILLARY PROCEDURE (OUTPATIENT)
Age: 89
End: 2025-06-06
Payer: MEDICARE

## 2025-06-06 DIAGNOSIS — I49.3 PVC (PREMATURE VENTRICULAR CONTRACTION): ICD-10-CM

## 2025-06-06 DIAGNOSIS — I73.9 PVD (PERIPHERAL VASCULAR DISEASE): ICD-10-CM

## 2025-06-06 PROCEDURE — 93225 XTRNL ECG REC<48 HRS REC: CPT | Performed by: INTERNAL MEDICINE

## 2025-06-09 ENCOUNTER — ANCILLARY PROCEDURE (OUTPATIENT)
Age: 89
End: 2025-06-09
Payer: MEDICARE

## 2025-06-09 ENCOUNTER — RESULTS FOLLOW-UP (OUTPATIENT)
Age: 89
End: 2025-06-09

## 2025-06-09 DIAGNOSIS — I73.9 PVD (PERIPHERAL VASCULAR DISEASE): ICD-10-CM

## 2025-06-09 LAB
VAS LEFT ABI: 1.3
VAS LEFT ARM BP: 115 MMHG
VAS LEFT DORSALIS PEDIS BP: 150 MMHG
VAS LEFT PTA BP: 145 MMHG
VAS LEFT TBI: 0.9
VAS LEFT TOE PRESSURE: 104 MMHG
VAS RIGHT ABI: 1.28
VAS RIGHT ARM BP: 114 MMHG
VAS RIGHT DORSALIS PEDIS BP: 147 MMHG
VAS RIGHT PTA BP: 143 MMHG
VAS RIGHT TBI: 1
VAS RIGHT TOE PRESSURE: 115 MMHG

## 2025-06-09 PROCEDURE — 93922 UPR/L XTREMITY ART 2 LEVELS: CPT | Performed by: INTERNAL MEDICINE

## 2025-06-10 PROCEDURE — 93227 XTRNL ECG REC<48 HR R&I: CPT | Performed by: INTERNAL MEDICINE

## 2025-06-10 NOTE — TELEPHONE ENCOUNTER
Telephone call made to patient.Patient son answered the phone per PHI. Two Identifiers used.  Explained to son that per Dr. Moss     6/9/25 Note     SKYLA shows no peripheral vascular disease good news continue same treatment      Vascular ankle brachial index (SKYLA)- Clinic Performed    Patient son verbalized understanding and has no further questions.  Future Appointments   Date Time Provider Department Center   6/17/2025 10:20 AM Denisa Pearl MD [facility]   9/5/2025  1:40 PM John Moss MD [facility]

## 2025-06-11 NOTE — RESULT ENCOUNTER NOTE
Holter monitor did not show any sustained cardiac arrhythmias rare PACs and PVCs with occasional sinus bradycardia nothing to worry I would continue same treatment

## 2025-06-11 NOTE — TELEPHONE ENCOUNTER
Attempted to reach patient by telephone. A message was left for return call.     Sent a [employer] message to patient with results.      calories and protein

## 2025-06-17 ENCOUNTER — OFFICE VISIT (OUTPATIENT)
Age: 89
End: 2025-06-17
Payer: MEDICARE

## 2025-06-17 VITALS
HEART RATE: 57 BPM | WEIGHT: 146.2 LBS | OXYGEN SATURATION: 97 % | SYSTOLIC BLOOD PRESSURE: 120 MMHG | BODY MASS INDEX: 21.66 KG/M2 | HEIGHT: 69 IN | DIASTOLIC BLOOD PRESSURE: 60 MMHG

## 2025-06-17 DIAGNOSIS — R00.1 BRADYCARDIA: ICD-10-CM

## 2025-06-17 DIAGNOSIS — I10 ESSENTIAL HYPERTENSION: ICD-10-CM

## 2025-06-17 DIAGNOSIS — I49.3 PVC (PREMATURE VENTRICULAR CONTRACTION): Primary | ICD-10-CM

## 2025-06-17 DIAGNOSIS — R42 DIZZINESS: ICD-10-CM

## 2025-06-17 DIAGNOSIS — E78.2 MIXED HYPERLIPIDEMIA: ICD-10-CM

## 2025-06-17 PROCEDURE — 1123F ACP DISCUSS/DSCN MKR DOCD: CPT | Performed by: INTERNAL MEDICINE

## 2025-06-17 PROCEDURE — 93010 ELECTROCARDIOGRAM REPORT: CPT | Performed by: INTERNAL MEDICINE

## 2025-06-17 PROCEDURE — 99204 OFFICE O/P NEW MOD 45 MIN: CPT | Performed by: INTERNAL MEDICINE

## 2025-06-17 PROCEDURE — 1126F AMNT PAIN NOTED NONE PRSNT: CPT | Performed by: INTERNAL MEDICINE

## 2025-06-17 PROCEDURE — 1160F RVW MEDS BY RX/DR IN RCRD: CPT | Performed by: INTERNAL MEDICINE

## 2025-06-17 PROCEDURE — 1159F MED LIST DOCD IN RCRD: CPT | Performed by: INTERNAL MEDICINE

## 2025-06-17 PROCEDURE — 93005 ELECTROCARDIOGRAM TRACING: CPT | Performed by: INTERNAL MEDICINE

## 2025-06-17 RX ORDER — HYALURONATE SODIUM 30 MG/2 ML
SYRINGE (ML) INTRAARTICULAR
COMMUNITY
Start: 2025-03-20

## 2025-06-17 ASSESSMENT — PATIENT HEALTH QUESTIONNAIRE - PHQ9
SUM OF ALL RESPONSES TO PHQ QUESTIONS 1-9: 0
1. LITTLE INTEREST OR PLEASURE IN DOING THINGS: NOT AT ALL
SUM OF ALL RESPONSES TO PHQ QUESTIONS 1-9: 0
SUM OF ALL RESPONSES TO PHQ QUESTIONS 1-9: 0
2. FEELING DOWN, DEPRESSED OR HOPELESS: NOT AT ALL
SUM OF ALL RESPONSES TO PHQ QUESTIONS 1-9: 0

## 2025-06-17 NOTE — PROGRESS NOTES
Cardiac Electrophysiology OFFICE Consultation Note       Assessment/Plan:   1. PVC (premature ventricular contraction)  -     EKG 12 Lead  2. Essential hypertension  -     EKG 12 Lead  3. Mixed hyperlipidemia  -     EKG 12 Lead  4. Dizziness  -     EKG 12 Lead  5. Bradycardia  -     EKG 12 Lead       Primary Cardiologist: Ajay      Frequent PVCs  Initial monitor demonstrated PVC burden of 43%  - Echocardiogram demonstrated preserved LVEF without any valvular disease  - Trial flecainide 50 mg twice daily resulted in unsteadiness and weakness and as result patient stopped it  - Interestingly repeat monitor on 6/6/2025 demonstrated PVC burden of less than 1%.  Overall he is feeling a lot better  - From the PVC perspective no evidence of PVCs on EKG today and based on recent heart monitor no further need for medical management for invasive procedure  - Reassurance was provided particularly with normal LVEF    Sinus bradycardia  Patient does report fatigue and generalized weakness.  No syncope or presyncope  - Monitor demonstrated no significant pauses with average heart rate of 56 bpm  - While he does have sinus bradycardia is hard to contribute his symptoms to heart rate alone  - Recommend further workup with primary care doctor regarding various possibilities for generalized weakness and fatigue  - If he did display any evidence of symptomatic bradycardia in the future, could consider pacemaker implantation, I do not see a role at this point in time  - Can follow-up with EP as needed        Subjective:       Keanu Veras is a 89 y.o. patient who is seen for evaluation of  PVCs    Monitor from 6/6/25 demonstrated PVCs burden of <1% (down from 43%). HE was started on Flecainide 50 mg BID but started having unsteadiness. EKG today demonstrated sinus bradycardia, rate 56 bpm, right bundle branch block with AV delay  ms,  ms.  No PVCs noted.  Patient reports that there was no change in his

## 2025-06-30 DIAGNOSIS — R73.03 PREDIABETES: ICD-10-CM

## 2025-07-12 DIAGNOSIS — K59.09 OTHER CONSTIPATION: ICD-10-CM

## 2025-07-12 RX ORDER — DOCUSATE SODIUM 50MG AND SENNOSIDES 8.6MG 8.6; 5 MG/1; MG/1
2 TABLET, FILM COATED ORAL DAILY
Qty: 60 TABLET | Refills: 2 | Status: SHIPPED | OUTPATIENT
Start: 2025-07-12

## 2025-08-22 ENCOUNTER — OFFICE VISIT (OUTPATIENT)
Dept: PRIMARY CARE CLINIC | Facility: CLINIC | Age: 89
End: 2025-08-22
Payer: MEDICARE

## 2025-08-22 VITALS
HEART RATE: 57 BPM | BODY MASS INDEX: 22.25 KG/M2 | RESPIRATION RATE: 16 BRPM | SYSTOLIC BLOOD PRESSURE: 138 MMHG | DIASTOLIC BLOOD PRESSURE: 76 MMHG | HEIGHT: 69 IN | WEIGHT: 150.2 LBS | OXYGEN SATURATION: 98 %

## 2025-08-22 DIAGNOSIS — M48.061 DEGENERATIVE LUMBAR SPINAL STENOSIS: ICD-10-CM

## 2025-08-22 DIAGNOSIS — I10 ESSENTIAL HYPERTENSION: ICD-10-CM

## 2025-08-22 DIAGNOSIS — K59.04 CHRONIC IDIOPATHIC CONSTIPATION: Primary | ICD-10-CM

## 2025-08-22 PROCEDURE — 1160F RVW MEDS BY RX/DR IN RCRD: CPT | Performed by: INTERNAL MEDICINE

## 2025-08-22 PROCEDURE — 99214 OFFICE O/P EST MOD 30 MIN: CPT | Performed by: INTERNAL MEDICINE

## 2025-08-22 PROCEDURE — 1123F ACP DISCUSS/DSCN MKR DOCD: CPT | Performed by: INTERNAL MEDICINE

## 2025-08-22 PROCEDURE — 1159F MED LIST DOCD IN RCRD: CPT | Performed by: INTERNAL MEDICINE

## 2025-08-22 RX ORDER — SODIUM PHOSPHATE, DIBASIC AND SODIUM PHOSPHATE, MONOBASIC 7; 19 G/230ML; G/230ML
1 ENEMA RECTAL
Qty: 3 ENEMA | COMMUNITY
Start: 2025-08-22

## 2025-08-22 ASSESSMENT — ENCOUNTER SYMPTOMS
EYE DISCHARGE: 0
CONSTIPATION: 1
RHINORRHEA: 0
DIARRHEA: 0
CHEST TIGHTNESS: 0
COUGH: 0
COLOR CHANGE: 0
SORE THROAT: 0
BACK PAIN: 1
SHORTNESS OF BREATH: 0
ABDOMINAL PAIN: 0

## 2025-08-24 DIAGNOSIS — I10 PRIMARY HYPERTENSION: ICD-10-CM

## 2025-08-24 RX ORDER — LOSARTAN POTASSIUM 25 MG/1
25 TABLET ORAL DAILY
Qty: 90 TABLET | Refills: 0 | Status: SHIPPED | OUTPATIENT
Start: 2025-08-24

## 2025-09-05 ENCOUNTER — OFFICE VISIT (OUTPATIENT)
Age: 89
End: 2025-09-05
Payer: MEDICARE

## 2025-09-05 VITALS
DIASTOLIC BLOOD PRESSURE: 72 MMHG | BODY MASS INDEX: 22.07 KG/M2 | WEIGHT: 149 LBS | OXYGEN SATURATION: 97 % | SYSTOLIC BLOOD PRESSURE: 136 MMHG | HEART RATE: 52 BPM | HEIGHT: 69 IN

## 2025-09-05 DIAGNOSIS — I10 ESSENTIAL HYPERTENSION: ICD-10-CM

## 2025-09-05 DIAGNOSIS — I49.3 PVC (PREMATURE VENTRICULAR CONTRACTION): Primary | ICD-10-CM

## 2025-09-05 PROCEDURE — 1126F AMNT PAIN NOTED NONE PRSNT: CPT | Performed by: INTERNAL MEDICINE

## 2025-09-05 PROCEDURE — 1159F MED LIST DOCD IN RCRD: CPT | Performed by: INTERNAL MEDICINE

## 2025-09-05 PROCEDURE — 1123F ACP DISCUSS/DSCN MKR DOCD: CPT | Performed by: INTERNAL MEDICINE

## 2025-09-05 PROCEDURE — G2211 COMPLEX E/M VISIT ADD ON: HCPCS | Performed by: INTERNAL MEDICINE

## 2025-09-05 PROCEDURE — 93000 ELECTROCARDIOGRAM COMPLETE: CPT | Performed by: INTERNAL MEDICINE

## 2025-09-05 PROCEDURE — 99214 OFFICE O/P EST MOD 30 MIN: CPT | Performed by: INTERNAL MEDICINE

## 2025-09-05 ASSESSMENT — PATIENT HEALTH QUESTIONNAIRE - PHQ9
1. LITTLE INTEREST OR PLEASURE IN DOING THINGS: NOT AT ALL
2. FEELING DOWN, DEPRESSED OR HOPELESS: NOT AT ALL
SUM OF ALL RESPONSES TO PHQ QUESTIONS 1-9: 0

## 2025-09-05 ASSESSMENT — LIFESTYLE VARIABLES: HOW MANY STANDARD DRINKS CONTAINING ALCOHOL DO YOU HAVE ON A TYPICAL DAY: PATIENT DOES NOT DRINK
